# Patient Record
Sex: FEMALE | Race: WHITE | Employment: FULL TIME | URBAN - METROPOLITAN AREA
[De-identification: names, ages, dates, MRNs, and addresses within clinical notes are randomized per-mention and may not be internally consistent; named-entity substitution may affect disease eponyms.]

---

## 2017-01-10 ENCOUNTER — ALLSCRIPTS OFFICE VISIT (OUTPATIENT)
Dept: OTHER | Facility: OTHER | Age: 52
End: 2017-01-10

## 2017-01-28 ENCOUNTER — LAB CONVERSION - ENCOUNTER (OUTPATIENT)
Dept: OTHER | Facility: OTHER | Age: 52
End: 2017-01-28

## 2017-01-28 LAB — TSH SERPL DL<=0.05 MIU/L-ACNC: 1.69 MIU/L

## 2017-02-07 ENCOUNTER — ALLSCRIPTS OFFICE VISIT (OUTPATIENT)
Dept: OTHER | Facility: OTHER | Age: 52
End: 2017-02-07

## 2017-03-17 ENCOUNTER — ANESTHESIA (OUTPATIENT)
Dept: GASTROENTEROLOGY | Facility: HOSPITAL | Age: 52
End: 2017-03-17
Payer: COMMERCIAL

## 2017-03-17 ENCOUNTER — ANESTHESIA EVENT (OUTPATIENT)
Dept: GASTROENTEROLOGY | Facility: HOSPITAL | Age: 52
End: 2017-03-17
Payer: COMMERCIAL

## 2017-03-17 ENCOUNTER — HOSPITAL ENCOUNTER (OUTPATIENT)
Facility: HOSPITAL | Age: 52
Setting detail: OUTPATIENT SURGERY
Discharge: HOME | End: 2017-03-17
Attending: COLON & RECTAL SURGERY | Admitting: COLON & RECTAL SURGERY
Payer: COMMERCIAL

## 2017-03-17 VITALS
SYSTOLIC BLOOD PRESSURE: 116 MMHG | WEIGHT: 203 LBS | RESPIRATION RATE: 16 BRPM | HEIGHT: 64 IN | OXYGEN SATURATION: 97 % | BODY MASS INDEX: 34.66 KG/M2 | HEART RATE: 90 BPM | DIASTOLIC BLOOD PRESSURE: 79 MMHG | TEMPERATURE: 98.7 F

## 2017-03-17 RX ORDER — ROSUVASTATIN CALCIUM 10 MG/1
10 TABLET, COATED ORAL DAILY
COMMUNITY
End: 2017-09-17

## 2017-03-17 RX ORDER — TRIAMCINOLONE ACETONIDE 55 UG/1
SPRAY, METERED NASAL
COMMUNITY
End: 2018-02-14

## 2017-03-17 RX ORDER — GLYCOPYRROLATE 0.2 MG/ML
INJECTION INTRAMUSCULAR; INTRAVENOUS AS NEEDED
Status: DISCONTINUED | OUTPATIENT
Start: 2017-03-17 | End: 2017-03-17 | Stop reason: SURG

## 2017-03-17 RX ORDER — PROPOFOL 10 MG/ML
INJECTION, EMULSION INTRAVENOUS AS NEEDED
Status: DISCONTINUED | OUTPATIENT
Start: 2017-03-17 | End: 2017-03-17 | Stop reason: SURG

## 2017-03-17 RX ORDER — SODIUM CHLORIDE 9 MG/ML
125 INJECTION, SOLUTION INTRAVENOUS CONTINUOUS
Status: DISCONTINUED | OUTPATIENT
Start: 2017-03-17 | End: 2017-03-17 | Stop reason: HOSPADM

## 2017-03-17 RX ORDER — PROPOFOL 10 MG/ML
INJECTION, EMULSION INTRAVENOUS CONTINUOUS PRN
Status: DISCONTINUED | OUTPATIENT
Start: 2017-03-17 | End: 2017-03-17 | Stop reason: SURG

## 2017-03-17 RX ORDER — MONTELUKAST SODIUM 10 MG/1
10 TABLET ORAL
COMMUNITY
End: 2017-09-17

## 2017-03-17 RX ADMIN — SODIUM CHLORIDE: 0.9 INJECTION, SOLUTION INTRAVENOUS at 13:45

## 2017-03-17 RX ADMIN — PROPOFOL 60 MG: 10 INJECTION, EMULSION INTRAVENOUS at 13:50

## 2017-03-17 RX ADMIN — PROPOFOL 60 MG: 10 INJECTION, EMULSION INTRAVENOUS at 13:48

## 2017-03-17 RX ADMIN — GLYCOPYRROLATE 0.4 MG: 0.2 INJECTION INTRAMUSCULAR; INTRAVENOUS at 13:54

## 2017-03-17 RX ADMIN — PROPOFOL 100 MCG/KG/MIN: 10 INJECTION, EMULSION INTRAVENOUS at 13:48

## 2017-04-22 DIAGNOSIS — E66.9 OBESITY: ICD-10-CM

## 2017-04-22 DIAGNOSIS — E78.5 HYPERLIPIDEMIA: ICD-10-CM

## 2017-04-22 DIAGNOSIS — R73.01 IMPAIRED FASTING GLUCOSE: ICD-10-CM

## 2017-09-17 ENCOUNTER — HOSPITAL ENCOUNTER (EMERGENCY)
Facility: HOSPITAL | Age: 52
Discharge: HOME | End: 2017-09-17
Attending: EMERGENCY MEDICINE | Admitting: EMERGENCY MEDICINE
Payer: COMMERCIAL

## 2017-09-17 ENCOUNTER — APPOINTMENT (EMERGENCY)
Dept: RADIOLOGY | Facility: HOSPITAL | Age: 52
End: 2017-09-17
Payer: COMMERCIAL

## 2017-09-17 VITALS
RESPIRATION RATE: 18 BRPM | HEIGHT: 64 IN | DIASTOLIC BLOOD PRESSURE: 67 MMHG | HEART RATE: 77 BPM | BODY MASS INDEX: 35.17 KG/M2 | SYSTOLIC BLOOD PRESSURE: 128 MMHG | TEMPERATURE: 97.7 F | WEIGHT: 206 LBS | OXYGEN SATURATION: 100 %

## 2017-09-17 DIAGNOSIS — R09.89 JUGULAR VENOUS DISTENSION: Primary | ICD-10-CM

## 2017-09-17 LAB
ALBUMIN SERPL BCP-MCNC: 3.8 G/DL (ref 3.5–5)
ALP SERPL-CCNC: 130 U/L (ref 46–116)
ALT SERPL W P-5'-P-CCNC: 40 U/L (ref 12–78)
ANION GAP SERPL CALCULATED.3IONS-SCNC: 7 MMOL/L (ref 4–13)
AST SERPL W P-5'-P-CCNC: 24 U/L (ref 5–45)
BASOPHILS # BLD AUTO: 0.03 THOUSANDS/ΜL (ref 0–0.1)
BASOPHILS NFR BLD AUTO: 0 % (ref 0–1)
BILIRUB SERPL-MCNC: 0.6 MG/DL (ref 0.2–1)
BUN SERPL-MCNC: 11 MG/DL (ref 5–25)
CALCIUM SERPL-MCNC: 9 MG/DL (ref 8.3–10.1)
CHLORIDE SERPL-SCNC: 104 MMOL/L (ref 100–108)
CLARITY, POC: CLEAR
CO2 SERPL-SCNC: 30 MMOL/L (ref 21–32)
COLOR, POC: YELLOW
CREAT SERPL-MCNC: 0.78 MG/DL (ref 0.6–1.3)
EOSINOPHIL # BLD AUTO: 0.16 THOUSAND/ΜL (ref 0–0.61)
EOSINOPHIL NFR BLD AUTO: 2 % (ref 0–6)
ERYTHROCYTE [DISTWIDTH] IN BLOOD BY AUTOMATED COUNT: 14 % (ref 11.6–15.1)
EXT BILIRUBIN, UA: NEGATIVE
EXT BLOOD URINE: NEGATIVE
EXT GLUCOSE, UA: NEGATIVE
EXT KETONES: NEGATIVE
EXT NITRITE, UA: NEGATIVE
EXT PH, UA: 6.5
EXT PROTEIN, UA: NEGATIVE
EXT SPECIFIC GRAVITY, UA: 1.02
EXT UROBILINOGEN: NEGATIVE
GFR SERPL CREATININE-BSD FRML MDRD: 87 ML/MIN/1.73SQ M
GLUCOSE SERPL-MCNC: 123 MG/DL (ref 65–140)
HCT VFR BLD AUTO: 45.6 % (ref 34.8–46.1)
HGB BLD-MCNC: 15.2 G/DL (ref 11.5–15.4)
LIPASE SERPL-CCNC: 116 U/L (ref 73–393)
LYMPHOCYTES # BLD AUTO: 1.63 THOUSANDS/ΜL (ref 0.6–4.47)
LYMPHOCYTES NFR BLD AUTO: 18 % (ref 14–44)
MCH RBC QN AUTO: 30.5 PG (ref 26.8–34.3)
MCHC RBC AUTO-ENTMCNC: 33.3 G/DL (ref 31.4–37.4)
MCV RBC AUTO: 92 FL (ref 82–98)
MONOCYTES # BLD AUTO: 0.58 THOUSAND/ΜL (ref 0.17–1.22)
MONOCYTES NFR BLD AUTO: 7 % (ref 4–12)
NEUTROPHILS # BLD AUTO: 6.51 THOUSANDS/ΜL (ref 1.85–7.62)
NEUTS SEG NFR BLD AUTO: 73 % (ref 43–75)
PLATELET # BLD AUTO: 288 THOUSANDS/UL (ref 149–390)
PMV BLD AUTO: 10.7 FL (ref 8.9–12.7)
POTASSIUM SERPL-SCNC: 3.5 MMOL/L (ref 3.5–5.3)
PROT SERPL-MCNC: 8.2 G/DL (ref 6.4–8.2)
RBC # BLD AUTO: 4.98 MILLION/UL (ref 3.81–5.12)
SODIUM SERPL-SCNC: 141 MMOL/L (ref 136–145)
WBC # BLD AUTO: 8.91 THOUSAND/UL (ref 4.31–10.16)
WBC # BLD EST: NEGATIVE 10*3/UL

## 2017-09-17 PROCEDURE — 81002 URINALYSIS NONAUTO W/O SCOPE: CPT | Performed by: PHYSICIAN ASSISTANT

## 2017-09-17 PROCEDURE — 71020 HB CHEST X-RAY 2VW FRONTAL&LATL: CPT

## 2017-09-17 PROCEDURE — 85025 COMPLETE CBC W/AUTO DIFF WBC: CPT | Performed by: PHYSICIAN ASSISTANT

## 2017-09-17 PROCEDURE — 36415 COLL VENOUS BLD VENIPUNCTURE: CPT | Performed by: PHYSICIAN ASSISTANT

## 2017-09-17 PROCEDURE — 96361 HYDRATE IV INFUSION ADD-ON: CPT

## 2017-09-17 PROCEDURE — 99284 EMERGENCY DEPT VISIT MOD MDM: CPT

## 2017-09-17 PROCEDURE — 96360 HYDRATION IV INFUSION INIT: CPT

## 2017-09-17 PROCEDURE — 83690 ASSAY OF LIPASE: CPT | Performed by: PHYSICIAN ASSISTANT

## 2017-09-17 PROCEDURE — 80053 COMPREHEN METABOLIC PANEL: CPT | Performed by: PHYSICIAN ASSISTANT

## 2017-09-17 PROCEDURE — 70360 X-RAY EXAM OF NECK: CPT

## 2017-09-17 RX ADMIN — SODIUM CHLORIDE 1000 ML: 0.9 INJECTION, SOLUTION INTRAVENOUS at 15:31

## 2017-09-19 ENCOUNTER — ALLSCRIPTS OFFICE VISIT (OUTPATIENT)
Dept: OTHER | Facility: OTHER | Age: 52
End: 2017-09-19

## 2017-09-19 DIAGNOSIS — R22.2 LOCALIZED SWELLING, MASS AND LUMP, TRUNK: ICD-10-CM

## 2017-09-22 ENCOUNTER — TRANSCRIBE ORDERS (OUTPATIENT)
Dept: ADMINISTRATIVE | Facility: HOSPITAL | Age: 52
End: 2017-09-22

## 2017-09-22 DIAGNOSIS — R22.2 MASS IN CHEST: Primary | ICD-10-CM

## 2017-09-22 DIAGNOSIS — Z12.31 ENCOUNTER FOR SCREENING MAMMOGRAM FOR MALIGNANT NEOPLASM OF BREAST: Primary | ICD-10-CM

## 2017-09-28 ENCOUNTER — HOSPITAL ENCOUNTER (OUTPATIENT)
Dept: RADIOLOGY | Facility: MEDICAL CENTER | Age: 52
Discharge: HOME | End: 2017-09-28
Payer: COMMERCIAL

## 2017-09-28 DIAGNOSIS — R22.2 MASS IN CHEST: ICD-10-CM

## 2017-09-28 PROCEDURE — 70491 CT SOFT TISSUE NECK W/DYE: CPT

## 2017-09-28 RX ADMIN — IOHEXOL 85 ML: 350 INJECTION, SOLUTION INTRAVENOUS at 08:51

## 2017-09-29 ENCOUNTER — GENERIC CONVERSION - ENCOUNTER (OUTPATIENT)
Dept: OTHER | Facility: OTHER | Age: 52
End: 2017-09-29

## 2017-09-29 DIAGNOSIS — E04.1 NONTOXIC SINGLE THYROID NODULE: ICD-10-CM

## 2017-10-12 ENCOUNTER — HOSPITAL ENCOUNTER (OUTPATIENT)
Dept: RADIOLOGY | Facility: MEDICAL CENTER | Age: 52
Discharge: HOME | End: 2017-10-12
Payer: COMMERCIAL

## 2017-10-12 DIAGNOSIS — E04.1 NONTOXIC SINGLE THYROID NODULE: ICD-10-CM

## 2017-10-12 DIAGNOSIS — Z12.31 ENCOUNTER FOR SCREENING MAMMOGRAM FOR MALIGNANT NEOPLASM OF BREAST: ICD-10-CM

## 2017-10-12 PROCEDURE — G0202 SCR MAMMO BI INCL CAD: HCPCS

## 2017-10-12 PROCEDURE — 76536 US EXAM OF HEAD AND NECK: CPT

## 2017-10-18 ENCOUNTER — GENERIC CONVERSION - ENCOUNTER (OUTPATIENT)
Dept: OTHER | Facility: OTHER | Age: 52
End: 2017-10-18

## 2017-10-24 ENCOUNTER — HOSPITAL ENCOUNTER (OUTPATIENT)
Dept: RADIOLOGY | Facility: HOSPITAL | Age: 52
Discharge: HOME | End: 2017-10-24
Payer: COMMERCIAL

## 2017-10-24 DIAGNOSIS — E04.1 NONTOXIC SINGLE THYROID NODULE: ICD-10-CM

## 2017-10-24 PROCEDURE — 76942 ECHO GUIDE FOR BIOPSY: CPT

## 2017-10-24 PROCEDURE — 10022 HB FNA W/IMAGE: CPT

## 2017-10-24 PROCEDURE — 88172 CYTP DX EVAL FNA 1ST EA SITE: CPT | Performed by: PHYSICIAN ASSISTANT

## 2017-10-24 PROCEDURE — 88173 CYTOPATH EVAL FNA REPORT: CPT | Performed by: PHYSICIAN ASSISTANT

## 2017-10-24 RX ORDER — LIDOCAINE HYDROCHLORIDE 10 MG/ML
5 INJECTION, SOLUTION EPIDURAL; INFILTRATION; INTRACAUDAL; PERINEURAL ONCE
Status: COMPLETED | OUTPATIENT
Start: 2017-10-24 | End: 2017-10-24

## 2017-10-24 RX ADMIN — LIDOCAINE HYDROCHLORIDE 5 ML: 10 INJECTION, SOLUTION EPIDURAL; INFILTRATION; INTRACAUDAL; PERINEURAL at 09:35

## 2017-10-31 ENCOUNTER — LAB CONVERSION - ENCOUNTER (OUTPATIENT)
Dept: OTHER | Facility: OTHER | Age: 52
End: 2017-10-31

## 2017-10-31 LAB
A/G RATIO (HISTORICAL): 1.3 (CALC) (ref 1–2.5)
ALBUMIN SERPL BCP-MCNC: 4 G/DL (ref 3.6–5.1)
ALP SERPL-CCNC: 103 U/L (ref 33–130)
ALT SERPL W P-5'-P-CCNC: 19 U/L (ref 6–29)
AST SERPL W P-5'-P-CCNC: 19 U/L (ref 10–35)
BILIRUB SERPL-MCNC: 0.6 MG/DL (ref 0.2–1.2)
BUN SERPL-MCNC: 10 MG/DL (ref 7–25)
BUN/CREA RATIO (HISTORICAL): ABNORMAL (CALC) (ref 6–22)
CALCIUM SERPL-MCNC: 9.3 MG/DL (ref 8.6–10.4)
CHLORIDE SERPL-SCNC: 103 MMOL/L (ref 98–110)
CHOLEST SERPL-MCNC: 275 MG/DL
CHOLEST/HDLC SERPL: 6 (CALC)
CO2 SERPL-SCNC: 28 MMOL/L (ref 20–31)
CREAT SERPL-MCNC: 0.76 MG/DL (ref 0.5–1.05)
EGFR AFRICAN AMERICAN (HISTORICAL): 104 ML/MIN/1.73M2
EGFR-AMERICAN CALC (HISTORICAL): 90 ML/MIN/1.73M2
GAMMA GLOBULIN (HISTORICAL): 3.1 G/DL (CALC) (ref 1.9–3.7)
GLUCOSE (HISTORICAL): 124 MG/DL (ref 65–99)
HBA1C MFR BLD HPLC: 6.2 % OF TOTAL HGB
HDLC SERPL-MCNC: 46 MG/DL
LDL CHOLESTEROL (HISTORICAL): 194 MG/DL (CALC)
NON-HDL-CHOL (CHOL-HDL) (HISTORICAL): 229 MG/DL (CALC)
POTASSIUM SERPL-SCNC: 4.4 MMOL/L (ref 3.5–5.3)
SODIUM SERPL-SCNC: 138 MMOL/L (ref 135–146)
T3FREE SERPL-MCNC: 3.3 PG/ML (ref 2.3–4.2)
T4 FREE SERPL-MCNC: 0.9 NG/DL (ref 0.8–1.8)
TOTAL PROTEIN (HISTORICAL): 7.1 G/DL (ref 6.1–8.1)
TRIGL SERPL-MCNC: 173 MG/DL
TSH SERPL DL<=0.05 MIU/L-ACNC: 1.22 MIU/L

## 2017-11-01 ENCOUNTER — GENERIC CONVERSION - ENCOUNTER (OUTPATIENT)
Dept: OTHER | Facility: OTHER | Age: 52
End: 2017-11-01

## 2017-11-27 ENCOUNTER — ALLSCRIPTS OFFICE VISIT (OUTPATIENT)
Dept: OTHER | Facility: OTHER | Age: 52
End: 2017-11-27

## 2017-11-30 NOTE — PROGRESS NOTES
Assessment    1  Acute gouty arthropathy (274 01) (M10 9)    Plan  Acute gouty arthropathy    · Indomethacin 50 MG Oral Capsule; TAKE 1 CAPSULE 3 TIMES DAILY ASNEEDED  Elevated fasting blood sugar, Hyperlipidemia, Obesity (BMI 30 0-34  9)    · Rosuvastatin Calcium 10 MG Oral Tablet; TAKE 1 TABLET AT BEDTIME    Discussion/Summary    Pt has acute gou left foot  low purine diet  increased fluids  indocin ordred  f/u if perists or worsens  Chief Complaint  pain in big toe on left foot, unable to bend      History of Present Illness  HPI: pt presetsn with pain redness and swelling left first toe pt states this started last week ocver Thank[facility]  pt has been taking ibuprofen with relief  Review of Systems   Constitutional: no fever  Musculoskeletal: arthralgias,-- joint swelling-- and-- joint stiffness  Active Problems    1  Colon cancer screening (V76 51) (Z12 11)   2  Common migraine without aura (346 10) (G43 009)   3  Elevated fasting blood sugar (790 21) (R73 01)   4  Encounter for screening for lipid disorder (V77 91) (Z13 220)   5  Encounter for screening mammogram for malignant neoplasm of breast (V76 12) (Z12 31)   6  Fracture of 5th metatarsal (825 25) (S92 353A)   7  Hyperlipidemia (272 4) (E78 5)   8  Lipoma (214 9) (D17 9)   9  Obesity (BMI 30 0-34 9) (278 00) (E66 9)   10  Pain of right upper extremity (729 5) (M79 601)   11  Rhinitis (472 0) (J31 0)   12  Screening for diabetes mellitus (V77 1) (Z13 1)   13  Screening for thyroid disorder (V77 0) (Z13 29)   14  Shoulder impingement, right (726 2) (M75 41)   15  Thyroid nodule (241 0) (E04 1)   16  Weight gain (783 1) (R63 5)    Past Medical History  1  Allergic rhinitis (477 9) (J30 9)   2  History of H/O: hysterectomy (V88 01) (Z90 710)    Family History  Mother    1  Family history of cardiac disorder (V17 49) (Z82 49)   2  Family history of cerebrovascular accident (V17 1) (Z82 3)   3   Family history of diabetes mellitus (V18 0) (Z83 3)  Sister    4  Family history of diabetes mellitus (V18 0) (Z83 3)   5  Family history of hypertension (V17 49) (Z82 49)  Brother    10  Family history of hypertension (V17 49) (Z82 49)    Social History   · Being A Social Drinker   · Never A Smoker   · Uses Safety Equipment - Seatbelts    Surgical History    1  History of Hysterectomy    Current Meds   1  Montelukast Sodium 10 MG Oral Tablet; Take 1 tablet by mouth at bedtime; Therapy: 37ZRZ6134 to (Evaluate:41Bra4296)  Requested for: 74SFE6013; Last Rx:13Vfq8510 Ordered   2  Rosuvastatin Calcium 10 MG Oral Tablet; TAKE 1 TABLET AT BEDTIME; Therapy: 33DIF4435 to (Last Rx:01Nov2017)  Requested for: 66ELI7054 Ordered   3  SUMAtriptan Succinate 100 MG Oral Tablet; TAKE 1 TABLET BY MOUTH AT ONSET OF MIGRAINE AND MAY REPEAT IN 2 HOURSIF NEEDED; Therapy: 22Nny2801 to (Evaluate:91Jlv5804)  Requested for: 64Hut4444; Last Rx:99Iai3050 Ordered   4  Triamcinolone Acetonide 55 MCG/ACT Nasal Aerosol; One spray each nostril once a day; Therapy: 37GZV9342 to (Evaluate:22Pun9227)  Requested for: 77Rib3890; Last Rx:41Ojl7903 Ordered    Allergies    1  No Known Drug Allergies    Vitals   ** Printed in Appendix #1 below  Physical Exam   Constitutional  General appearance: No acute distress, well appearing and well nourished  appears healthy-- and-- obese  Eyes  Conjunctiva and lids: No swelling, erythema or discharge  Pulmonary  Respiratory effort: No increased work of breathing or signs of respiratory distress  Musculoskeletal  Joints, bones, and muscles: Abnormal  -- pt has mild swelling left firt metacarpal joint  no real eythema  pedal pulsse intact  Skin  Skin and subcutaneous tissue: Normal without rashes or lesions         Signatures   Electronically signed by : [facility]; Nov 27 2017  4:28PM EST                       (Author)    Electronically signed by : CANDACE Prater ; Nov 29 2017  8:31AM EST                       (Author)    Appendix #1 Patient: Marcie Villanueva ; : 1965; MRN: [id number]   Recorded: 01ASU0846 04:11PM   Temperature 97 4 F, Tympanic    Heart Rate 81, L Brachial Artery    Pulse Quality Normal, L Brachial Artery    Respiration Quality Normal    Respiration 18    Systolic 798, RUE, Sitting    Diastolic 80, RUE, Sitting    Height 5 ft 3 in    Patient Refused Weight Yes Yes   O2 Saturation 98

## 2018-01-14 VITALS
SYSTOLIC BLOOD PRESSURE: 132 MMHG | HEIGHT: 63 IN | DIASTOLIC BLOOD PRESSURE: 84 MMHG | HEART RATE: 62 BPM | RESPIRATION RATE: 18 BRPM | TEMPERATURE: 98.1 F

## 2018-01-14 VITALS
HEART RATE: 78 BPM | BODY MASS INDEX: 37.85 KG/M2 | OXYGEN SATURATION: 99 % | HEIGHT: 63 IN | WEIGHT: 213.6 LBS | TEMPERATURE: 98 F | DIASTOLIC BLOOD PRESSURE: 82 MMHG | SYSTOLIC BLOOD PRESSURE: 120 MMHG | RESPIRATION RATE: 18 BRPM

## 2018-01-14 VITALS
DIASTOLIC BLOOD PRESSURE: 74 MMHG | BODY MASS INDEX: 37.55 KG/M2 | WEIGHT: 212 LBS | RESPIRATION RATE: 18 BRPM | HEART RATE: 78 BPM | SYSTOLIC BLOOD PRESSURE: 118 MMHG

## 2018-01-14 VITALS
RESPIRATION RATE: 18 BRPM | HEIGHT: 63 IN | DIASTOLIC BLOOD PRESSURE: 80 MMHG | TEMPERATURE: 97.4 F | OXYGEN SATURATION: 98 % | SYSTOLIC BLOOD PRESSURE: 122 MMHG | HEART RATE: 81 BPM

## 2018-01-15 NOTE — RESULT NOTES
Verified Results  * [facility] HUMERUS RIGHT 26Apr2016 04:33PM Pam Holloway   TW Order Number: [id number]     Test Name Result Flag Reference   XR HUMERUS RIGHT (Report)     RIGHT HUMERUS     INDICATION: Right humerus pain  COMPARISON: None     VIEWS: 2; 2 images     FINDINGS:     There is no acute fracture or dislocation  No degenerative changes  No lytic or blastic lesions are seen  Soft tissues are unremarkable  IMPRESSION:     No acute osseous abnormality  Workstation performed: [id number]     Signed by:   Jasmin Bailey MD   4/26/16     * XR SHOULDER 2+ VIEW RIGHT 26Apr2016 04:33PM [facility] Order Number: [id number]     Test Name Result Flag Reference   XR SHOULDER 2+ VW RIGHT (Report)     RIGHT SHOULDER     INDICATION: Right shoulder pain  COMPARISON: None     VIEWS: 3; 3 images     FINDINGS:     There is no acute fracture or dislocation  No degenerative changes  No lytic or blastic lesions are seen  Soft tissues are unremarkable  IMPRESSION:     No acute osseous abnormality  Workstation performed: [id number]     Signed by:    Sanju Wang MD   4/26/16       Plan  Pain of right upper extremity    · *1 - [facility] Referral  Consult  Status: Active  Requested  for: 27Apr2016  Care Summary provided  : Yes

## 2018-01-15 NOTE — MISCELLANEOUS
Message   Recorded as Task   Date: 2017 01:56 PM, Created By: Sylvester Lacy   Task Name: Call Back   Assigned To: Sylvester Lacy   Regarding Patient: Mio Lawton, Status: In Progress   Scot Turner - 28 Sep 2017 1:56 PM     TASK CREATED  call   pt   wiht   ct  scan   neck  left  neck  lipoma  needs  thyroid  u/s  for   nodules  seen  on ct scan   Sylvester Lacy - 29 Sep 2017 12:56 PM     TASK EDITED  left  message  Sylvester Lacy - 29 Sep 2017 12:56 PM     TASK IN PROGRESS   spoke with pt  left supraclavicular raegion mass is lipoma  observe  inicdental finding of possible right thyroid nodules  pt needs thyroid U/s   check thyroid function panael  Plan  Thyroid nodule    · (1) FREE T3; Status:Active; Requested [id number];    · (1) T4, FREE; Status:Active; Requested [id number];    · (1) TSH; Status:Active;  Requested [id number];    · US THYROID; Status:Hold For - Scheduling; Requested [id number];     Signatures   Electronically signed by : Dea Florian, [facility]; Sep 29 2017  1:28PM EST                       (Author)

## 2018-01-17 NOTE — MISCELLANEOUS
Message   Recorded as Task   Date: 10/17/2017 12:30 PM, Created By: Roman Waggoner   Task Name: Call Back   Assigned To: Roman Waggoner   Regarding Patient: Deanna Lopez, Status: In Progress   Brooklynnamy Savagelong - 17 Oct 2017 12:30 PM     TASK CREATED  call  pt    thyroid  u/s  shows single thyroid nodule  which  needs   u/  guided  biopsy  Roman Waggoner - 45 Oct 2017 12:42 PM     TASK EDITED  left  message   Roman Waggoner - 18 Oct 2017 12:42 PM     TASK IN PROGRESS   spoke with pt  pt has thyroid nodule right lobe which needs biopsy   pt to aslo complete her lab work      Plan  Thyroid nodule    · [address]; Status:Hold For - Scheduling; Requested  [id number];     Signatures   Electronically signed by : Yolis King, [facility]; Oct 18 2017  5:37PM EST                       (Author)

## 2018-01-17 NOTE — MISCELLANEOUS
Message   Recorded as Task   Date: 11/01/2017 12:41 PM, Created By: Aleida Lawrence   Task Name: Call Back   Assigned To: Aleida Lawrence   Regarding Patient: Frantz Green, Status: Active   CommentValery Lena - 01 Nov 2017 12:41 PM     TASK CREATED  call  pt   wiht  labs  Spoke with patient's lab results reviewed with her  Patient has elevated total triglycerides and LDL  Patient has not been taking her rosuvastatin  Patient also has elevated fasting blood sugar and HGB A1c of 6 2  Low fat low carb diet discussed exercise and weight loss encouraged  We will repeat labs in 3 months lab slip mailed to patient        Signatures   Electronically signed by : Frantz Floyd, [facility]; Nov 1 2017  5:37PM EST                       (Author)

## 2018-01-21 DIAGNOSIS — R73.01 IMPAIRED FASTING GLUCOSE: ICD-10-CM

## 2018-01-21 DIAGNOSIS — E66.9 OBESITY: ICD-10-CM

## 2018-01-21 DIAGNOSIS — E78.5 HYPERLIPIDEMIA: ICD-10-CM

## 2018-02-14 ENCOUNTER — OFFICE VISIT (OUTPATIENT)
Dept: FAMILY MEDICINE CLINIC | Facility: CLINIC | Age: 53
End: 2018-02-14
Payer: COMMERCIAL

## 2018-02-14 VITALS
SYSTOLIC BLOOD PRESSURE: 118 MMHG | OXYGEN SATURATION: 98 % | HEART RATE: 103 BPM | WEIGHT: 208.6 LBS | BODY MASS INDEX: 35.61 KG/M2 | HEIGHT: 64 IN | TEMPERATURE: 100.9 F | DIASTOLIC BLOOD PRESSURE: 74 MMHG

## 2018-02-14 DIAGNOSIS — J11.1 INFLUENZA: Primary | ICD-10-CM

## 2018-02-14 PROBLEM — E66.9 OBESITY (BMI 30.0-34.9): Status: ACTIVE | Noted: 2017-02-07

## 2018-02-14 PROBLEM — R73.01 ELEVATED FASTING BLOOD SUGAR: Status: ACTIVE | Noted: 2017-02-07

## 2018-02-14 PROBLEM — D17.9 LIPOMA: Status: ACTIVE | Noted: 2017-09-29

## 2018-02-14 PROBLEM — E66.811 OBESITY (BMI 30.0-34.9): Status: ACTIVE | Noted: 2017-02-07

## 2018-02-14 PROBLEM — E78.5 HYPERLIPIDEMIA: Status: ACTIVE | Noted: 2017-02-07

## 2018-02-14 PROCEDURE — 99213 OFFICE O/P EST LOW 20 MIN: CPT | Performed by: PHYSICIAN ASSISTANT

## 2018-02-14 RX ORDER — OSELTAMIVIR PHOSPHATE 75 MG/1
75 CAPSULE ORAL EVERY 12 HOURS SCHEDULED
Qty: 10 CAPSULE | Refills: 0 | Status: SHIPPED | OUTPATIENT
Start: 2018-02-14 | End: 2018-02-19

## 2018-02-14 RX ORDER — ROSUVASTATIN CALCIUM 10 MG/1
10 TABLET, COATED ORAL
Refills: 1 | COMMUNITY
Start: 2018-01-04 | End: 2018-03-05

## 2018-02-14 RX ORDER — SUMATRIPTAN 100 MG/1
TABLET, FILM COATED ORAL
Refills: 3 | COMMUNITY
Start: 2018-01-30 | End: 2018-03-05 | Stop reason: SDUPTHER

## 2018-02-14 NOTE — PATIENT INSTRUCTIONS
Influenza   AMBULATORY CARE:   Influenza  (the flu) is an infection caused by the influenza virus  The flu is easily spread when an infected person coughs, sneezes, or has close contact with others  You may be able to spread the flu to others for 1 week or longer after signs or symptoms appear  Common signs and symptoms include the following:   · Fever and chills    · Headaches, body aches, and muscle or joint pain    · Cough, runny nose, and sore throat    · Loss of appetite, nausea, vomiting, or diarrhea    · Tiredness    · Trouble breathing  Call 911 for any of the following:   · You have trouble breathing, and your lips look purple or blue  · You have a seizure  Seek care immediately if:   · You are dizzy, or you are urinating less or not at all  · You have a headache with a stiff neck, and you feel tired or confused  · You have new pain or pressure in your chest     · Your symptoms, such as shortness of breath, vomiting, or diarrhea, get worse  · Your symptoms, such as fever and coughing, seem to get better, but then get worse  Contact your healthcare provider if:   · You have new muscle pain or weakness  · You have questions or concerns about your condition or care  Treatment for influenza  may include any of the following:  · Acetaminophen  decreases pain and fever  It is available without a doctor's order  Ask how much to take and how often to take it  Follow directions  Acetaminophen can cause liver damage if not taken correctly  · NSAIDs , such as ibuprofen, help decrease swelling, pain, and fever  This medicine is available with or without a doctor's order  NSAIDs can cause stomach bleeding or kidney problems in certain people  If you take blood thinner medicine, always ask your healthcare provider if NSAIDs are safe for you  Always read the medicine label and follow directions  · Antivirals  help fight a viral infection    Manage your symptoms:   · Rest  as much as you can to help you recover  · Drink liquids as directed  to help prevent dehydration  Ask how much liquid to drink each day and which liquids are best for you  Prevent the spread of the flu:   · Wash your hands often  Use soap and water  Wash your hands after you use the bathroom, change a child's diapers, or sneeze  Wash your hands before you prepare or eat food  Use gel hand cleanser when soap and water are not available  Do not touch your eyes, nose, or mouth unless you have washed your hands first            · Cover your mouth when you sneeze or cough  Cough into a tissue or the bend of your arm  · Clean shared items with a germ-killing   Clean table surfaces, doorknobs, and light switches  Do not share towels, silverware, and dishes with people who are sick  Wash bed sheets, towels, silverware, and dishes with soap and water  · Wear a mask  over your mouth and nose if you are sick or are near anyone who is sick  · Stay away from others  if you are sick  · Influenza vaccine  helps prevent influenza (flu)  Everyone older than 6 months should get a yearly influenza vaccine  Get the vaccine as soon as it is available, usually in September or October each year  Follow up with your healthcare provider as directed:  Write down your questions so you remember to ask them during your visits  © 2017 [employer]  Information is for End User's use only and may not be sold, redistributed or otherwise used for commercial purposes  All illustrations and images included in CareNotes® are the copyrighted property of [employer]  or [address]  The above information is an  only  It is not intended as medical advice for individual conditions or treatments  Talk to your doctor, nurse or pharmacist before following any medical regimen to see if it is safe and effective for you

## 2018-02-14 NOTE — PROGRESS NOTES
Assessment/Plan:         Diagnoses and all orders for this visit:    Influenza  Comments:    Patient has influenza Tamiflu all ordered patient to rest increase fluids patient may take over-the-counter fever reducer and cough medicine  Patient to repo  Orders:  -     oseltamivir (TAMIFLU) 75 mg capsule; Take 1 capsule (75 mg total) by mouth every 12 (twelve) hours for 5 days    Other orders  -     SUMAtriptan (IMITREX) 100 mg tablet; TAKE 1 TABLET BY MOUTH AT ONSET OF MIGRAINE AND MAY REPEAT IN 2 HOURS IF NEEDED  -     rosuvastatin (CRESTOR) 10 MG tablet; Take 10 mg by mouth daily at bedtime          Subjective:      Patient ID: Karla Cuevas is a 47 y o  female  Patient presents with flu-like symptoms which developed yesterday  Patient has fever chills and body aches sore throat and cough  Patient taking over-the-counter Coricidin  The following portions of the patient's history were reviewed and updated as appropriate:   She  has a past medical history of Hyperlipidemia; Migraine; and Seasonal allergies  She  does not have any pertinent problems on file  Current Outpatient Prescriptions on File Prior to Visit   Medication Sig    [DISCONTINUED] Triamcinolone Acetonide 55 MCG/ACT AERO into each nostril     No current facility-administered medications on file prior to visit  She has No Known Allergies       Review of Systems   Constitutional: Negative for activity change, chills, fatigue and fever  HENT: Negative for rhinorrhea and sore throat  Respiratory: Negative for cough  Gastrointestinal: Positive for nausea  Negative for diarrhea and vomiting  Musculoskeletal: Negative for arthralgias and myalgias  Objective:    Vitals:    02/14/18 1620   BP: 118/74   Pulse: 103   Temp: (!) 100 9 °F (38 3 °C)   SpO2: 98%        Physical Exam   Constitutional: She is oriented to person, place, and time  She appears well-developed and well-nourished     Acutely  ill   HENT:   Right Ear: External ear normal    Left Ear: External ear normal    Mouth/Throat: Oropharynx is clear and moist    Eyes: Conjunctivae are normal  Pupils are equal, round, and reactive to light  Neck: No thyromegaly present  Cardiovascular: Normal rate, regular rhythm and normal heart sounds  No murmur heard  Pulmonary/Chest: Effort normal and breath sounds normal    Lymphadenopathy:     She has no cervical adenopathy  Neurological: She is alert and oriented to person, place, and time  Skin: Skin is warm and dry

## 2018-02-14 NOTE — LETTER
February 14, 2018     Patient: Quillian Simmonds   YOB: 1965   Date of Visit: 2/14/2018       To Whom it May Concern:    Alexiswaleska Keating is under my professional care  She was seen in my office on 2/14/2018  She may return to work on 2/19/2018  If you have any questions or concerns, please don't hesitate to call           Sincerely,          SHELLEY WangC        CC: No Recipients

## 2018-03-05 DIAGNOSIS — G43.909 MIGRAINES: ICD-10-CM

## 2018-03-05 DIAGNOSIS — E78.5 HYPERLIPEMIA: Primary | ICD-10-CM

## 2018-03-05 RX ORDER — ROSUVASTATIN CALCIUM 10 MG/1
10 TABLET, COATED ORAL DAILY
Qty: 30 TABLET | Refills: 1 | Status: SHIPPED | OUTPATIENT
Start: 2018-03-05 | End: 2019-01-07

## 2018-03-05 RX ORDER — SUMATRIPTAN 100 MG/1
TABLET, FILM COATED ORAL
Qty: 9 TABLET | Refills: 3 | Status: SHIPPED | OUTPATIENT
Start: 2018-03-05 | End: 2018-07-09 | Stop reason: SDUPTHER

## 2018-05-30 PROBLEM — E04.1 THYROID NODULE: Status: ACTIVE | Noted: 2017-09-29

## 2018-05-31 ENCOUNTER — OFFICE VISIT (OUTPATIENT)
Dept: FAMILY MEDICINE CLINIC | Facility: CLINIC | Age: 53
End: 2018-05-31
Payer: COMMERCIAL

## 2018-05-31 VITALS
DIASTOLIC BLOOD PRESSURE: 82 MMHG | HEART RATE: 83 BPM | HEIGHT: 64 IN | BODY MASS INDEX: 35.24 KG/M2 | SYSTOLIC BLOOD PRESSURE: 132 MMHG | WEIGHT: 206.4 LBS | OXYGEN SATURATION: 97 % | TEMPERATURE: 97.7 F

## 2018-05-31 DIAGNOSIS — E78.5 HYPERLIPIDEMIA, UNSPECIFIED HYPERLIPIDEMIA TYPE: ICD-10-CM

## 2018-05-31 DIAGNOSIS — E04.1 THYROID NODULE: ICD-10-CM

## 2018-05-31 DIAGNOSIS — D17.0 LIPOMA OF NECK: Primary | ICD-10-CM

## 2018-05-31 DIAGNOSIS — E66.9 OBESITY (BMI 30.0-34.9): ICD-10-CM

## 2018-05-31 DIAGNOSIS — R73.01 ELEVATED FASTING BLOOD SUGAR: ICD-10-CM

## 2018-05-31 PROCEDURE — 99214 OFFICE O/P EST MOD 30 MIN: CPT | Performed by: PHYSICIAN ASSISTANT

## 2018-05-31 PROCEDURE — 3008F BODY MASS INDEX DOCD: CPT | Performed by: PHYSICIAN ASSISTANT

## 2018-05-31 NOTE — PROGRESS NOTES
Assessment/Plan:         Diagnoses and all orders for this visit:    Lipoma of neck  Comments:   discussed options of observing pursing referral to surgeon  Will observe at this time  Hyperlipidemia, unspecified hyperlipidemia type  Comments:    Patient is off her statin therapy will check lipids  Orders:  -     Comprehensive metabolic panel  -     Lipid panel    Elevated fasting blood sugar  Comments:   check A1c  Orders:  -     HEMOGLOBIN A1C W/ EAG ESTIMATION    Obesity (BMI 30 0-34  9)  Comments:    Weight loss and exercise encouraged  Thyroid nodule  Comments:    Check thyroid function studies  Will repeat thyroid ultrasound in the fall  Orders:  -     TSH, 3rd generation  -     T4          Subjective:      Patient ID: Stan Sierra is a 46 y o  female  Patient presents for a lump on the back of her neck  Patient states she was mowing the grass and her hair was up when she felt a lump there is no pain or redness in the area  Patient has known lipoma left side of her neck and  And mid anterior chest   Patient due for labs  Patient has been off her cholesterol medicine  Patient has history of a large thyroid nodule which was biopsied  Will repeat thyroid function studies  Thyroid ultrasound due in October        The following portions of the patient's history were reviewed and updated as appropriate:   She  has a past medical history of Hyperlipidemia; Migraine; and Seasonal allergies    She   Patient Active Problem List    Diagnosis Date Noted    Lipoma 09/29/2017    Thyroid nodule 09/29/2017    Elevated fasting blood sugar 02/07/2017    Hyperlipidemia 02/07/2017    Obesity (BMI 30 0-34 9) 02/07/2017    Common migraine without aura 08/03/2012     Current Outpatient Prescriptions   Medication Sig Dispense Refill    rosuvastatin (CRESTOR) 10 MG tablet Take 1 tablet (10 mg total) by mouth daily 30 tablet 1    SUMAtriptan (IMITREX) 100 mg tablet TAKE 1 TABLET BY MOUTH AT ONSET OF MIGRAINE AND MAY REPEAT IN 2 HOURS IF NEEDED 9 tablet 3     No current facility-administered medications for this visit  Current Outpatient Prescriptions on File Prior to Visit   Medication Sig    rosuvastatin (CRESTOR) 10 MG tablet Take 1 tablet (10 mg total) by mouth daily    SUMAtriptan (IMITREX) 100 mg tablet TAKE 1 TABLET BY MOUTH AT ONSET OF MIGRAINE AND MAY REPEAT IN 2 HOURS IF NEEDED     No current facility-administered medications on file prior to visit  She has No Known Allergies       Review of Systems   Constitutional: Negative for activity change and fever  Eyes: Negative for visual disturbance  Respiratory: Negative for cough and shortness of breath  Cardiovascular: Negative for chest pain and leg swelling  Musculoskeletal: Negative for neck pain  Skin: Negative for rash  Lump  Back of  neck   Neurological: Negative for dizziness, syncope and headaches  Objective:      /82 (BP Location: Right arm, Patient Position: Sitting, Cuff Size: Standard)   Pulse 83   Temp 97 7 °F (36 5 °C)   Ht 5' 4" (1 626 m)   Wt 93 6 kg (206 lb 6 4 oz)   SpO2 97%   BMI 35 43 kg/m²          Physical Exam   Constitutional: She is oriented to person, place, and time  She appears well-developed and well-nourished  HENT:   Right Ear: External ear normal    Left Ear: External ear normal    Eyes: Conjunctivae are normal  Pupils are equal, round, and reactive to light  Neck: Neck supple  Pulmonary/Chest: Effort normal    Musculoskeletal: She exhibits no edema  Lymphadenopathy:     She has no cervical adenopathy  Neurological: She is alert and oriented to person, place, and time  Skin: Skin is warm and dry  No rash noted  Patient has a 2 x 2 mass in the area of C7-T1  About 2 x 2 consistency of a pillow  Nontender and freely mobile in the skin  Not fluctuant not red or tender  The lump is consistent with a lipoma  Psychiatric: She has a normal mood and affect   Her behavior is normal  Judgment and thought content normal    Nursing note and vitals reviewed

## 2018-07-09 DIAGNOSIS — G43.909 MIGRAINES: ICD-10-CM

## 2018-07-09 RX ORDER — SUMATRIPTAN 100 MG/1
TABLET, FILM COATED ORAL
Qty: 9 TABLET | Refills: 3 | Status: SHIPPED | OUTPATIENT
Start: 2018-07-09 | End: 2019-01-02 | Stop reason: SDUPTHER

## 2019-01-02 ENCOUNTER — OFFICE VISIT (OUTPATIENT)
Dept: FAMILY MEDICINE CLINIC | Facility: CLINIC | Age: 54
End: 2019-01-02
Payer: COMMERCIAL

## 2019-01-02 VITALS
WEIGHT: 205.4 LBS | SYSTOLIC BLOOD PRESSURE: 128 MMHG | TEMPERATURE: 97.8 F | OXYGEN SATURATION: 98 % | BODY MASS INDEX: 35.07 KG/M2 | HEART RATE: 84 BPM | DIASTOLIC BLOOD PRESSURE: 82 MMHG | HEIGHT: 64 IN

## 2019-01-02 DIAGNOSIS — J01.00 ACUTE NON-RECURRENT MAXILLARY SINUSITIS: ICD-10-CM

## 2019-01-02 DIAGNOSIS — M79.645 BILATERAL THUMB PAIN: ICD-10-CM

## 2019-01-02 DIAGNOSIS — Z12.39 SCREENING FOR BREAST CANCER: ICD-10-CM

## 2019-01-02 DIAGNOSIS — M79.644 BILATERAL THUMB PAIN: ICD-10-CM

## 2019-01-02 DIAGNOSIS — G43.009 MIGRAINE WITHOUT AURA AND WITHOUT STATUS MIGRAINOSUS, NOT INTRACTABLE: ICD-10-CM

## 2019-01-02 DIAGNOSIS — E78.5 HYPERLIPIDEMIA, UNSPECIFIED HYPERLIPIDEMIA TYPE: ICD-10-CM

## 2019-01-02 DIAGNOSIS — E04.1 THYROID NODULE: Primary | ICD-10-CM

## 2019-01-02 PROCEDURE — 99214 OFFICE O/P EST MOD 30 MIN: CPT | Performed by: PHYSICIAN ASSISTANT

## 2019-01-02 RX ORDER — SUMATRIPTAN 100 MG/1
100 TABLET, FILM COATED ORAL ONCE AS NEEDED
Qty: 9 TABLET | Refills: 0 | Status: SHIPPED | OUTPATIENT
Start: 2019-01-02

## 2019-01-02 RX ORDER — AMOXICILLIN 500 MG/1
500 CAPSULE ORAL EVERY 8 HOURS SCHEDULED
Qty: 30 CAPSULE | Refills: 0 | Status: SHIPPED | OUTPATIENT
Start: 2019-01-02 | End: 2019-01-07

## 2019-01-02 NOTE — PROGRESS NOTES
Assessment/Plan:     Diagnoses and all orders for this visit:    Thyroid nodule  Comments:  Thyroid ultrasound ordered  Orders:  -     US thyroid; Future    Hyperlipidemia, unspecified hyperlipidemia type  Comments:  Continue statin therapy and proceed with labs    Screening for breast cancer  -     Mammo screening bilateral w cad; Future    Acute non-recurrent maxillary sinusitis  Comments:  P  O  Amoxicillin ordered  Patient may continue to use over-the-counter cough and cold preps as needed   Orders:  -     amoxicillin (AMOXIL) 500 mg capsule; Take 1 capsule (500 mg total) by mouth every 8 (eight) hours for 10 days    Migraine without aura and without status migrainosus, not intractable  -     SUMAtriptan (IMITREX) 100 mg tablet; Take 1 tablet (100 mg total) by mouth once as needed for migraine for up to 1 dose    Bilateral thumb pain  Comments:  Referred to orthopedic  Orders:  -     Ambulatory referral to Orthopedic Surgery; Future          Subjective:      Patient ID: Sejal Cabral is a 46 y o  female  Patient presents with upper respiratory symptoms for over 2 weeks  Patient states it started as a cold  Patient now has sinus pain and pressure and continual postnasal drip no fever some cough  Patient also has bilateral thumb pain her thumbs click when she bends them  Patient overdue for labs  Patient due for mammogram   Patient due for thyroid ultrasound follow-up thyroid nodule which was biopsied last year  The following portions of the patient's history were reviewed and updated as appropriate:   She  has a past medical history of Hyperlipidemia; Migraine; and Seasonal allergies    She   Patient Active Problem List    Diagnosis Date Noted    Screening for breast cancer 01/02/2019    Acute non-recurrent maxillary sinusitis 01/02/2019    Bilateral thumb pain 01/02/2019    Lipoma 09/29/2017    Thyroid nodule 09/29/2017    Elevated fasting blood sugar 02/07/2017    Hyperlipidemia 02/07/2017    Obesity (BMI 30 0-34 9) 02/07/2017    Common migraine without aura 08/03/2012     Current Outpatient Prescriptions   Medication Sig Dispense Refill    amoxicillin (AMOXIL) 500 mg capsule Take 1 capsule (500 mg total) by mouth every 8 (eight) hours for 10 days 30 capsule 0    rosuvastatin (CRESTOR) 10 MG tablet Take 1 tablet (10 mg total) by mouth daily 30 tablet 1    SUMAtriptan (IMITREX) 100 mg tablet Take 1 tablet (100 mg total) by mouth once as needed for migraine for up to 1 dose 9 tablet 0     No current facility-administered medications for this visit  Current Outpatient Prescriptions on File Prior to Visit   Medication Sig    rosuvastatin (CRESTOR) 10 MG tablet Take 1 tablet (10 mg total) by mouth daily    [DISCONTINUED] SUMAtriptan (IMITREX) 100 mg tablet TAKE 1 TABLET BY MOUTH AT ONSET OF MIGRAINE AND MAY REPEAT IN 2 HOURS IF NEEDED     No current facility-administered medications on file prior to visit  She has No Known Allergies       Review of Systems   Constitutional: Positive for chills  Negative for activity change, appetite change and fever  HENT: Positive for postnasal drip, rhinorrhea, sinus pain and sinus pressure  Negative for ear pain and sore throat  Respiratory: Positive for cough  Cardiovascular: Negative for chest pain  Gastrointestinal: Negative for diarrhea, nausea and vomiting  Musculoskeletal: Positive for arthralgias  Bilateral thumb pain and clicking   Neurological: Positive for headaches  Objective:        Physical Exam   Constitutional: She is oriented to person, place, and time  She appears well-developed and well-nourished  No distress  overweight  White   female   HENT:   Head: Normocephalic and atraumatic  Right Ear: Tympanic membrane, external ear and ear canal normal    Left Ear: Tympanic membrane, external ear and ear canal normal    Nose: Right sinus exhibits maxillary sinus tenderness   Right sinus exhibits no frontal sinus tenderness  Left sinus exhibits maxillary sinus tenderness  Left sinus exhibits no frontal sinus tenderness  Mouth/Throat: Posterior oropharyngeal erythema present  No oropharyngeal exudate  Eyes: Pupils are equal, round, and reactive to light  Conjunctivae are normal    Neck: Normal range of motion  Neck supple  Carotid bruit is not present  No thyromegaly present  Stable lipoma left anterior neck  Stable lipoma posterior neck  Thyroid nonpalpable but due for follow-up thyroid ultrasound  Cardiovascular: Normal rate and regular rhythm  Exam reveals no gallop and no friction rub  No murmur heard  Pulmonary/Chest: Effort normal and breath sounds normal  No respiratory distress  She has no wheezes  Musculoskeletal: Normal range of motion  She exhibits tenderness  She exhibits no edema  Patient has bilateral 1st finger DI p m  PIP joint tenderness  Left thumb DI P joint blocks  No erythema no swelling   Lymphadenopathy:     She has cervical adenopathy  Neurological: She is alert and oriented to person, place, and time  Skin: Skin is warm and dry  No rash noted  She is not diaphoretic  No erythema  Psychiatric: She has a normal mood and affect  Her behavior is normal  Judgment and thought content normal    Nursing note and vitals reviewed

## 2019-01-04 ENCOUNTER — HOSPITAL ENCOUNTER (OUTPATIENT)
Dept: RADIOLOGY | Facility: MEDICAL CENTER | Age: 54
Discharge: HOME | End: 2019-01-04
Payer: COMMERCIAL

## 2019-01-04 DIAGNOSIS — E04.1 THYROID NODULE: ICD-10-CM

## 2019-01-04 PROCEDURE — 76536 US EXAM OF HEAD AND NECK: CPT

## 2019-01-07 ENCOUNTER — OFFICE VISIT (OUTPATIENT)
Dept: OBGYN CLINIC | Facility: MEDICAL CENTER | Age: 54
End: 2019-01-07
Payer: COMMERCIAL

## 2019-01-07 VITALS
WEIGHT: 208.2 LBS | DIASTOLIC BLOOD PRESSURE: 84 MMHG | SYSTOLIC BLOOD PRESSURE: 127 MMHG | HEIGHT: 64 IN | BODY MASS INDEX: 35.55 KG/M2 | HEART RATE: 84 BPM

## 2019-01-07 DIAGNOSIS — M65.312 TRIGGER THUMB OF LEFT HAND: Primary | ICD-10-CM

## 2019-01-07 DIAGNOSIS — M65.311 TRIGGER THUMB OF RIGHT HAND: ICD-10-CM

## 2019-01-07 DIAGNOSIS — M79.645 BILATERAL THUMB PAIN: ICD-10-CM

## 2019-01-07 DIAGNOSIS — M79.644 BILATERAL THUMB PAIN: ICD-10-CM

## 2019-01-07 PROCEDURE — 99203 OFFICE O/P NEW LOW 30 MIN: CPT | Performed by: ORTHOPAEDIC SURGERY

## 2019-01-07 PROCEDURE — 20550 NJX 1 TENDON SHEATH/LIGAMENT: CPT | Performed by: ORTHOPAEDIC SURGERY

## 2019-01-07 RX ADMIN — TRIAMCINOLONE ACETONIDE 20 MG: 40 INJECTION, SUSPENSION INTRA-ARTICULAR; INTRAMUSCULAR at 15:38

## 2019-01-07 RX ADMIN — LIDOCAINE HYDROCHLORIDE 0.5 ML: 10 INJECTION, SOLUTION INFILTRATION; PERINEURAL at 15:37

## 2019-01-07 RX ADMIN — TRIAMCINOLONE ACETONIDE 20 MG: 40 INJECTION, SUSPENSION INTRA-ARTICULAR; INTRAMUSCULAR at 15:37

## 2019-01-07 RX ADMIN — LIDOCAINE HYDROCHLORIDE 0.5 ML: 10 INJECTION, SOLUTION INFILTRATION; PERINEURAL at 15:38

## 2019-01-08 RX ORDER — TRIAMCINOLONE ACETONIDE 40 MG/ML
20 INJECTION, SUSPENSION INTRA-ARTICULAR; INTRAMUSCULAR
Status: COMPLETED | OUTPATIENT
Start: 2019-01-07 | End: 2019-01-07

## 2019-01-08 RX ORDER — LIDOCAINE HYDROCHLORIDE 10 MG/ML
0.5 INJECTION, SOLUTION INFILTRATION; PERINEURAL
Status: COMPLETED | OUTPATIENT
Start: 2019-01-07 | End: 2019-01-07

## 2019-01-09 ENCOUNTER — TELEPHONE (OUTPATIENT)
Dept: FAMILY MEDICINE CLINIC | Facility: CLINIC | Age: 54
End: 2019-01-09

## 2019-01-09 NOTE — TELEPHONE ENCOUNTER
----- Message from Cheikh German PA-C sent at 1/7/2019 10:31 AM EST -----  Call  Pt    Her  Thyroid  U/s  Is  Stable  No  New   Nodules     Repeat in  1-2  years

## 2019-01-28 ENCOUNTER — OFFICE VISIT (OUTPATIENT)
Dept: OBGYN CLINIC | Facility: MEDICAL CENTER | Age: 54
End: 2019-01-28
Payer: COMMERCIAL

## 2019-01-28 VITALS
SYSTOLIC BLOOD PRESSURE: 125 MMHG | DIASTOLIC BLOOD PRESSURE: 86 MMHG | BODY MASS INDEX: 35.51 KG/M2 | HEIGHT: 64 IN | WEIGHT: 208 LBS | HEART RATE: 68 BPM

## 2019-01-28 DIAGNOSIS — M79.644 BILATERAL THUMB PAIN: Primary | ICD-10-CM

## 2019-01-28 DIAGNOSIS — M79.645 BILATERAL THUMB PAIN: Primary | ICD-10-CM

## 2019-01-28 PROCEDURE — 99213 OFFICE O/P EST LOW 20 MIN: CPT | Performed by: ORTHOPAEDIC SURGERY

## 2019-01-28 NOTE — PROGRESS NOTES
CHIEF COMPLAINT:  Chief Complaint   Patient presents with    Right Thumb - Follow-up    Left Thumb - Follow-up       SUBJECTIVE:  Mary Briseno is a 46y o  year old RHD female who presents to the office s/p bilateral trigger thumb injections administered 01/07/2019  Patient states that she no longer has locking or pain and only has occasional clicking of her bilateral thumbs  Patient states the left is more bothersome than the right  PAST MEDICAL HISTORY:  Past Medical History:   Diagnosis Date    Hyperlipidemia     Migraine     Seasonal allergies        PAST SURGICAL HISTORY:  Past Surgical History:   Procedure Laterality Date    HYSTERECTOMY      partial    NE COLONOSCOPY FLX DX W/COLLJ SPEC WHEN PFRMD N/A 3/17/2017    Procedure: COLONOSCOPY;  Surgeon: Alberto Short MD;  Location: [facility]; Service: Colorectal       FAMILY HISTORY:  Family History   Problem Relation Age of Onset    Other Mother         cardiac disorder    Stroke Mother         CVA    Diabetes Mother     Diabetes Sister     Hypertension Sister     Hypertension Brother        SOCIAL HISTORY:  Social History   Substance Use Topics    Smoking status: Never Smoker    Smokeless tobacco: Never Used    Alcohol use No      Comment: Social drinker per [employer]       MEDICATIONS:    Current Outpatient Prescriptions:     SUMAtriptan (IMITREX) 100 mg tablet, Take 1 tablet (100 mg total) by mouth once as needed for migraine for up to 1 dose, Disp: 9 tablet, Rfl: 0    ALLERGIES:  No Known Allergies    REVIEW OF SYSTEMS:  Review of Systems   Constitutional: Negative for chills, fever and unexpected weight change  HENT: Negative for hearing loss, nosebleeds and sore throat  Eyes: Negative for pain, redness and visual disturbance  Respiratory: Negative for cough, shortness of breath and wheezing  Cardiovascular: Negative for chest pain, palpitations and leg swelling     Gastrointestinal: Negative for abdominal pain, nausea and vomiting  Endocrine: Negative for polydipsia and polyuria  Genitourinary: Negative for dysuria and hematuria  Musculoskeletal: Negative for arthralgias, joint swelling and myalgias  Skin: Negative for rash and wound  Neurological: Negative for dizziness, numbness and headaches  Psychiatric/Behavioral: Negative for decreased concentration, dysphoric mood and suicidal ideas  The patient is not nervous/anxious  VITALS:  Vitals:    01/28/19 1500   BP: 125/86   Pulse: 68       LABS:  HgA1c:   Lab Results   Component Value Date    HGBA1C 6 2 (H) 10/30/2017     BMP:   Lab Results   Component Value Date    CALCIUM 9 3 10/30/2017     10/30/2017    K 4 4 10/30/2017    CO2 28 10/30/2017     10/30/2017    BUN 10 10/30/2017    CREATININE 0 76 10/30/2017       _____________________________________________________  PHYSICAL EXAMINATION:  General: well developed and well nourished, alert, oriented times 3 and appears comfortable  Psychiatric: Normal  HEENT: Trachea Midline, No torticollis  Pulmonary: No audible wheezing or respiratory distress   Skin: No masses, erthema, lacerations, fluctation, ulcerations  Neurovascular: Sensation Intact to the Median, Ulnar, Radial Nerve, Motor Intact to the Median, Ulnar, Radial Nerve and Pulses Intact    MUSCULOSKELETAL EXAMINATION:  Bilateral thumbs:  Positive palpable nodule over the A1 pulley  Negative tenderness to palpation over A1 pulley  Negative clicking  Negative catching        ___________________________________________________  STUDIES REVIEWED:  No studies reviewed         PROCEDURES PERFORMED:  Procedures  No Procedures performed today    _____________________________________________________  ASSESSMENT/PLAN:    S/p cortisone steroid injection administered 01/07/2019 for trigger thumb bilaterally with significantly improved symptoms  * patient was advised if she has return locking or pain or increased clicking that she feels is bothersome she may call the office for re-evaluation  * patient was advised that we will only administer 1 more cortisone steroid injection before we consider trigger finger release        Follow Up:  Return if symptoms worsen or fail to improve        To Do Next Visit:  Re-evaluation of current issue      Scribe Attestation    I,:   Porfirio Mejía am acting as a scribe while in the presence of the attending physician :        I,:   Jose Mckeon MD personally performed the services described in this documentation    as scribed in my presence :

## 2019-02-05 ENCOUNTER — HOSPITAL ENCOUNTER (OUTPATIENT)
Dept: RADIOLOGY | Facility: MEDICAL CENTER | Age: 54
Discharge: HOME | End: 2019-02-05
Payer: COMMERCIAL

## 2019-02-05 VITALS — BODY MASS INDEX: 35.51 KG/M2 | HEIGHT: 64 IN | WEIGHT: 208 LBS

## 2019-02-05 DIAGNOSIS — Z12.39 SCREENING FOR BREAST CANCER: ICD-10-CM

## 2019-02-05 PROCEDURE — 77067 SCR MAMMO BI INCL CAD: CPT

## 2019-05-06 ENCOUNTER — OFFICE VISIT (OUTPATIENT)
Dept: OBGYN CLINIC | Facility: MEDICAL CENTER | Age: 54
End: 2019-05-06
Payer: COMMERCIAL

## 2019-05-06 DIAGNOSIS — M65.312 TRIGGER THUMB OF LEFT HAND: Primary | ICD-10-CM

## 2019-05-06 PROCEDURE — 99213 OFFICE O/P EST LOW 20 MIN: CPT | Performed by: ORTHOPAEDIC SURGERY

## 2019-05-07 PROCEDURE — 20550 NJX 1 TENDON SHEATH/LIGAMENT: CPT | Performed by: ORTHOPAEDIC SURGERY

## 2019-05-07 RX ORDER — LIDOCAINE HYDROCHLORIDE 10 MG/ML
0.5 INJECTION, SOLUTION INFILTRATION; PERINEURAL
Status: COMPLETED | OUTPATIENT
Start: 2019-05-07 | End: 2019-05-07

## 2019-05-07 RX ORDER — TRIAMCINOLONE ACETONIDE 40 MG/ML
20 INJECTION, SUSPENSION INTRA-ARTICULAR; INTRAMUSCULAR
Status: COMPLETED | OUTPATIENT
Start: 2019-05-07 | End: 2019-05-07

## 2019-05-07 RX ADMIN — TRIAMCINOLONE ACETONIDE 20 MG: 40 INJECTION, SUSPENSION INTRA-ARTICULAR; INTRAMUSCULAR at 08:02

## 2019-05-07 RX ADMIN — LIDOCAINE HYDROCHLORIDE 0.5 ML: 10 INJECTION, SOLUTION INFILTRATION; PERINEURAL at 08:02

## 2019-12-09 ENCOUNTER — OFFICE VISIT (OUTPATIENT)
Dept: OBGYN CLINIC | Facility: MEDICAL CENTER | Age: 54
End: 2019-12-09
Payer: COMMERCIAL

## 2019-12-09 VITALS
WEIGHT: 208 LBS | HEIGHT: 64 IN | HEART RATE: 83 BPM | BODY MASS INDEX: 35.51 KG/M2 | DIASTOLIC BLOOD PRESSURE: 85 MMHG | SYSTOLIC BLOOD PRESSURE: 142 MMHG

## 2019-12-09 DIAGNOSIS — M65.311 TRIGGER THUMB OF RIGHT HAND: Primary | ICD-10-CM

## 2019-12-09 PROCEDURE — 99213 OFFICE O/P EST LOW 20 MIN: CPT | Performed by: ORTHOPAEDIC SURGERY

## 2019-12-09 PROCEDURE — 20550 NJX 1 TENDON SHEATH/LIGAMENT: CPT | Performed by: ORTHOPAEDIC SURGERY

## 2019-12-09 RX ORDER — LIDOCAINE HYDROCHLORIDE 10 MG/ML
1 INJECTION, SOLUTION INFILTRATION; PERINEURAL
Status: COMPLETED | OUTPATIENT
Start: 2019-12-09 | End: 2019-12-09

## 2019-12-09 RX ORDER — TRIAMCINOLONE ACETONIDE 40 MG/ML
20 INJECTION, SUSPENSION INTRA-ARTICULAR; INTRAMUSCULAR
Status: COMPLETED | OUTPATIENT
Start: 2019-12-09 | End: 2019-12-09

## 2019-12-09 RX ADMIN — LIDOCAINE HYDROCHLORIDE 1 ML: 10 INJECTION, SOLUTION INFILTRATION; PERINEURAL at 15:37

## 2019-12-09 RX ADMIN — TRIAMCINOLONE ACETONIDE 20 MG: 40 INJECTION, SUSPENSION INTRA-ARTICULAR; INTRAMUSCULAR at 15:37

## 2019-12-09 NOTE — PROGRESS NOTES
CHIEF COMPLAINT:  Chief Complaint   Patient presents with    Right Hand - Follow-up    Left Hand - Follow-up       SUBJECTIVE:  Alta Bowens is a 53y o  year old female in for f/u regarding bilateral trigger thumbs  Left thumb given CSI 5/6/19 which did help until recently, pain, triggering starting to return  She is having more pain in right thumb today, CSI in January  Pain is so bad she can carry or lift items due to pain  She is noting catching, clicking, pain extending thumb  Denies numbness or tingling  PAST MEDICAL HISTORY:  Past Medical History:   Diagnosis Date    Hyperlipidemia     Migraine     Seasonal allergies        PAST SURGICAL HISTORY:  Past Surgical History:   Procedure Laterality Date    HYSTERECTOMY      partial    NM COLONOSCOPY FLX DX W/COLLJ SPEC WHEN PFRMD N/A 3/17/2017    Procedure: COLONOSCOPY;  Surgeon: Omid Trevizo MD;  Location: [facility]; Service: Colorectal       FAMILY HISTORY:  Family History   Problem Relation Age of Onset    Other Mother         cardiac disorder    Stroke Mother         CVA    Diabetes Mother     Diabetes Sister     Hypertension Sister     Hypertension Brother        SOCIAL HISTORY:  Social History     Tobacco Use    Smoking status: Never Smoker    Smokeless tobacco: Never Used   Substance Use Topics    Alcohol use: No     Comment: Social drinker per [employer]    Drug use: No       MEDICATIONS:    Current Outpatient Medications:     SUMAtriptan (IMITREX) 100 mg tablet, Take 1 tablet (100 mg total) by mouth once as needed for migraine for up to 1 dose, Disp: 9 tablet, Rfl: 0    ALLERGIES:  No Known Allergies    REVIEW OF SYSTEMS:  Review of Systems   Constitutional: Negative for chills and fever  HENT: Negative for drooling and sneezing  Eyes: Negative for redness  Respiratory: Negative for cough and wheezing  Gastrointestinal: Negative for nausea and vomiting     Psychiatric/Behavioral: The patient is not nervous/anxious  VITALS:  Vitals:    12/09/19 1528   BP: 142/85   Pulse: 83       LABS:  HgA1c:   Lab Results   Component Value Date    HGBA1C 6 2 (H) 10/30/2017     BMP:   Lab Results   Component Value Date    CALCIUM 9 3 10/30/2017     10/30/2017    K 4 4 10/30/2017    CO2 28 10/30/2017     10/30/2017    BUN 10 10/30/2017    CREATININE 0 76 10/30/2017       _____________________________________________________  PHYSICAL EXAMINATION:  General: well developed and well nourished, alert, oriented times 3 and appears comfortable  Psychiatric: Normal  HEENT: Trachea Midline, No torticollis  Pulmonary: No audible wheezing or strider  Cardiovascular: No discernable arrhythmia   Skin: No masses, erythema, lacerations, fluctation, ulcerations  Neurovascular: Sensation Intact to the Median, Ulnar, Radial Nerve, Motor Intact to the Median, Ulnar, Radial Nerve and Pulses Intact    MUSCULOSKELETAL EXAMINATION:  left thumb:    Positive palpable nodule over the A1 pulley  mild tenderness to palpation over A1 pulley  Negative clicking  Negative catching  Right thumb positive palpable nodule over A-1 pulley, tenderness to palpation over A-1 pulley, positive clicking and catching      ___________________________________________________  STUDIES REVIEWED:  No studies reviewed         PROCEDURES PERFORMED:  Hand/upper extremity injection: R thumb A1  Date/Time: 12/9/2019 3:37 PM  Consent given by: patient  Site marked: site marked  Timeout: Immediately prior to procedure a time out was called to verify the correct patient, procedure, equipment, support staff and site/side marked as required   Supporting Documentation  Indications: pain   Procedure Details  Condition:trigger finger Location: thumb - R thumb A1   Preparation: Patient was prepped and draped in the usual sterile fashion  Needle size: 25 G  Ultrasound guidance: no  Approach: volar  Medications administered: 1 mL lidocaine 1 %; 20 mg triamcinolone acetonide 40 mg/mL    Patient tolerance: patient tolerated the procedure well with no immediate complications  Dressing:  Sterile dressing applied             _____________________________________________________  ASSESSMENT/PLAN:    Bilateral trigger thumbs, right symptomatic     Right trigger thumb CSI given today ice area over next few days  If injection offers no relief of symptoms then she will need trigger thumb release  She wants to hold on surgery currently, call if symptoms worsen to schedule trigger thumb release  Diagnoses and all orders for this visit:    Trigger thumb of right hand    Other orders  -     Hand/upper extremity injection        Follow Up:  Return if symptoms worsen or fail to improve  To Do Next Visit:  Re-evaluation of current issue    General Discussions:  Trigger Finger: The anatomy and physiology of trigger finger was discussed with the patient today in the office  Edema and increased contact pressure within the flexor tendons at the A1 pulley can cause pain, crepitation, and triggering or locking of the digit resulting in limitation of function  Symptoms can occur at anytime but are typically worse in the morning or after a brief rest from repetitive activity  Treatment options include resting/nighttime MP blocking splints to decrease edema, oral anti-inflammatory medications, home or formal therapy exercises, up to 2 steroid injections within the tendon sheath, or surgical release  While majority of patients do respond to conservative treatment, up to 20% may require surgical release         Scribe Attestation    I,:   Lois Resendiz am acting as a scribe while in the presence of the attending physician :        I,:   Esa Lord MD personally performed the services described in this documentation    as scribed in my presence :

## 2020-03-16 PROBLEM — J01.00 ACUTE NON-RECURRENT MAXILLARY SINUSITIS: Status: RESOLVED | Noted: 2019-01-02 | Resolved: 2020-03-16

## 2020-03-17 ENCOUNTER — OFFICE VISIT (OUTPATIENT)
Dept: FAMILY MEDICINE CLINIC | Facility: CLINIC | Age: 55
End: 2020-03-17
Payer: COMMERCIAL

## 2020-03-17 ENCOUNTER — APPOINTMENT (OUTPATIENT)
Dept: RADIOLOGY | Facility: MEDICAL CENTER | Age: 55
End: 2020-03-17
Payer: COMMERCIAL

## 2020-03-17 VITALS
WEIGHT: 212 LBS | TEMPERATURE: 97.5 F | RESPIRATION RATE: 16 BRPM | OXYGEN SATURATION: 98 % | SYSTOLIC BLOOD PRESSURE: 140 MMHG | HEART RATE: 87 BPM | BODY MASS INDEX: 36.19 KG/M2 | DIASTOLIC BLOOD PRESSURE: 82 MMHG | HEIGHT: 64 IN

## 2020-03-17 DIAGNOSIS — R73.01 ELEVATED FASTING BLOOD SUGAR: ICD-10-CM

## 2020-03-17 DIAGNOSIS — E04.1 THYROID NODULE: ICD-10-CM

## 2020-03-17 DIAGNOSIS — Z12.39 SCREENING FOR BREAST CANCER: Primary | ICD-10-CM

## 2020-03-17 DIAGNOSIS — R05.3 CHRONIC COUGH: ICD-10-CM

## 2020-03-17 DIAGNOSIS — E78.5 HYPERLIPIDEMIA, UNSPECIFIED HYPERLIPIDEMIA TYPE: ICD-10-CM

## 2020-03-17 DIAGNOSIS — E66.9 OBESITY (BMI 30.0-34.9): ICD-10-CM

## 2020-03-17 PROCEDURE — 1036F TOBACCO NON-USER: CPT | Performed by: PHYSICIAN ASSISTANT

## 2020-03-17 PROCEDURE — 3008F BODY MASS INDEX DOCD: CPT | Performed by: PHYSICIAN ASSISTANT

## 2020-03-17 PROCEDURE — 99214 OFFICE O/P EST MOD 30 MIN: CPT | Performed by: PHYSICIAN ASSISTANT

## 2020-03-17 PROCEDURE — 71046 X-RAY EXAM CHEST 2 VIEWS: CPT

## 2020-03-17 RX ORDER — AZITHROMYCIN 250 MG/1
TABLET, FILM COATED ORAL
Qty: 6 TABLET | Refills: 0 | Status: SHIPPED | OUTPATIENT
Start: 2020-03-17 | End: 2020-03-22

## 2020-03-17 RX ORDER — PREDNISONE 10 MG/1
10 TABLET ORAL 2 TIMES DAILY WITH MEALS
Qty: 10 TABLET | Refills: 0 | Status: SHIPPED | OUTPATIENT
Start: 2020-03-17 | End: 2020-03-23 | Stop reason: SDUPTHER

## 2020-03-17 NOTE — PROGRESS NOTES
BMI Counseling: Body mass index is 36 39 kg/m²  The BMI is above normal  Nutrition recommendations include decreasing portion sizes and moderation in carbohydrate intake  Exercise recommendations include exercising 3-5 times per week  No pharmacotherapy was ordered

## 2020-03-17 NOTE — PROGRESS NOTES
Assessment/Plan:     Diagnoses and all orders for this visit:    Screening for breast cancer  -     Mammo screening bilateral w cad; Future    Chronic cough  Comments:  P o  Antibiotics and steroids ordered  Will check chest x-ray  Suspect underlying reactive airway disease  Orders:  -     XR chest pa & lateral; Future  -     azithromycin (ZITHROMAX) 250 mg tablet; Take 2 tablets today then 1 tablet daily x 4 days  -     predniSONE 10 mg tablet; Take 1 tablet (10 mg total) by mouth 2 (two) times a day with meals    Thyroid nodule  Comments:  Patient due for ultrasound next year  Will check thyroid function studies  Orders:  -     TSH, 3rd generation  -     T4  -     T3    Hyperlipidemia, unspecified hyperlipidemia type  Comments: Will check labs  Patient is currently on no medication  Orders:  -     Comprehensive metabolic panel  -     Lipid panel    Obesity (BMI 30 0-34  9)  Comments:  Exercise and weight loss encouraged    Elevated fasting blood sugar  Comments:  Watch the carbs and sugars  Orders:  -     Hemoglobin A1C          Subjective:      Patient ID: Blessing Rodas is a 63 y o  female  Patient presents with a chronic cough  Patient states his cough is been present for months  Of the last couple weeks her chest is hurting and it is tight  He has a runny nose in the a m  Maribel Console She also gets into coughing fits  Cough is usually dry but occasionally she does cough up some phlegm    Patient is tried over-the-counter allergy meds as well as Flonase with no relief patient has no earache no sore throat and no fever      The following portions of the patient's history were reviewed and updated as appropriate:   She   Patient Active Problem List    Diagnosis Date Noted    Screening for breast cancer 01/02/2019    Bilateral thumb pain 01/02/2019    Lipoma 09/29/2017    Thyroid nodule 09/29/2017    Elevated fasting blood sugar 02/07/2017    Hyperlipidemia 02/07/2017    Obesity (BMI 30 0-34 9) 02/07/2017  Common migraine without aura 08/03/2012     Current Outpatient Medications   Medication Sig Dispense Refill    SUMAtriptan (IMITREX) 100 mg tablet Take 1 tablet (100 mg total) by mouth once as needed for migraine for up to 1 dose 9 tablet 0    azithromycin (ZITHROMAX) 250 mg tablet Take 2 tablets today then 1 tablet daily x 4 days 6 tablet 0    predniSONE 10 mg tablet Take 1 tablet (10 mg total) by mouth 2 (two) times a day with meals 10 tablet 0     No current facility-administered medications for this visit  Current Outpatient Medications on File Prior to Visit   Medication Sig    SUMAtriptan (IMITREX) 100 mg tablet Take 1 tablet (100 mg total) by mouth once as needed for migraine for up to 1 dose     No current facility-administered medications on file prior to visit  She has No Known Allergies       Review of Systems   Constitutional: Negative for activity change, appetite change, chills, fatigue and fever  HENT: Positive for congestion and rhinorrhea  Negative for ear pain, postnasal drip, sinus pressure, sinus pain and sore throat  Respiratory: Positive for cough  Objective:        Physical Exam   Constitutional: She is oriented to person, place, and time  She appears well-developed and well-nourished  No distress  Over  Weight   White   female   HENT:   Head: Normocephalic and atraumatic  Right Ear: Tympanic membrane, external ear and ear canal normal    Left Ear: Tympanic membrane, external ear and ear canal normal    Nose: Right sinus exhibits no maxillary sinus tenderness and no frontal sinus tenderness  Left sinus exhibits no maxillary sinus tenderness and no frontal sinus tenderness  Mouth/Throat: Oropharynx is clear and moist  No oropharyngeal exudate or posterior oropharyngeal erythema  Eyes: Pupils are equal, round, and reactive to light  Conjunctivae are normal    Neck: Carotid bruit is not present     Cardiovascular: Normal rate, regular rhythm and normal heart sounds  Exam reveals no gallop and no friction rub  No murmur heard  Pulmonary/Chest: Effort normal and breath sounds normal  No respiratory distress  She has no wheezes  Lymphadenopathy:     She has cervical adenopathy  Neurological: She is alert and oriented to person, place, and time  Skin: Skin is warm and dry  No rash noted  She is not diaphoretic  No erythema  Psychiatric: She has a normal mood and affect  Her behavior is normal  Judgment and thought content normal    Nursing note and vitals reviewed

## 2020-03-19 ENCOUNTER — HOSPITAL ENCOUNTER (OUTPATIENT)
Dept: RADIOLOGY | Facility: MEDICAL CENTER | Age: 55
Discharge: HOME | End: 2020-03-19
Payer: COMMERCIAL

## 2020-03-19 VITALS — WEIGHT: 212 LBS | BODY MASS INDEX: 36.19 KG/M2 | HEIGHT: 64 IN

## 2020-03-19 DIAGNOSIS — Z12.39 SCREENING FOR BREAST CANCER: ICD-10-CM

## 2020-03-19 PROCEDURE — 77067 SCR MAMMO BI INCL CAD: CPT

## 2020-03-23 ENCOUNTER — TELEPHONE (OUTPATIENT)
Dept: FAMILY MEDICINE CLINIC | Facility: CLINIC | Age: 55
End: 2020-03-23

## 2020-03-23 DIAGNOSIS — R05.3 CHRONIC COUGH: ICD-10-CM

## 2020-03-23 RX ORDER — PREDNISONE 10 MG/1
10 TABLET ORAL 2 TIMES DAILY WITH MEALS
Qty: 10 TABLET | Refills: 0 | Status: SHIPPED | OUTPATIENT
Start: 2020-03-23 | End: 2020-05-01 | Stop reason: ALTCHOICE

## 2020-03-23 NOTE — TELEPHONE ENCOUNTER
Spoke with patient  Patient states she fell really good she was on the prednisone  She finished p o   azithromycin  She states the cough has come back a little bit  Will refill the prednisone 1 more time  Patient is having bronchospasms and some reactive airway disease     with same this could be post viral

## 2020-03-23 NOTE — TELEPHONE ENCOUNTER
Pt called stating she feels the azithromycin & prednisone did help her  She completed both meds but is coughing a lot again  Pt would like to know if you would be willing to renew the prednisone

## 2020-03-31 ENCOUNTER — TELEPHONE (OUTPATIENT)
Dept: FAMILY MEDICINE CLINIC | Facility: CLINIC | Age: 55
End: 2020-03-31

## 2020-03-31 DIAGNOSIS — J30.9 ALLERGIC RHINITIS, UNSPECIFIED SEASONALITY, UNSPECIFIED TRIGGER: Primary | ICD-10-CM

## 2020-04-01 PROBLEM — J30.9 ALLERGIC RHINITIS: Status: ACTIVE | Noted: 2020-04-01

## 2020-04-01 RX ORDER — ALBUTEROL SULFATE 90 UG/1
2 AEROSOL, METERED RESPIRATORY (INHALATION) EVERY 6 HOURS PRN
Qty: 1 INHALER | Refills: 5 | Status: SHIPPED | OUTPATIENT
Start: 2020-04-01 | End: 2021-07-19 | Stop reason: SDUPTHER

## 2020-05-01 ENCOUNTER — TELEMEDICINE (OUTPATIENT)
Dept: FAMILY MEDICINE CLINIC | Facility: CLINIC | Age: 55
End: 2020-05-01
Payer: COMMERCIAL

## 2020-05-01 DIAGNOSIS — Z91.89 RISK OF EXPOSURE TO LYME DISEASE: Primary | ICD-10-CM

## 2020-05-01 PROCEDURE — 99213 OFFICE O/P EST LOW 20 MIN: CPT | Performed by: PHYSICIAN ASSISTANT

## 2020-05-01 RX ORDER — DOXYCYCLINE HYCLATE 100 MG
100 TABLET ORAL 2 TIMES DAILY
Qty: 20 TABLET | Refills: 0 | Status: SHIPPED | OUTPATIENT
Start: 2020-05-01 | End: 2020-05-11

## 2020-11-13 ENCOUNTER — OFFICE VISIT (OUTPATIENT)
Dept: OBGYN CLINIC | Facility: CLINIC | Age: 55
End: 2020-11-13
Payer: COMMERCIAL

## 2020-11-13 ENCOUNTER — APPOINTMENT (OUTPATIENT)
Dept: RADIOLOGY | Facility: CLINIC | Age: 55
End: 2020-11-13
Payer: COMMERCIAL

## 2020-11-13 VITALS
DIASTOLIC BLOOD PRESSURE: 83 MMHG | BODY MASS INDEX: 35.68 KG/M2 | HEART RATE: 89 BPM | WEIGHT: 209 LBS | HEIGHT: 64 IN | TEMPERATURE: 97.9 F | SYSTOLIC BLOOD PRESSURE: 145 MMHG

## 2020-11-13 DIAGNOSIS — M25.511 ACUTE PAIN OF RIGHT SHOULDER: ICD-10-CM

## 2020-11-13 DIAGNOSIS — M75.41 SHOULDER IMPINGEMENT, RIGHT: Primary | ICD-10-CM

## 2020-11-13 PROCEDURE — 3008F BODY MASS INDEX DOCD: CPT | Performed by: ORTHOPAEDIC SURGERY

## 2020-11-13 PROCEDURE — 99213 OFFICE O/P EST LOW 20 MIN: CPT | Performed by: ORTHOPAEDIC SURGERY

## 2020-11-13 PROCEDURE — 1036F TOBACCO NON-USER: CPT | Performed by: ORTHOPAEDIC SURGERY

## 2020-11-13 PROCEDURE — 20610 DRAIN/INJ JOINT/BURSA W/O US: CPT | Performed by: ORTHOPAEDIC SURGERY

## 2020-11-13 PROCEDURE — 73030 X-RAY EXAM OF SHOULDER: CPT

## 2020-11-13 RX ORDER — BUPIVACAINE HYDROCHLORIDE 2.5 MG/ML
2 INJECTION, SOLUTION INFILTRATION; PERINEURAL
Status: COMPLETED | OUTPATIENT
Start: 2020-11-13 | End: 2020-11-13

## 2020-11-13 RX ORDER — METHYLPREDNISOLONE ACETATE 40 MG/ML
2 INJECTION, SUSPENSION INTRA-ARTICULAR; INTRALESIONAL; INTRAMUSCULAR; SOFT TISSUE
Status: COMPLETED | OUTPATIENT
Start: 2020-11-13 | End: 2020-11-13

## 2020-11-13 RX ADMIN — METHYLPREDNISOLONE ACETATE 2 ML: 40 INJECTION, SUSPENSION INTRA-ARTICULAR; INTRALESIONAL; INTRAMUSCULAR; SOFT TISSUE at 15:04

## 2020-11-13 RX ADMIN — BUPIVACAINE HYDROCHLORIDE 2 ML: 2.5 INJECTION, SOLUTION INFILTRATION; PERINEURAL at 15:04

## 2021-02-11 ENCOUNTER — VBI (OUTPATIENT)
Dept: ADMINISTRATIVE | Facility: OTHER | Age: 56
End: 2021-02-11

## 2021-06-04 DIAGNOSIS — J30.9 ALLERGIC RHINITIS, UNSPECIFIED SEASONALITY, UNSPECIFIED TRIGGER: ICD-10-CM

## 2021-06-04 DIAGNOSIS — Z12.31 ENCOUNTER FOR SCREENING MAMMOGRAM FOR MALIGNANT NEOPLASM OF BREAST: Primary | ICD-10-CM

## 2021-07-01 ENCOUNTER — TELEPHONE (OUTPATIENT)
Dept: FAMILY MEDICINE CLINIC | Facility: CLINIC | Age: 56
End: 2021-07-01

## 2021-07-01 DIAGNOSIS — E78.5 HYPERLIPIDEMIA, UNSPECIFIED HYPERLIPIDEMIA TYPE: ICD-10-CM

## 2021-07-01 DIAGNOSIS — R73.01 ELEVATED FASTING BLOOD SUGAR: ICD-10-CM

## 2021-07-01 DIAGNOSIS — E04.1 THYROID NODULE: Primary | ICD-10-CM

## 2021-07-01 NOTE — TELEPHONE ENCOUNTER
Patient called asking if we could reorder her  lab orders that are in her chart  Please advise  Email [email or web address]  Call to let her know they're coming     Pt due for appt as well

## 2021-07-02 LAB
ALBUMIN SERPL-MCNC: 4.2 G/DL (ref 3.6–5.1)
ALBUMIN/GLOB SERPL: 1.4 (CALC) (ref 1–2.5)
ALP SERPL-CCNC: 95 U/L (ref 37–153)
ALT SERPL-CCNC: 20 U/L (ref 6–29)
AST SERPL-CCNC: 18 U/L (ref 10–35)
BILIRUB SERPL-MCNC: 0.5 MG/DL (ref 0.2–1.2)
BUN SERPL-MCNC: 12 MG/DL (ref 7–25)
BUN/CREAT SERPL: ABNORMAL (CALC) (ref 6–22)
CALCIUM SERPL-MCNC: 9.5 MG/DL (ref 8.6–10.4)
CHLORIDE SERPL-SCNC: 106 MMOL/L (ref 98–110)
CHOLEST SERPL-MCNC: 280 MG/DL
CHOLEST/HDLC SERPL: 6.2 (CALC)
CO2 SERPL-SCNC: 29 MMOL/L (ref 20–32)
CREAT SERPL-MCNC: 0.62 MG/DL (ref 0.5–1.05)
EST. AVERAGE GLUCOSE BLD GHB EST-MCNC: 160 (CALC)
EST. AVERAGE GLUCOSE BLD GHB EST-SCNC: 8.9 (CALC)
GLOBULIN SER CALC-MCNC: 3.1 G/DL (CALC) (ref 1.9–3.7)
GLUCOSE SERPL-MCNC: 146 MG/DL (ref 65–99)
HBA1C MFR BLD: 7.2 % OF TOTAL HGB
HDLC SERPL-MCNC: 45 MG/DL
LDLC SERPL CALC-MCNC: 200 MG/DL (CALC)
NONHDLC SERPL-MCNC: 235 MG/DL (CALC)
POTASSIUM SERPL-SCNC: 4.7 MMOL/L (ref 3.5–5.3)
PROT SERPL-MCNC: 7.3 G/DL (ref 6.1–8.1)
SL AMB EGFR AFRICAN AMERICAN: 116 ML/MIN/1.73M2
SL AMB EGFR NON AFRICAN AMERICAN: 100 ML/MIN/1.73M2
SODIUM SERPL-SCNC: 141 MMOL/L (ref 135–146)
T3FREE SERPL-MCNC: 3.3 PG/ML (ref 2.3–4.2)
T4 FREE SERPL-MCNC: 1 NG/DL (ref 0.8–1.8)
TRIGL SERPL-MCNC: 181 MG/DL
TSH SERPL-ACNC: 0.98 MIU/L (ref 0.4–4.5)

## 2021-07-05 NOTE — PROGRESS NOTES
CHIEF COMPLAINT:  Chief Complaint   Patient presents with    Right Thumb - Pain    Left Thumb - Pain       SUBJECTIVE:  Reji Knott is a 46y o  year old RHD female who presents to the office for bilateral thumb painful clicking and locking that has been going on for greater than  One month  Pt states that the left hand is more painful that the right Pt denies injury  Pt denies numbness and tingling  PAST MEDICAL HISTORY:  Past Medical History:   Diagnosis Date    Hyperlipidemia     Migraine     Seasonal allergies        PAST SURGICAL HISTORY:  Past Surgical History:   Procedure Laterality Date    HYSTERECTOMY      partial    WI COLONOSCOPY FLX DX W/COLLJ SPEC WHEN PFRMD N/A 3/17/2017    Procedure: COLONOSCOPY;  Surgeon: Josiah Tovar MD;  Location: [facility]; Service: Colorectal       FAMILY HISTORY:  Family History   Problem Relation Age of Onset    Other Mother         cardiac disorder    Stroke Mother         CVA    Diabetes Mother     Diabetes Sister     Hypertension Sister     Hypertension Brother        SOCIAL HISTORY:  Social History   Substance Use Topics    Smoking status: Never Smoker    Smokeless tobacco: Never Used    Alcohol use No      Comment: Social drinker per [employer]       MEDICATIONS:    Current Outpatient Prescriptions:     SUMAtriptan (IMITREX) 100 mg tablet, Take 1 tablet (100 mg total) by mouth once as needed for migraine for up to 1 dose, Disp: 9 tablet, Rfl: 0    ALLERGIES:  No Known Allergies    REVIEW OF SYSTEMS:  Review of Systems   Constitutional: Negative for chills, fever and unexpected weight change  HENT: Negative for hearing loss, nosebleeds and sore throat  Eyes: Negative for pain, redness and visual disturbance  Respiratory: Negative for cough, shortness of breath and wheezing  Cardiovascular: Negative for chest pain, palpitations and leg swelling  Gastrointestinal: Negative for abdominal pain, nausea and vomiting     Endocrine: Cholesterol profile is ok although LDL is NOT OPTIMAL but no medication is indicated, just recommend healthiest diet possible, increase exercise. Negative for polydipsia and polyuria  Genitourinary: Negative for dysuria and hematuria  Musculoskeletal: Negative for arthralgias, joint swelling and myalgias  Skin: Negative for rash and wound  Neurological: Negative for dizziness, numbness and headaches  Psychiatric/Behavioral: Negative for decreased concentration, dysphoric mood and suicidal ideas  The patient is not nervous/anxious  VITALS:  Vitals:    01/07/19 1507   BP: 127/84   Pulse: 84       LABS:  HgA1c:   Lab Results   Component Value Date    HGBA1C 6 2 (H) 10/30/2017     BMP:   Lab Results   Component Value Date    CALCIUM 9 3 10/30/2017     10/30/2017    K 4 4 10/30/2017    CO2 28 10/30/2017     10/30/2017    BUN 10 10/30/2017    CREATININE 0 76 10/30/2017       _____________________________________________________  PHYSICAL EXAMINATION:  General: well developed and well nourished, alert, oriented times 3 and appears comfortable  Psychiatric: Normal  HEENT: Trachea Midline, No torticollis  Pulmonary: No audible wheezing or respiratory distress   Skin: No masses, erthema, lacerations, fluctation, ulcerations  Neurovascular: Sensation Intact to the Median, Ulnar, Radial Nerve, Motor Intact to the Median, Ulnar, Radial Nerve and Pulses Intact    MUSCULOSKELETAL EXAMINATION:  Bilateral thumbs:  Positive palpable nodule over the A1 pulley  Positive tenderness to palpation over A1 pulley  Positive catching  Positive clicking         ___________________________________________________  STUDIES REVIEWED:  No studies reviewed         PROCEDURES PERFORMED:  Hand/upper extremity injection  Date/Time: 1/7/2019 3:37 PM  Consent given by: patient  Site marked: site marked  Timeout: Immediately prior to procedure a time out was called to verify the correct patient, procedure, equipment, support staff and site/side marked as required   Supporting Documentation  Indications: tendon swelling and pain   Procedure Details  Condition:trigger finger Location: thumb - R thumb A1   Preparation: Patient was prepped and draped in the usual sterile fashion  Ultrasound guidance: no  Medications administered: 0 5 mL lidocaine 1 %; 20 mg triamcinolone acetonide 40 mg/mL  Patient tolerance: patient tolerated the procedure well with no immediate complications  Dressing:  Sterile dressing applied   Hand/upper extremity injection  Date/Time: 1/7/2019 3:38 PM  Consent given by: patient  Site marked: site marked  Timeout: Immediately prior to procedure a time out was called to verify the correct patient, procedure, equipment, support staff and site/side marked as required   Supporting Documentation  Indications: tendon swelling and pain   Procedure Details  Condition:trigger finger Location: thumb - L thumb A1   Preparation: Patient was prepped and draped in the usual sterile fashion  Ultrasound guidance: no  Medications administered: 0 5 mL lidocaine 1 %; 20 mg triamcinolone acetonide 40 mg/mL  Patient tolerance: patient tolerated the procedure well with no immediate complications  Dressing:  Sterile dressing applied            _____________________________________________________  ASSESSMENT/PLAN:    bilateral trigger thumb  * cortisone steroid injections were administered today and bilateral thumb A1 pulleys without complications   * patient was advised to apply heat for discomfort and if her fingers become locked to help with on locking  * patient follow-up in the office in 2 weeks for reexamination        Follow Up:  Return in about 2 weeks (around 1/21/2019)  To Do Next Visit:  Re-evaluation of current issue    General Discussions:  Trigger Finger: The anatomy and physiology of trigger finger was discussed with the patient today in the office  Edema and increased contact pressure within the flexor tendons at the A1 pulley can cause pain, crepitation, and triggering or locking of the digit resulting in limitation of function    Symptoms can occur at anytime but are typically worse in the morning or after a brief rest from repetitive activity  Treatment options include resting/nighttime MP blocking splints to decrease edema, oral anti-inflammatory medications, home or formal therapy exercises, up to 2 steroid injections within the tendon sheath, or surgical release  While majority of patients do respond to conservative treatment, up to 20% may require surgical release         Scribe Attestation    I,:   Shai Harris am acting as a scribe while in the presence of the attending physician :        I,:   Balaji Rubalcava MD personally performed the services described in this documentation    as scribed in my presence :

## 2021-07-19 ENCOUNTER — OFFICE VISIT (OUTPATIENT)
Dept: FAMILY MEDICINE CLINIC | Facility: CLINIC | Age: 56
End: 2021-07-19
Payer: COMMERCIAL

## 2021-07-19 VITALS
TEMPERATURE: 97.2 F | HEART RATE: 84 BPM | OXYGEN SATURATION: 97 % | DIASTOLIC BLOOD PRESSURE: 76 MMHG | WEIGHT: 205 LBS | SYSTOLIC BLOOD PRESSURE: 122 MMHG | BODY MASS INDEX: 35 KG/M2 | HEIGHT: 64 IN

## 2021-07-19 DIAGNOSIS — J30.9 ALLERGIC RHINITIS, UNSPECIFIED SEASONALITY, UNSPECIFIED TRIGGER: ICD-10-CM

## 2021-07-19 DIAGNOSIS — E11.9 TYPE 2 DIABETES MELLITUS WITHOUT COMPLICATION, WITHOUT LONG-TERM CURRENT USE OF INSULIN (HCC): ICD-10-CM

## 2021-07-19 DIAGNOSIS — E78.5 HYPERLIPIDEMIA, UNSPECIFIED HYPERLIPIDEMIA TYPE: Primary | ICD-10-CM

## 2021-07-19 DIAGNOSIS — E66.01 CLASS 2 SEVERE OBESITY DUE TO EXCESS CALORIES WITH SERIOUS COMORBIDITY AND BODY MASS INDEX (BMI) OF 35.0 TO 35.9 IN ADULT (HCC): ICD-10-CM

## 2021-07-19 PROBLEM — E66.812 CLASS 2 SEVERE OBESITY DUE TO EXCESS CALORIES WITH SERIOUS COMORBIDITY IN ADULT (HCC): Status: ACTIVE | Noted: 2017-02-07

## 2021-07-19 PROCEDURE — 3725F SCREEN DEPRESSION PERFORMED: CPT | Performed by: PHYSICIAN ASSISTANT

## 2021-07-19 PROCEDURE — 1036F TOBACCO NON-USER: CPT | Performed by: PHYSICIAN ASSISTANT

## 2021-07-19 PROCEDURE — 3008F BODY MASS INDEX DOCD: CPT | Performed by: PHYSICIAN ASSISTANT

## 2021-07-19 PROCEDURE — 99214 OFFICE O/P EST MOD 30 MIN: CPT | Performed by: PHYSICIAN ASSISTANT

## 2021-07-19 PROCEDURE — 4010F ACE/ARB THERAPY RXD/TAKEN: CPT | Performed by: PHYSICIAN ASSISTANT

## 2021-07-19 RX ORDER — ROSUVASTATIN CALCIUM 10 MG/1
10 TABLET, COATED ORAL DAILY
Qty: 90 TABLET | Refills: 1 | Status: SHIPPED | OUTPATIENT
Start: 2021-07-19 | End: 2021-12-07 | Stop reason: SDUPTHER

## 2021-07-19 RX ORDER — LISINOPRIL 2.5 MG/1
2.5 TABLET ORAL DAILY
Qty: 90 TABLET | Refills: 1 | Status: SHIPPED | OUTPATIENT
Start: 2021-07-19

## 2021-07-19 RX ORDER — METFORMIN HYDROCHLORIDE 500 MG/1
500 TABLET, EXTENDED RELEASE ORAL
Qty: 90 TABLET | Refills: 1 | Status: SHIPPED | OUTPATIENT
Start: 2021-07-19 | End: 2022-01-27

## 2021-07-19 RX ORDER — ALBUTEROL SULFATE 90 UG/1
2 AEROSOL, METERED RESPIRATORY (INHALATION) EVERY 4 HOURS PRN
Qty: 6.7 G | Refills: 3 | Status: SHIPPED | OUTPATIENT
Start: 2021-07-19 | End: 2021-12-07 | Stop reason: SDUPTHER

## 2021-07-19 NOTE — PROGRESS NOTES
Diabetic Foot Exam    Patient's shoes and socks removed  Right Foot/Ankle   Right Foot Inspection  Skin Exam: skin intact no callus and no callus                          Toe Exam: right toe deformity  Sensory   Vibration: intact    Monofilament testing: intact  Vascular    The right DP pulse is 2+  Left Foot/Ankle  Left Foot Inspection  Skin Exam: skin intactno callus                         Toe Exam: left toe deformity                   Sensory   Vibration: intact    Monofilament: intact  Vascular    The left DP pulse is 2+  Assessment/Plan:     Diagnoses and all orders for this visit:    Hyperlipidemia, unspecified hyperlipidemia type  Comments: Will start statin therapy  Side effects reviewed with patient  Orders:  -     rosuvastatin (CRESTOR) 10 MG tablet; Take 1 tablet (10 mg total) by mouth daily  -     Hepatic function panel; Future  -     Lipid panel; Future    Type 2 diabetes mellitus without complication, without long-term current use of insulin ([facility] )  Comments:  New onset of type 2 diabetes  Start metformin  Sent to diabetic education classes  Orders:  -     metFORMIN (GLUCOPHAGE-XR) 500 mg 24 hr tablet; Take 1 tablet (500 mg total) by mouth daily with dinner  -     lisinopril (ZESTRIL) 2 5 mg tablet; Take 1 tablet (2 5 mg total) by mouth daily  -     Ambulatory referral to Diabetic Education; Future  -     Hemoglobin A1C; Future  -     Microalbumin / creatinine urine ratio; Future    Allergic rhinitis, unspecified seasonality, unspecified trigger  Comments:  Albuterol inhaler refilled for p r n  Use  Orders:  -     albuterol (PROVENTIL HFA,VENTOLIN HFA) 90 mcg/act inhaler; Inhale 2 puffs every 4 (four) hours as needed for wheezing or shortness of breath    Class 2 severe obesity due to excess calories with serious comorbidity and body mass index (BMI) of 35 0 to 35 9 in [facility])  Comments:  Exercise low carb low sugar diet to promote weight loss            Subjective:      Patient ID: Terance Merlin is a 61 y o  female     Patient presents in the office for follow up chronic conditions and also to review her abnormal labs  Patient has history of migraine headaches these are stable she uses Imitrex as needed  Patient has a history of hyperlipidemia  Her lipid panel is way out of whack  We are going to start statin therapy  Total cholesterol is 280 LDL is 200  History of elevated fasting blood sugar which now has progressed to diabetes  Her fasting blood sugars 146 her A1c is 7 2  We are going to start metformin  once a day  We discussed weight loss drugs with the G LP 1 receptors  Patient will research the cost of Trulicity and/or Ozempic and Victoza  Patient is also agreeable to go to diabetic classes    Hepatic functions are normal renal functions are normal her thyroid panel was normal         The following portions of the patient's history were reviewed and updated as appropriate:   She   Patient Active Problem List    Diagnosis Date Noted    Allergic rhinitis 04/01/2020    Screening for breast cancer 01/02/2019    Bilateral thumb pain 01/02/2019    Lipoma 09/29/2017    Thyroid nodule 09/29/2017    Type 2 diabetes mellitus without complication, without long-term current use of insulin ([facility] ) 02/07/2017    Hyperlipidemia 02/07/2017    Class 2 severe obesity due to excess calories with serious comorbidity in adult [facility]) 02/07/2017    Common migraine without aura 08/03/2012     Current Outpatient Medications   Medication Sig Dispense Refill    albuterol (PROVENTIL HFA,VENTOLIN HFA) 90 mcg/act inhaler Inhale 2 puffs every 4 (four) hours as needed for wheezing or shortness of breath 6 7 g 3    SUMAtriptan (IMITREX) 100 mg tablet Take 1 tablet (100 mg total) by mouth once as needed for migraine for up to 1 dose 9 tablet 0    lisinopril (ZESTRIL) 2 5 mg tablet Take 1 tablet (2 5 mg total) by mouth daily 90 tablet 1    metFORMIN (GLUCOPHAGE-XR) 500 mg 24 hr tablet Take 1 tablet (500 mg total) by mouth daily with dinner 90 tablet 1    rosuvastatin (CRESTOR) 10 MG tablet Take 1 tablet (10 mg total) by mouth daily 90 tablet 1     No current facility-administered medications for this visit  She has No Known Allergies       Review of Systems   Constitutional: Negative for unexpected weight change  HENT: Negative for ear pain and sore throat  Eyes: Negative for visual disturbance  Respiratory: Negative for cough, shortness of breath and wheezing  Cardiovascular: Negative for chest pain and leg swelling  Gastrointestinal: Negative for abdominal pain, blood in stool, constipation, diarrhea, nausea and vomiting  Genitourinary: Negative for difficulty urinating  Musculoskeletal: Negative for arthralgias and myalgias  Skin: Negative for rash  Neurological: Negative for dizziness, syncope, light-headedness and headaches  Psychiatric/Behavioral: Negative for self-injury, sleep disturbance and suicidal ideas  The patient is not nervous/anxious  Objective:        Physical Exam  Vitals and nursing note reviewed  Constitutional:       General: She is not in acute distress  Appearance: She is well-developed  She is obese  She is not diaphoretic  HENT:      Head: Normocephalic and atraumatic  Right Ear: Tympanic membrane, ear canal and external ear normal       Left Ear: Tympanic membrane, ear canal and external ear normal       Mouth/Throat:      Pharynx: No posterior oropharyngeal erythema  Eyes:      Conjunctiva/sclera: Conjunctivae normal       Pupils: Pupils are equal, round, and reactive to light  Neck:      Thyroid: No thyromegaly  Vascular: No carotid bruit  Cardiovascular:      Rate and Rhythm: Normal rate and regular rhythm  Pulses:           Dorsalis pedis pulses are 2+ on the right side and 2+ on the left side  Heart sounds: Normal heart sounds  No murmur heard  No friction rub  No gallop      Pulmonary:      Effort: Pulmonary effort is normal  No respiratory distress  Breath sounds: Normal breath sounds  No wheezing  Abdominal:      Palpations: Abdomen is soft  Musculoskeletal:      Right lower leg: No edema  Left lower leg: No edema  Feet:      Right foot:      Skin integrity: No callus  Left foot:      Skin integrity: No callus  Lymphadenopathy:      Cervical: No cervical adenopathy  Skin:     General: Skin is warm and dry  Findings: No erythema or rash  Neurological:      General: No focal deficit present  Mental Status: She is alert and oriented to person, place, and time  Psychiatric:         Behavior: Behavior normal          Thought Content:  Thought content normal          Judgment: Judgment normal

## 2021-08-03 ENCOUNTER — TELEPHONE (OUTPATIENT)
Dept: FAMILY MEDICINE CLINIC | Facility: CLINIC | Age: 56
End: 2021-08-03

## 2021-09-29 ENCOUNTER — HOSPITAL ENCOUNTER (OUTPATIENT)
Dept: RADIOLOGY | Facility: MEDICAL CENTER | Age: 56
Discharge: HOME | End: 2021-09-29
Payer: COMMERCIAL

## 2021-09-29 VITALS — BODY MASS INDEX: 35 KG/M2 | HEIGHT: 64 IN | WEIGHT: 205 LBS

## 2021-09-29 DIAGNOSIS — Z12.31 ENCOUNTER FOR SCREENING MAMMOGRAM FOR MALIGNANT NEOPLASM OF BREAST: ICD-10-CM

## 2021-09-29 PROCEDURE — 77067 SCR MAMMO BI INCL CAD: CPT

## 2021-09-29 PROCEDURE — 77063 BREAST TOMOSYNTHESIS BI: CPT

## 2021-12-07 DIAGNOSIS — E78.5 HYPERLIPIDEMIA, UNSPECIFIED HYPERLIPIDEMIA TYPE: ICD-10-CM

## 2021-12-07 DIAGNOSIS — J30.9 ALLERGIC RHINITIS, UNSPECIFIED SEASONALITY, UNSPECIFIED TRIGGER: ICD-10-CM

## 2021-12-07 RX ORDER — ALBUTEROL SULFATE 90 UG/1
2 AEROSOL, METERED RESPIRATORY (INHALATION) EVERY 4 HOURS PRN
Qty: 6.7 G | Refills: 3 | Status: SHIPPED | OUTPATIENT
Start: 2021-12-07

## 2021-12-07 RX ORDER — ROSUVASTATIN CALCIUM 10 MG/1
10 TABLET, COATED ORAL DAILY
Qty: 90 TABLET | Refills: 1 | Status: SHIPPED | OUTPATIENT
Start: 2021-12-07

## 2022-01-17 ENCOUNTER — TELEMEDICINE (OUTPATIENT)
Dept: FAMILY MEDICINE CLINIC | Facility: CLINIC | Age: 57
End: 2022-01-17
Payer: COMMERCIAL

## 2022-01-17 DIAGNOSIS — J06.9 UPPER RESPIRATORY TRACT INFECTION, UNSPECIFIED TYPE: ICD-10-CM

## 2022-01-17 DIAGNOSIS — U07.1 COVID-19: Primary | ICD-10-CM

## 2022-01-17 PROCEDURE — 99213 OFFICE O/P EST LOW 20 MIN: CPT | Performed by: PHYSICIAN ASSISTANT

## 2022-01-17 RX ORDER — AZITHROMYCIN 250 MG/1
TABLET, FILM COATED ORAL
Qty: 6 TABLET | Refills: 0 | Status: SHIPPED | OUTPATIENT
Start: 2022-01-17 | End: 2022-01-21

## 2022-01-17 NOTE — PROGRESS NOTES
COVID-19 Outpatient Progress Note    Assessment/Plan:    Problem List Items Addressed This Visit     None      Visit Diagnoses     COVID-19    -  Primary    Upper respiratory tract infection, unspecified type             Disposition:     I have spent 6 minutes directly with the patient  Greater than 50% of this time was spent in counseling/coordination of care regarding: instructions for management  Patient states she has been sick with upper respiratory symptoms since January 6  A home rapid COVID test was positive on January 8th  Patient states she stills not feel good  She says he has fevers at night  She has a cough and a runny nose  He has no short of breath  She has some nausea and intermittent diarrhea  She is on vaccinated  Patient will continue her over-the-counter Tylenol and Robitussin cough medicine  Going to add p o  Azithromycin       Encounter provider Frantz Floyd PA-C    Provider located at [address] [place] [id number] [phone]    Recent Visits  No visits were found meeting these conditions  Showing recent visits within past 7 days and meeting all other requirements  Today's Visits  Date Type Provider Dept   01/17/22 Telemedicine YAHAIRA Cohen [facility]   Showing today's visits and meeting all other requirements  Future Appointments  No visits were found meeting these conditions  Showing future appointments within next 150 days and meeting all other requirements     This virtual check-in was done via [facility] and patient was informed that this is a secure, HIPAA-compliant platform  She agrees to proceed  Patient agrees to participate in a virtual check in via telephone or video visit instead of presenting to the office to address urgent/immediate medical needs  Patient is aware this is a billable service  After connecting through [facility], the patient was identified by name and date of birth  Christopher Patten was informed that this was a telemedicine visit and that the exam was being conducted confidentially over secure lines  No one else was in the room  Christopher Patten acknowledged consent and understanding of privacy and security of the telemedicine visit  I informed the patient that I have reviewed her record in Epic and presented the opportunity for her to ask any questions regarding the visit today  The patient agreed to participate  Verification of patient location:  Patient is located in the following state in which I hold an active license: [place]    Subjective:   Christopher Patten is a 61 y o  female who has been screened for COVID-19  Symptom change since last report: unchanged  Patient's symptoms include fever, nasal congestion, cough and diarrhea  Patient denies shortness of breath and chest tightness      - Date of symptom onset: 1/6/2022  - Date of positive COVID-19 test: 1/8/2022  Type of test: Home antigen  COVID-19 vaccination status: Not vaccinated    Jaylin Mosley has been staying home and has isolated themselves in her home  She is taking care to not share personal items and is cleaning all surfaces that are touched often, like counters, tabletops, and doorknobs using household cleaning sprays or wipes  She is wearing a mask when she leaves her room  No results found for: [address] [facility], [address] [facility], [place]SC[place]AVI, CORONAVIRUSR, [facility] [place]  Past Medical History:   Diagnosis Date    Hyperlipidemia     Migraine     Seasonal allergies      Past Surgical History:   Procedure Laterality Date    HYSTERECTOMY      partial    IN COLONOSCOPY FLX DX W/COLLJ SPEC WHEN PFRMD N/A 3/17/2017    Procedure: COLONOSCOPY;  Surgeon: Damaris Whitley MD;  Location: [facility];   Service: Colorectal     Current Outpatient Medications   Medication Sig Dispense Refill    albuterol (PROVENTIL HFA,VENTOLIN HFA) 90 mcg/act inhaler Inhale 2 puffs every 4 (four) hours as needed for wheezing or shortness of breath 6 7 g 3    lisinopril (ZESTRIL) 2 5 mg tablet Take 1 tablet (2 5 mg total) by mouth daily 90 tablet 1    metFORMIN (GLUCOPHAGE-XR) 500 mg 24 hr tablet Take 1 tablet (500 mg total) by mouth daily with dinner 90 tablet 1    rosuvastatin (CRESTOR) 10 MG tablet Take 1 tablet (10 mg total) by mouth daily 90 tablet 1    SUMAtriptan (IMITREX) 100 mg tablet Take 1 tablet (100 mg total) by mouth once as needed for migraine for up to 1 dose 9 tablet 0     No current facility-administered medications for this visit  No Known Allergies    Review of Systems   Constitutional: Positive for fever  HENT: Positive for congestion  Respiratory: Positive for cough  Negative for chest tightness and shortness of breath  Gastrointestinal: Positive for diarrhea  Objective: There were no vitals filed for this visit  Physical Exam  Constitutional:       General: She is not in acute distress  Appearance: Normal appearance  She is not ill-appearing  HENT:      Head: Normocephalic and atraumatic  Right Ear: External ear normal       Left Ear: External ear normal    Eyes:      Conjunctiva/sclera: Conjunctivae normal    Pulmonary:      Effort: Pulmonary effort is normal    Skin:     Coloration: Skin is not pale  Findings: Erythema present  Neurological:      Mental Status: She is alert  VIRTUAL VISIT [id number] [facility] verbally agrees to participate in [employer]  Pt is aware that [employer] could be limited without vital signs or the ability to perform a full hands-on physical Maybelle Mooring understands she or the provider may request at any time to terminate the video visit and request the patient to seek care or treatment in person

## 2022-01-20 ENCOUNTER — TELEPHONE (OUTPATIENT)
Dept: FAMILY MEDICINE CLINIC | Facility: CLINIC | Age: 57
End: 2022-01-20

## 2022-01-20 NOTE — TELEPHONE ENCOUNTER
Call from patient  Most symptoms have improved  Still has cough and "gurgling" in chest  Patient does not have a fever  Please call at [phone]

## 2022-01-21 NOTE — TELEPHONE ENCOUNTER
Spoke with patient  Patient states he is much better since having COVID however she has a lingering cough  Cough in the morning with a lot of postnasal drip  And then it returns at the end of the day  She has no fever shortness of breath  She has taken 2 bottles of cough medicine already    Patient instructed to switch to Mucinex and take over-the-counter Claritin may use Proventil inhaler as needed

## 2022-01-27 ENCOUNTER — OFFICE VISIT (OUTPATIENT)
Dept: FAMILY MEDICINE CLINIC | Facility: CLINIC | Age: 57
End: 2022-01-27
Payer: COMMERCIAL

## 2022-01-27 ENCOUNTER — APPOINTMENT (OUTPATIENT)
Dept: RADIOLOGY | Facility: MEDICAL CENTER | Age: 57
End: 2022-01-27
Payer: COMMERCIAL

## 2022-01-27 VITALS
HEIGHT: 64 IN | WEIGHT: 205 LBS | DIASTOLIC BLOOD PRESSURE: 82 MMHG | TEMPERATURE: 97.4 F | OXYGEN SATURATION: 96 % | HEART RATE: 92 BPM | SYSTOLIC BLOOD PRESSURE: 130 MMHG | BODY MASS INDEX: 35 KG/M2

## 2022-01-27 DIAGNOSIS — Z86.16 PERSONAL HISTORY OF COVID-19: ICD-10-CM

## 2022-01-27 DIAGNOSIS — R06.02 SHORTNESS OF BREATH ON EXERTION: ICD-10-CM

## 2022-01-27 DIAGNOSIS — E66.01 CLASS 2 SEVERE OBESITY DUE TO EXCESS CALORIES WITH SERIOUS COMORBIDITY AND BODY MASS INDEX (BMI) OF 35.0 TO 35.9 IN ADULT (HCC): ICD-10-CM

## 2022-01-27 DIAGNOSIS — E11.9 TYPE 2 DIABETES MELLITUS WITHOUT COMPLICATION, WITHOUT LONG-TERM CURRENT USE OF INSULIN (HCC): Primary | ICD-10-CM

## 2022-01-27 PROCEDURE — 99214 OFFICE O/P EST MOD 30 MIN: CPT | Performed by: PHYSICIAN ASSISTANT

## 2022-01-27 PROCEDURE — 71046 X-RAY EXAM CHEST 2 VIEWS: CPT

## 2022-01-27 PROCEDURE — 3008F BODY MASS INDEX DOCD: CPT | Performed by: PHYSICIAN ASSISTANT

## 2022-01-27 PROCEDURE — 1036F TOBACCO NON-USER: CPT | Performed by: PHYSICIAN ASSISTANT

## 2022-01-27 RX ORDER — DEXAMETHASONE 4 MG/1
2 TABLET ORAL 2 TIMES DAILY
Qty: 12 G | Refills: 1 | Status: SHIPPED | OUTPATIENT
Start: 2022-01-27 | End: 2022-01-28

## 2022-01-27 NOTE — PROGRESS NOTES
Patient Name: Rey Diaz     : 1965     MRN: [id number]    Assessment/Plan:    Problem List Items Addressed This Visit        Endocrine    Type 2 diabetes mellitus without complication, without long-term current use of insulin ([facility] ) - Primary       Other    Class 2 severe obesity due to excess calories with serious comorbidity in adult [facility])      Other Visit Diagnoses     Personal history of COVID-19        Relevant Medications    fluticasone (Flovent HFA) 110 MCG/ACT inhaler    Other Relevant Orders    CBC and differential    XR chest pa & lateral    Shortness of breath on exertion        Check chest x-ray  Add steroid inhaler  Relevant Medications    fluticasone (Flovent HFA) 110 MCG/ACT inhaler        Discussion:     Labs recommended:  CBC    Diagnostic testing recommended:  Chest x-ray    Other management recommendations:     Dyspnea & cough   Recommend cough suppressant (eg, benzonatate, guaifenesin, dextromethorphan)  Recommend inhaled bronchodilator as prescribed  Recommend inhaled corticosteroid as prescribed      BMI Counseling: Body mass index is 35 19 kg/m²  The BMI is above normal  Nutrition recommendations include decreasing portion sizes and moderation in carbohydrate intake  Exercise recommendations include exercising 3-5 times per week  Rationale for BMI follow-up plan is due to patient being overweight or obese  I spent 20 minutes directly with the patient during this visit     Subjective:    COVID-19 Infection:  Date of symptom onset: 2022  Date of positive test: 2022    Initial symptoms with acute illness:  Initial symptoms included: fever, cough, rhinorrhea, nasal congestion and sore throat  New or persistent symptoms:  Patient complains of: fatigue, poor endurance, cough and chest discomfort  Review of Systems   Constitutional: Positive for fatigue  Respiratory: Positive for cough           Patient Active Problem List   Diagnosis    Common migraine without aura    Type 2 diabetes mellitus without complication, without long-term current use of insulin (HCC)    Hyperlipidemia    Lipoma    Class 2 severe obesity due to excess calories with serious comorbidity in adult [facility])    Thyroid nodule    Screening for breast cancer    Bilateral thumb pain    Allergic rhinitis     Social History     Tobacco Use    Smoking status: Never Smoker    Smokeless tobacco: Never Used   Vaping Use    Vaping Use: Never used   Substance Use Topics    Alcohol use: No     Comment: Social drinker per [employer]    Drug use: No      Objective:  /82 (BP Location: Left arm, Patient Position: Sitting, Cuff Size: Standard)   Pulse 92   Temp (!) 97 4 °F (36 3 °C)   Ht 5' 4" (1 626 m)   Wt 93 kg (205 lb)   SpO2 96%   BMI 35 19 kg/m²      Physical Exam  Vitals and nursing note reviewed  Constitutional:       Appearance: Normal appearance  She is obese  She is not ill-appearing  HENT:      Head: Normocephalic and atraumatic  Right Ear: Tympanic membrane, ear canal and external ear normal       Left Ear: Tympanic membrane, ear canal and external ear normal    Eyes:      Conjunctiva/sclera: Conjunctivae normal    Cardiovascular:      Rate and Rhythm: Normal rate and regular rhythm  Heart sounds: Normal heart sounds  No murmur heard  Pulmonary:      Effort: Pulmonary effort is normal       Breath sounds: Normal breath sounds  No wheezing or rales  Skin:     General: Skin is warm and dry  Neurological:      General: No focal deficit present  Mental Status: She is alert and oriented to person, place, and time  Psychiatric:         Mood and Affect: Mood normal          Behavior: Behavior normal          Thought Content:  Thought content normal          Judgment: Judgment normal          Paxton Valadez PA-C

## 2022-01-28 ENCOUNTER — TELEPHONE (OUTPATIENT)
Dept: FAMILY MEDICINE CLINIC | Facility: CLINIC | Age: 57
End: 2022-01-28

## 2022-01-28 DIAGNOSIS — R06.02 SHORTNESS OF BREATH ON EXERTION: ICD-10-CM

## 2022-01-28 DIAGNOSIS — Z86.16 PERSONAL HISTORY OF COVID-19: Primary | ICD-10-CM

## 2022-01-28 RX ORDER — BECLOMETHASONE DIPROPIONATE HFA 80 UG/1
2 AEROSOL, METERED RESPIRATORY (INHALATION) 2 TIMES DAILY
Qty: 10.6 G | Refills: 1 | Status: SHIPPED | OUTPATIENT
Start: 2022-01-28

## 2022-01-28 NOTE — TELEPHONE ENCOUNTER
Patient was prescribed Flovent HFA during appointment yesterday  Patient said that [facility] wants to charge her $267 50 for the medication, stating that it does not come in a generic brand    Can a substitute for this medication be sent into [facility]?

## 2022-02-21 ENCOUNTER — TELEPHONE (OUTPATIENT)
Dept: FAMILY MEDICINE CLINIC | Facility: CLINIC | Age: 57
End: 2022-02-21

## 2022-02-21 DIAGNOSIS — E11.9 TYPE 2 DIABETES MELLITUS WITHOUT COMPLICATION, WITHOUT LONG-TERM CURRENT USE OF INSULIN (HCC): Primary | ICD-10-CM

## 2022-02-21 NOTE — TELEPHONE ENCOUNTER
Patient called in about diabetic medication  She's been doing research and wants to try trulicity  PLease advise  She's meet her deductible now and wasn't to try it    [facility]

## 2022-03-08 ENCOUNTER — TELEPHONE (OUTPATIENT)
Dept: FAMILY MEDICINE CLINIC | Facility: CLINIC | Age: 57
End: 2022-03-08

## 2022-03-11 ENCOUNTER — TELEPHONE (OUTPATIENT)
Dept: FAMILY MEDICINE CLINIC | Facility: CLINIC | Age: 57
End: 2022-03-11

## 2022-09-06 ENCOUNTER — APPOINTMENT (OUTPATIENT)
Dept: RADIOLOGY | Facility: MEDICAL CENTER | Age: 57
End: 2022-09-06
Payer: COMMERCIAL

## 2022-09-06 ENCOUNTER — OFFICE VISIT (OUTPATIENT)
Dept: OBGYN CLINIC | Facility: MEDICAL CENTER | Age: 57
End: 2022-09-06
Payer: COMMERCIAL

## 2022-09-06 VITALS
HEIGHT: 64 IN | WEIGHT: 196 LBS | SYSTOLIC BLOOD PRESSURE: 136 MMHG | DIASTOLIC BLOOD PRESSURE: 82 MMHG | HEART RATE: 70 BPM | BODY MASS INDEX: 33.46 KG/M2

## 2022-09-06 DIAGNOSIS — M25.562 LEFT KNEE PAIN, UNSPECIFIED CHRONICITY: ICD-10-CM

## 2022-09-06 DIAGNOSIS — M17.12 PRIMARY OSTEOARTHRITIS OF LEFT KNEE: ICD-10-CM

## 2022-09-06 DIAGNOSIS — M25.562 ACUTE PAIN OF LEFT KNEE: Primary | ICD-10-CM

## 2022-09-06 PROCEDURE — 99213 OFFICE O/P EST LOW 20 MIN: CPT | Performed by: EMERGENCY MEDICINE

## 2022-09-06 PROCEDURE — 73562 X-RAY EXAM OF KNEE 3: CPT

## 2022-09-06 PROCEDURE — 20610 DRAIN/INJ JOINT/BURSA W/O US: CPT | Performed by: EMERGENCY MEDICINE

## 2022-09-06 RX ORDER — TRIAMCINOLONE ACETONIDE 40 MG/ML
40 INJECTION, SUSPENSION INTRA-ARTICULAR; INTRAMUSCULAR
Status: COMPLETED | OUTPATIENT
Start: 2022-09-06 | End: 2022-09-06

## 2022-09-06 RX ORDER — BUPIVACAINE HYDROCHLORIDE 5 MG/ML
3.5 INJECTION, SOLUTION PERINEURAL
Status: COMPLETED | OUTPATIENT
Start: 2022-09-06 | End: 2022-09-06

## 2022-09-06 RX ADMIN — BUPIVACAINE HYDROCHLORIDE 3.5 ML: 5 INJECTION, SOLUTION PERINEURAL at 12:49

## 2022-09-06 RX ADMIN — TRIAMCINOLONE ACETONIDE 40 MG: 40 INJECTION, SUSPENSION INTRA-ARTICULAR; INTRAMUSCULAR at 12:49

## 2022-09-06 NOTE — PROGRESS NOTES
Assessment/Plan:    Diagnoses and all orders for this visit:    Acute pain of left knee  -     XR knee 3 vw left non injury; Future  -     Large joint arthrocentesis: L knee    Primary osteoarthritis of left knee  -     Large joint arthrocentesis: L knee    Possible Bakers cyst  CSI today  If no improvement t/c MRI  You may use Advil (ibuprofen) 600mg every 6 hours or at least twice per day OR Aleve (naproxen) 250-500mg every 12 hours as needed for pain and inflammation  You may also take Tylenol 500mg every 4-6 hours as needed OR max 1,000mg per dose up to 3 times per day for a total of 3,000mg per day  Check with your primary care physician to see if these medications are safe to take and to make sure they do not interfere with your other medications and medical issues  Return in about 4 weeks (around 10/4/2022)  Chief Complaint:     Chief Complaint   Patient presents with    Left Knee - Pain     Back of the knee         Subjective:   Patient ID: Dylan Ford is a 61 y o  female  Patient new to me presents for 1 month of left posterior knee pain which she states began one mornign upon waking up, denies injury or any activities out of the ordinary  Denies SOB, no risk factors for DVT  Review of Systems    The following portions of the patient's chart were reviewed and updated as appropriate: Allergy:  No Known Allergies      Past Medical History:   Diagnosis Date    Hyperlipidemia     Migraine     Seasonal allergies        Past Surgical History:   Procedure Laterality Date    HYSTERECTOMY      partial    GA COLONOSCOPY FLX DX W/COLLJ SPEC WHEN PFRMD N/A 3/17/2017    Procedure: COLONOSCOPY;  Surgeon: Semaj Castillo MD;  Location: [facility];   Service: Colorectal       Social History     Socioeconomic History    Marital status: Single     Spouse name: Not on file    Number of children: Not on file    Years of education: Not on file    Highest education level: Not on file Occupational History    Not on file   Tobacco Use    Smoking status: Never Smoker    Smokeless tobacco: Never Used   Vaping Use    Vaping Use: Never used   Substance and Sexual Activity    Alcohol use: No     Comment: Social drinker per [employer]    Drug use: No    Sexual activity: Not on file   Other Topics Concern    Not on file   Social History Narrative    Uses safety equipment-seat belts     Social Determinants of Health     Financial Resource Strain: Not on file   Food Insecurity: Not on file   Transportation Needs: Not on file   Physical Activity: Not on file   Stress: Not on file   Social Connections: Not on file   Intimate Partner Violence: Not on file   Housing Stability: Not on file       Family History   Problem Relation Age of Onset    Other Mother         cardiac disorder    Stroke Mother         CVA    Diabetes Mother     Diabetes Sister     Hypertension Sister     Hypertension Brother     No Known Problems Father     No Known Problems Maternal Grandmother     No Known Problems Maternal Grandfather     No Known Problems Paternal Grandmother     No Known Problems Paternal Grandfather        Medications:    Current Outpatient Medications:     albuterol (PROVENTIL HFA,VENTOLIN HFA) 90 mcg/act inhaler, Inhale 2 puffs every 4 (four) hours as needed for wheezing or shortness of breath, Disp: 6 7 g, Rfl: 3    beclomethasone (Qvar RediHaler) 80 MCG/ACT inhaler, Inhale 2 puffs 2 (two) times a day Rinse mouth after use , Disp: 10 6 g, Rfl: 1    lisinopril (ZESTRIL) 2 5 mg tablet, Take 1 tablet (2 5 mg total) by mouth daily, Disp: 90 tablet, Rfl: 1    rosuvastatin (CRESTOR) 10 MG tablet, Take 1 tablet (10 mg total) by mouth daily, Disp: 90 tablet, Rfl: 1    SUMAtriptan (IMITREX) 100 mg tablet, Take 1 tablet (100 mg total) by mouth once as needed for migraine for up to 1 dose, Disp: 9 tablet, Rfl: 0    Trulicity 4 61 XE/2 5MC SOPN, INJECT 0 5 MILLILITERS UNDER THE SKIN ONE TIME PER WEEK, Disp: 2 mL, Rfl: 0    Patient Active Problem List   Diagnosis    Common migraine without aura    Type 2 diabetes mellitus without complication, without long-term current use of insulin (HCC)    Hyperlipidemia    Lipoma    Class 2 severe obesity due to excess calories with serious comorbidity in adult [facility])    Thyroid nodule    Screening for breast cancer    Bilateral thumb pain    Allergic rhinitis       Objective:  /82   Pulse 70   Ht 5' 4" (1 626 m)   Wt 88 9 kg (196 lb)   BMI 33 64 kg/m²     Left Knee Exam     Range of Motion   Extension: normal     Tests   Echo:  Medial - negative Lateral - negative    Other   Erythema: absent  Sensation: normal  Effusion: no effusion present    Comments:  Posterior knee nontender          Observations   Left Knee   Negative for effusion  Physical Exam  Musculoskeletal:      Left knee: No effusion  Instability Tests: Medial Echo test negative and lateral Echo test negative  Neurologic Exam    Large joint arthrocentesis: L knee  Universal Protocol:  Consent: Verbal consent obtained  Risks and benefits: risks, benefits and alternatives were discussed  Consent given by: patient  Time out: Immediately prior to procedure a "time out" was called to verify the correct patient, procedure, equipment, support staff and site/side marked as required    Timeout called at: 9/6/2022 12:46 PM   Patient understanding: patient states understanding of the procedure being performed  Test results: test results available and properly labeled  Site marked: the operative site was marked  Patient identity confirmed: verbally with patient    Supporting Documentation  Indications: pain   Procedure Details  Location: knee - L knee  Preparation: Patient was prepped and draped in the usual sterile fashion  Needle size: 22 G  Ultrasound guidance: no  Approach: anterolateral  Medications administered: 40 mg triamcinolone acetonide 40 mg/mL; 3 5 mL bupivacaine 0 5 %    Patient tolerance: patient tolerated the procedure well with no immediate complications  Dressing:  Sterile dressing applied    No erythema of knees          I have personally reviewed pertinent films in PACS and my interpretation is Xrays Left knee reveal mild DJD no acute findings, no blastic osseous lesions

## 2022-09-06 NOTE — PATIENT INSTRUCTIONS
You may use Advil (ibuprofen) 600mg every 6 hours or at least twice per day OR Aleve (naproxen) 250-500mg every 12 hours as needed for pain and inflammation  You may also take Tylenol 500mg every 4-6 hours as needed OR max 1,000mg per dose up to 3 times per day for a total of 3,000mg per day  Check with your primary care physician to see if these medications are safe to take and to make sure they do not interfere with your other medications and medical issues  Steroid Joint Injection   AMBULATORY CARE:   What you need to know about steroid joint injection:  A steroid joint injection is a procedure to inject steroid medicine into a joint  Steroid medicine decreases pain and inflammation  The injection may also contain an anesthetic (numbing medicine) to decrease pain  It may be done to treat conditions such as arthritis, gout, or carpal tunnel syndrome  The injections may be given in your knee, ankle, shoulder, elbow, wrist, or ankle  How to prepare for steroid joint injection:  Your healthcare provider will talk to you about how to prepare for this procedure  He will tell you what medicines to take or not take on the day of your procedure  You may need to stop taking blood thinners several days before your procedure  What will happen during steroid joint injection:  You may be given local anesthesia to numb the area where the injection will be given  With local anesthesia, you may still feel pressure during the procedure, but you should not feel any pain  Your healthcare provider may use ultrasound or fluoroscopy (a type of x-ray) to guide the needle to the right area  He will then inject the steroid into your joint  A bandage will be placed on the injection site  What will happen after steroid joint injection:  You may have redness, warmth, or sweating in your face and chest right after the steroid injection  Steroids can affect blood sugar levels   If you have diabetes, you should check your blood sugars closely in the first 24 hours after your procedure  Risks of steroid joint injection:  You may get an infection in your joint  The injection may also cause more pain during the first 24 to 36 hours  You may need more than one injection to feel pain relief  The skin near the injection site may be damaged and become discolored or indented  This can happen if the steroid is placed too close to your skin  A tendon near the injection site may rupture or a nerve can be damaged  Contact your healthcare provider if:   You have fever or chills  You have redness or swelling in the injection site  You have more pain than usual in your joint for more than 72 hours  You have questions or concerns about your condition or care  Medicines:   Pain medicine  may be given  Ask how to take this medicine safely  Take your medicine as directed  Contact your healthcare provider if you think your medicine is not helping or if you have side effects  Tell him or her if you are allergic to any medicine  Keep a list of the medicines, vitamins, and herbs you take  Include the amounts, and when and why you take them  Bring the list or the pill bottles to follow-up visits  Carry your medicine list with you in case of an emergency  Self-care:   Leave the bandage on for 8 to 12 hours  Care for your wound as directed  Rest the area  as directed  You may need to decrease weight on certain joints, such as the knee, for a period of time  Ask when you can return to your daily activities  Elevate  your limb where the steroid injection was given  Elevate the limb above the level of your heart as often as you can  This will help decrease swelling and pain  Prop your limb on pillows or blankets to keep it elevated comfortably  Apply ice  on your joint for 15 to 20 minutes every hour or as directed  Use an ice pack, or put crushed ice in a plastic bag  Cover it with a towel   Ice helps prevent tissue damage and decreases swelling and pain  Follow up with your healthcare provider as directed:  Write down your questions so you remember to ask them during your visits  © 2017 [employer] Information is for End User's use only and may not be sold, redistributed or otherwise used for commercial purposes  All illustrations and images included in CareNotes® are the copyrighted property of [employer]  or [employer]  The above information is an  only  It is not intended as medical advice for individual conditions or treatments  Talk to your doctor, nurse or pharmacist before following any medical regimen to see if it is safe and effective for you  Arthritis   AMBULATORY CARE:   Arthritis  is a disease that causes inflammation in one or more joints  There are many types of arthritis, such as osteoarthritis, rheumatoid arthritis, and septic arthritis  Some types cause inflammation in the joints  Other types wear away the cartilage between joints  This makes the bones of the joint rub together when you move the joint  Your symptoms may be constant, or symptoms may come and go  Arthritis often gets worse over time and can cause permanent joint damage  Common signs and symptoms of arthritis:   Pain, swelling, or stiffness in the joint    Limited range of motion in the joint    Warmth or redness over the joint    Tenderness when you touch the joint    Stiff joints in the morning that loosen with movement    A creaking or grinding sound when you move the joint    Fever  Seek care immediately if:   You have a fever and severe joint pain or swelling  You cannot move the affected joint  You have severe joint pain you cannot tolerate  Contact your healthcare provider if:   Your pain or swelling does not get better with treatment  You have questions or concerns about your condition or care  Treatment  will depend on the type of arthritis you have and if it is severe   You may need any of the following:  Acetaminophen  decreases pain and fever  It is available without a doctor's order  Ask how much to take and how often to take it  Follow directions  Acetaminophen can cause liver damage if not taken correctly  NSAIDs , such as ibuprofen, help decrease swelling, pain, and fever  This medicine is available with or without a doctor's order  NSAIDs can cause stomach bleeding or kidney problems in certain people  If you take blood thinner medicine, always ask your healthcare provider if NSAIDs are safe for you  Always read the medicine label and follow directions  Steroid medicine  helps reduce swelling and pain  Surgery  may be needed to repair or replace a damaged joint  Manage arthritis:   Rest your painful joint so it can heal   Your healthcare provider may recommend crutches or a walker if the affected joint is in a leg  Apply ice or heat to the joint  Both can help decrease swelling and pain  Ice may also help prevent tissue damage  Use an ice pack, or put crushed ice in a plastic bag  Cover it with a towel and place it on your joint for 15 to 20 minutes every hour or as directed  You can apply heat for 20 minutes every 2 hours  Heat treatment includes hot packs or heat lamps  Elevate your joint  Elevation helps reduce swelling and pain  Raise your joint above the level of your heart as often as you can  Prop your painful joint on pillows to keep it above your heart comfortably  Go to physical or occupational therapy as directed  A physical therapist can teach you exercises to improve flexibility and range of motion  You may also be shown non-weight-bearing exercises that are safe for your joints, such as swimming  Exercise can help keep your joints flexible and reduce pain  An occupational therapist can help you learn to do your daily activities when your joints are stiff or sore  Maintain a healthy weight  Extra weight puts increased pressure on your joints  Ask your healthcare provider what you should weigh  If you need to lose weight, he can help you create a weight loss program  Weight loss can help reduce pain and increase your ability to do your activities  The amount of exercise you do may vary each day, depending on your symptoms  Wear flat or low-heeled shoes  This will help decrease pain and reduce pressure on your ankle, knee, and hip joints  Use support devices as directed  You may be given splints to wear on your hands to help your joints rest and to decrease inflammation  While you sleep, use a pillow that is firm enough to support your neck and head  Other equipment  that may help you move and prevent falls:  Orthotic shoes or insoles  help support your feet when you walk  Crutches, a cane, or a walker  may help decrease your risk for falling  They also decrease stress on affected joints  Devices to prevent falls  include raised toilet seats and bathtub bars to help you get up from sitting  Handrails can be placed in areas where you need balance and support  Follow up with your healthcare provider or rheumatologist as directed:  Write down your questions so you remember to ask them during your visits  © 2017 [employer] Information is for End User's use only and may not be sold, redistributed or otherwise used for commercial purposes  All illustrations and images included in CareNotes® are the copyrighted property of [employer]  or [employer]  The above information is an  only  It is not intended as medical advice for individual conditions or treatments  Talk to your doctor, nurse or pharmacist before following any medical regimen to see if it is safe and effective for you

## 2022-09-13 ENCOUNTER — TELEPHONE (OUTPATIENT)
Dept: OBGYN CLINIC | Facility: HOSPITAL | Age: 57
End: 2022-09-13

## 2022-09-13 DIAGNOSIS — M25.562 ACUTE PAIN OF LEFT KNEE: Primary | ICD-10-CM

## 2022-09-13 DIAGNOSIS — M17.12 PRIMARY OSTEOARTHRITIS OF LEFT KNEE: ICD-10-CM

## 2022-09-13 NOTE — TELEPHONE ENCOUNTER
Patient sees Dr Evan Jefferson        Patient stated she was advised if her left knee wasn't any better to give the office a call and an Mri would be ordered   Patient is request to have the MRI done          CB: [phone]

## 2022-09-15 ENCOUNTER — TELEPHONE (OUTPATIENT)
Dept: OBGYN CLINIC | Facility: HOSPITAL | Age: 57
End: 2022-09-15

## 2022-09-15 NOTE — TELEPHONE ENCOUNTER
Patient calling to state CSI injection has not helped her knee, and has scheduled MRI  She would also like to know if there is anything else she can do for the pain   Please advise    CB # [phone]

## 2022-09-15 NOTE — TELEPHONE ENCOUNTER
Spoke to patient and she states she is taking the ibuprofen 600mg every 6 hrs alternating with tylenol 1000mg TID without relief  Advised ice 20 on 20 off  Advised steroid can take up to 2 weeks to help with pain and inflammation  MRI scheduled 9/24  Please advise if there is anything else should should be doing

## 2022-09-24 ENCOUNTER — HOSPITAL ENCOUNTER (OUTPATIENT)
Dept: RADIOLOGY | Facility: HOSPITAL | Age: 57
Discharge: HOME | End: 2022-09-24
Attending: EMERGENCY MEDICINE
Payer: COMMERCIAL

## 2022-09-24 DIAGNOSIS — M17.12 PRIMARY OSTEOARTHRITIS OF LEFT KNEE: ICD-10-CM

## 2022-09-24 DIAGNOSIS — M25.562 ACUTE PAIN OF LEFT KNEE: ICD-10-CM

## 2022-09-24 PROCEDURE — G1004 CDSM NDSC: HCPCS

## 2022-09-24 PROCEDURE — 73721 MRI JNT OF LWR EXTRE W/O DYE: CPT

## 2022-09-26 DIAGNOSIS — M84.469D INSUFFICIENCY FRACTURE OF TIBIA WITH ROUTINE HEALING, SUBSEQUENT ENCOUNTER: ICD-10-CM

## 2022-09-26 DIAGNOSIS — M25.562 ACUTE PAIN OF LEFT KNEE: Primary | ICD-10-CM

## 2022-09-26 DIAGNOSIS — M17.12 PRIMARY OSTEOARTHRITIS OF LEFT KNEE: ICD-10-CM

## 2022-09-26 RX ORDER — HYDROCODONE BITARTRATE AND ACETAMINOPHEN 5; 325 MG/1; MG/1
1 TABLET ORAL
Qty: 12 TABLET | Refills: 0 | Status: SHIPPED | OUTPATIENT
Start: 2022-09-26

## 2022-09-28 ENCOUNTER — TELEPHONE (OUTPATIENT)
Dept: OBGYN CLINIC | Facility: HOSPITAL | Age: 57
End: 2022-09-28

## 2022-09-28 NOTE — TELEPHONE ENCOUNTER
Patient called her work is requesting a date on her work she  She is stating if 10/21/22 can be added that would make it 3 weeks      # [phone]

## 2022-09-29 ENCOUNTER — TELEPHONE (OUTPATIENT)
Dept: OBGYN CLINIC | Facility: MEDICAL CENTER | Age: 57
End: 2022-09-29

## 2022-09-29 NOTE — TELEPHONE ENCOUNTER
Called pt and notified her that Dr Marlena Gutierrez completed her work note  Pt verbalized understanding

## 2022-09-30 ENCOUNTER — TELEPHONE (OUTPATIENT)
Dept: OBGYN CLINIC | Facility: MEDICAL CENTER | Age: 57
End: 2022-09-30

## 2022-09-30 NOTE — TELEPHONE ENCOUNTER
Patient stopped in to drop off [employer], I advised her it can take 10-14 business days to complete  She included a note that she is not requesting disability, just needs these filled out so her job can approve her to continue working from home

## 2022-10-18 ENCOUNTER — OFFICE VISIT (OUTPATIENT)
Dept: OBGYN CLINIC | Facility: MEDICAL CENTER | Age: 57
End: 2022-10-18
Payer: COMMERCIAL

## 2022-10-18 VITALS
HEART RATE: 83 BPM | BODY MASS INDEX: 33.46 KG/M2 | HEIGHT: 64 IN | WEIGHT: 196 LBS | SYSTOLIC BLOOD PRESSURE: 138 MMHG | DIASTOLIC BLOOD PRESSURE: 84 MMHG

## 2022-10-18 DIAGNOSIS — M84.469D INSUFFICIENCY FRACTURE OF TIBIA WITH ROUTINE HEALING, SUBSEQUENT ENCOUNTER: ICD-10-CM

## 2022-10-18 DIAGNOSIS — M25.562 ACUTE PAIN OF LEFT KNEE: Primary | ICD-10-CM

## 2022-10-18 DIAGNOSIS — S83.242D TEAR OF MEDIAL MENISCUS OF LEFT KNEE, CURRENT, UNSPECIFIED TEAR TYPE, SUBSEQUENT ENCOUNTER: ICD-10-CM

## 2022-10-18 DIAGNOSIS — M17.12 PRIMARY OSTEOARTHRITIS OF LEFT KNEE: ICD-10-CM

## 2022-10-18 PROCEDURE — 99214 OFFICE O/P EST MOD 30 MIN: CPT | Performed by: EMERGENCY MEDICINE

## 2022-10-18 RX ORDER — MELOXICAM 15 MG/1
15 TABLET ORAL DAILY
Qty: 30 TABLET | Refills: 1 | Status: SHIPPED | OUTPATIENT
Start: 2022-10-18

## 2022-10-18 NOTE — LETTER
October 18, 2022     Patient: Dayo Boudreaux  YOB: 1965  Date of Visit: 10/18/2022      To Whom it May Concern:    Maya Mickysam is under my professional care  Nic Shaikh was seen in my office on 10/18/2022  Work excuse until next appointment in 5 weeks on 11/22/22  If you have any questions or concerns, please don't hesitate to call           Sincerely,          [employer], MD        CC: No Recipients

## 2022-10-18 NOTE — PATIENT INSTRUCTIONS
NWB ROM and strengthening  Progress to WB strengthening as tolerated based on pain in 2 weeks  While taking meloxicam do not take any other NSAIDs such as Advil, Motrin, ibuprofen, Celebrex, naproxen or Aleve  However you may take Tylenol    You may also take Tylenol 500mg every 4-6 hours as needed OR max 1,000mg per dose up to 3 times per day for a total of 3,000mg per day

## 2022-10-18 NOTE — PROGRESS NOTES
Assessment/Plan:    Diagnoses and all orders for this visit:    Acute pain of left knee  -     Medial  Knee Brace  -     meloxicam (Mobic) 15 mg tablet; Take 1 tablet (15 mg total) by mouth daily  -     Ambulatory Referral to Physical Therapy; Future    Primary osteoarthritis of left knee  -     Medial  Knee Brace  -     meloxicam (Mobic) 15 mg tablet; Take 1 tablet (15 mg total) by mouth daily  -     Ambulatory Referral to Physical Therapy; Future    Insufficiency fracture of tibia with routine healing, subsequent encounter  -     Medial  Knee Brace  -     meloxicam (Mobic) 15 mg tablet; Take 1 tablet (15 mg total) by mouth daily  -     Ambulatory Referral to Physical Therapy; Future    Tear of medial meniscus of left knee, current, unspecified tear type, subsequent encounter  -     Medial  Knee Brace  -     meloxicam (Mobic) 15 mg tablet; Take 1 tablet (15 mg total) by mouth daily  -     Ambulatory Referral to Physical Therapy; Future    Symptoms likely arising from insufficiency fracture and or osteoarthritis seen on MRI  She had no benefit from steroid injection  At this time she may continue protected weight-bearing with crutches  As pain improves she may wean from the crutches as tolerated  Medial meniscus tear seen on MRI likely is asymptomatic there are no mechanical symptoms  However if symptoms continue t/c Ortho consult    PT: NWB ROM and strengthening  Progress to WB strengthening as tolerated based on pain in 2 weeks  Return in about 5 weeks (around 11/22/2022)  Chief Complaint:     Chief Complaint   Patient presents with   • Left Knee - Follow-up       Subjective:   Patient ID: Mary Briseno is a 61 y o  female  Patient returns for left knee pain reviewed MRI over telephone  She is s/p CSI  She notes some improvement of her pain symptoms since picking up her crutches, with burning ache of the anterior medial knee, denies mechanical symptoms  Initial note:  Patient new to me presents for 1 month of left posterior knee pain which she states began one mornign upon waking up, denies injury or any activities out of the ordinary  Denies SOB, no risk factors for DVT  Review of Systems    The following portions of the patient's chart were reviewed and updated as appropriate: Allergy:  No Known Allergies      Past Medical History:   Diagnosis Date   • Hyperlipidemia    • Migraine    • Seasonal allergies        Past Surgical History:   Procedure Laterality Date   • HYSTERECTOMY      partial   • FL COLONOSCOPY FLX DX W/COLLJ SPEC WHEN PFRMD N/A 3/17/2017    Procedure: COLONOSCOPY;  Surgeon: Nicky Greene MD;  Location: [facility];   Service: Colorectal       Social History     Socioeconomic History   • Marital status: Single     Spouse name: Not on file   • Number of children: Not on file   • Years of education: Not on file   • Highest education level: Not on file   Occupational History   • Not on file   Tobacco Use   • Smoking status: Never Smoker   • Smokeless tobacco: Never Used   Vaping Use   • Vaping Use: Never used   Substance and Sexual Activity   • Alcohol use: No     Comment: Social drinker per [employer]   • Drug use: No   • Sexual activity: Not on file   Other Topics Concern   • Not on file   Social History Narrative    Uses safety equipment-seat belts     Social Determinants of Health     Financial Resource Strain: Not on file   Food Insecurity: Not on file   Transportation Needs: Not on file   Physical Activity: Not on file   Stress: Not on file   Social Connections: Not on file   Intimate Partner Violence: Not on file   Housing Stability: Not on file       Family History   Problem Relation Age of Onset   • Other Mother         cardiac disorder   • Stroke Mother         CVA   • Diabetes Mother    • Diabetes Sister    • Hypertension Sister    • Hypertension Brother    • No Known Problems Father    • No Known Problems Maternal Grandmother • No Known Problems Maternal Grandfather    • No Known Problems Paternal Grandmother    • No Known Problems Paternal Grandfather        Medications:    Current Outpatient Medications:   •  albuterol (PROVENTIL HFA,VENTOLIN HFA) 90 mcg/act inhaler, Inhale 2 puffs every 4 (four) hours as needed for wheezing or shortness of breath, Disp: 6 7 g, Rfl: 3  •  beclomethasone (Qvar RediHaler) 80 MCG/ACT inhaler, Inhale 2 puffs 2 (two) times a day Rinse mouth after use , Disp: 10 6 g, Rfl: 1  •  HYDROcodone-acetaminophen (Norco) 5-325 mg per tablet, Take 1 tablet by mouth daily at bedtime as needed for pain Max Daily Amount: 1 tablet, Disp: 12 tablet, Rfl: 0  •  lisinopril (ZESTRIL) 2 5 mg tablet, Take 1 tablet (2 5 mg total) by mouth daily, Disp: 90 tablet, Rfl: 1  •  meloxicam (Mobic) 15 mg tablet, Take 1 tablet (15 mg total) by mouth daily, Disp: 30 tablet, Rfl: 1  •  rosuvastatin (CRESTOR) 10 MG tablet, Take 1 tablet (10 mg total) by mouth daily, Disp: 90 tablet, Rfl: 1  •  SUMAtriptan (IMITREX) 100 mg tablet, Take 1 tablet (100 mg total) by mouth once as needed for migraine for up to 1 dose, Disp: 9 tablet, Rfl: 0  •  Trulicity 9 94 KU/3 5WB SOPN, INJECT 0 5 MILLILITERS UNDER THE SKIN ONE TIME PER WEEK, Disp: 2 mL, Rfl: 0    Patient Active Problem List   Diagnosis   • Common migraine without aura   • Type 2 diabetes mellitus without complication, without long-term current use of insulin (HCC)   • Hyperlipidemia   • Lipoma   • Class 2 severe obesity due to excess calories with serious comorbidity in adult [facility])   • Thyroid nodule   • Screening for breast cancer   • Bilateral thumb pain   • Allergic rhinitis   • Primary osteoarthritis of left knee       Objective:  /84   Pulse 83   Ht 5' 4" (1 626 m)   Wt 88 9 kg (196 lb)   BMI 33 64 kg/m²     Ortho Exam    Physical Exam      Neurologic Exam    Procedures    I have personally reviewed the written report of the pertinent studies       MRI Left knee  IMPRESSION:     Subchondral insufficiency fracture of the medial tibial plateau (series 4 image 15 and series 5 image 13 )     Full-thickness radial tear through the medial meniscus posterior meniscal root (series 4 image 14, and series 5 images 9-10 )      Tricompartmental osteoarthritis, moderate in the patellofemoral compartment, and mild elsewhere      There is only trace amount of fluid in Baker's cyst

## 2022-10-19 ENCOUNTER — TELEPHONE (OUTPATIENT)
Dept: OBGYN CLINIC | Facility: HOSPITAL | Age: 57
End: 2022-10-19

## 2022-10-19 NOTE — TELEPHONE ENCOUNTER
Caller: Maya Jolley    Doctor: Marlee Cuba    Reason for call: [employer] [facility] did not receive the entire fax of the forms  Can we please refax the forms? They need them before or by Friday      Call back#: [phone]

## 2022-10-20 ENCOUNTER — TELEPHONE (OUTPATIENT)
Dept: OBGYN CLINIC | Facility: CLINIC | Age: 57
End: 2022-10-20

## 2022-10-20 NOTE — TELEPHONE ENCOUNTER
Caller: Patient     Doctor: Damon Rios     Reason for call: Checking status, advised forms were refsxed     Call back#: [phone]

## 2022-10-20 NOTE — TELEPHONE ENCOUNTER
Caller: Self    Doctor: Nazanin Mckinney     Reason for call: Aristeo Frost from [facility] sent over forms that need to be completed along with a request for her office notes  These are needed for the patient to be able to get the knee  brace       Call back#: [phone]

## 2022-10-21 NOTE — TELEPHONE ENCOUNTER
Caller: lul    Doctor: Júnior Apodaca    Reason for call: inquiring if forms were received   Would like this to be completed and a cb if it cant be  Call back#: [phone]

## 2022-10-25 ENCOUNTER — TELEPHONE (OUTPATIENT)
Dept: OBGYN CLINIC | Facility: HOSPITAL | Age: 57
End: 2022-10-25

## 2022-10-25 NOTE — TELEPHONE ENCOUNTER
Caller: ZOHREH @ [facility]    Doctor: Jayne Larkin    Reason for call: Requesting office note from 10/18/22 so patient can get DME, Medial Knee Brace,  I was able to fax while on the phone with  [address] [place] [address]    Call back#: [phone] phone  Fax: [phone]

## 2022-11-10 ENCOUNTER — EVALUATION (OUTPATIENT)
Dept: PHYSICAL THERAPY | Facility: CLINIC | Age: 57
End: 2022-11-10

## 2022-11-10 DIAGNOSIS — M17.12 PRIMARY OSTEOARTHRITIS OF LEFT KNEE: ICD-10-CM

## 2022-11-10 DIAGNOSIS — S83.242D TEAR OF MEDIAL MENISCUS OF LEFT KNEE, CURRENT, UNSPECIFIED TEAR TYPE, SUBSEQUENT ENCOUNTER: ICD-10-CM

## 2022-11-10 DIAGNOSIS — M25.562 ACUTE PAIN OF LEFT KNEE: Primary | ICD-10-CM

## 2022-11-10 DIAGNOSIS — M84.469D INSUFFICIENCY FRACTURE OF TIBIA WITH ROUTINE HEALING, SUBSEQUENT ENCOUNTER: ICD-10-CM

## 2022-11-10 LAB
BASOPHILS # BLD AUTO: 43 CELLS/UL (ref 0–200)
BASOPHILS NFR BLD AUTO: 0.7 %
EOSINOPHIL # BLD AUTO: 140 CELLS/UL (ref 15–500)
EOSINOPHIL NFR BLD AUTO: 2.3 %
ERYTHROCYTE [DISTWIDTH] IN BLOOD BY AUTOMATED COUNT: 12.5 % (ref 11–15)
HCT VFR BLD AUTO: 41.8 % (ref 35–45)
HGB BLD-MCNC: 13.8 G/DL (ref 11.7–15.5)
LYMPHOCYTES # BLD AUTO: 1592 CELLS/UL (ref 850–3900)
LYMPHOCYTES NFR BLD AUTO: 26.1 %
MCH RBC QN AUTO: 29.6 PG (ref 27–33)
MCHC RBC AUTO-ENTMCNC: 33 G/DL (ref 32–36)
MCV RBC AUTO: 89.7 FL (ref 80–100)
MONOCYTES # BLD AUTO: 397 CELLS/UL (ref 200–950)
MONOCYTES NFR BLD AUTO: 6.5 %
NEUTROPHILS # BLD AUTO: 3928 CELLS/UL (ref 1500–7800)
NEUTROPHILS NFR BLD AUTO: 64.4 %
PLATELET # BLD AUTO: 286 THOUSAND/UL (ref 140–400)
PMV BLD REES-ECKER: 11.6 FL (ref 7.5–12.5)
RBC # BLD AUTO: 4.66 MILLION/UL (ref 3.8–5.1)
WBC # BLD AUTO: 6.1 THOUSAND/UL (ref 3.8–10.8)

## 2022-11-10 NOTE — PROGRESS NOTES
PT Evaluation     Today's date: 11/10/2022  Patient name: Lashae Holland  : 1965  MRN: [id number]  Referring provider: Chelita Ocasio MD  Dx:   Encounter Diagnosis     ICD-10-CM    1  Acute pain of left knee  M25 562 Ambulatory Referral to Physical Therapy   2  Primary osteoarthritis of left knee  M17 12 Ambulatory Referral to Physical Therapy   3  Insufficiency fracture of tibia with routine healing, subsequent encounter  M84 469D Ambulatory Referral to Physical Therapy   4  Tear of medial meniscus of left knee, current, unspecified tear type, subsequent encounter  S83 242D Ambulatory Referral to Physical Therapy                  Assessment  Assessment details: Lashae Holland is a 61 y o  female presenting to physical therapy for an initial evaluation with a MD referral of acute pain of left knee, primary osteoarthritis of left knee, insufficiency fracture of tibia with routine healing, tear of medial meniscus of left knee  The patient demonstrates decreased and painful left knee ROM with deficits in both flexion and extension, decreased lower extremity strength, and hamstring/calf muscle tightness  She displayed patellar hypomobility with pain reported  These deficits have limited the patient's ability to complete ADLs, vocational responsibilities, and recreational activities  This patient would benefit from OP PT services to address their impairments and functional limitations to maximize functional mobility  The patient was provided a HEP and demonstrated/verbalized understanding it's completion  She was educated at length regarding healing processes, inflammatory process, as well as anatomy/physiology of imaging findings  She verbalized understanding the above education and denied questions at end of session       Impairments: abnormal or restricted ROM, activity intolerance, impaired balance, impaired physical strength, lacks appropriate home exercise program, pain with function and weight-bearing intolerance    Symptom irritability: moderateUnderstanding of Dx/Px/POC: excellent   Prognosis: good    Goals  STG to be achieved in 4 weeks: The patient will report a decrease in left knee "at worst" subjective pain rating score to at least 5/10 to allow for improved tolerance for weightbearing activities  The patient will increase left knee flexion AROM to at least 120 degrees to allow for improved functional mobility  The patient will increase left knee extension MMT score to at least 4+/5 to allow for improved functional mobility  LTG to be achieved by DC: The patient will be independent in comprehensive HEP  The patient will report no pain with usual activities  The patient will improve left knee AROM to [facility] to allow for improved functional mobility  The patient will increase left knee MMT scores to [facility] to allow for improved functional mobility  The patient will be able to walk a mile without pain or difficulty  The patient will be able to shop in the grocery store without reliance on grocery cart without pain or difficulty  The patient will be able to squat down to get objects out of low kitchen cabinets without pain or difficulty  The patient will be able to return to work without restrictions or limitations  The patient will be able to ascend and descend stairs reciprocally without pain or difficulty         Plan  Patient would benefit from: skilled physical therapy  Planned modality interventions: thermotherapy: hydrocollator packs, TENS and cryotherapy  Planned therapy interventions: manual therapy, joint mobilization, balance/weight bearing training, neuromuscular re-education, patient education, flexibility, strengthening, stretching, therapeutic activities, therapeutic exercise, gait training and home exercise program  Frequency: 2x week  Duration in weeks: 10  Plan of Care beginning date: 11/10/2022  Plan of Care expiration date: 1/19/2023  Treatment plan discussed with: patient        Subjective Evaluation    History of Present Illness  Mechanism of injury: Viviane Kee presents to therapy with a chief complaint of left knee pain ongoing for approximately 4 months  She reports that she woke up one morning with the pain in the posterior aspect of her knee  She states that she had a fall off a ladder a calendar year ago, however no other incidents have occurred recently  She states that when the pain didn't go away, she contacted orthopedics for consult  An MRI was performed on 9/24/22 showed "Subchondral insufficiency fracture of the medial tibial plateau  Full-thickness radial tear through the medial meniscus posterior meniscal root  Tricompartmental osteoarthritis, moderate in the patellofemoral compartment, and mild elsewhere " Per ortho visit on 10/18/22: "she may continue protected weight-bearing with crutches  As pain improves she may wean from the crutches as tolerated  Medial meniscus tear seen on MRI likely is asymptomatic there are no mechanical symptoms  However if symptoms continue t/c Ortho consult  PT: NWB ROM and strengthening  Progress to WB strengthening as tolerated based on pain in 2 weeks"  She reports that over the past 2 weeks or so, she has been weaning away from use of the crutches and towards using the knee brace that she received from them  She feels that with this change in her mobility, she has noticed some increased pain and swelling in the knee which makes her feel like she needs to go back to using the crutches  She reports that at this time, she has pain constantly  She states that even when sitting she has discomfort in the anterior shin area and describes it as soreness  She reports that her pain is increased with weightbearing, especially when walking up/down her driveway which has an incline to it as well as steps   She notes that when ascending stairs she has to complete them non-reciprocally with the right leg first and then descends the stairs sideways leading with her right leg  She reports that she has difficulty squatting and also getting down onto her hands and knees in order to reach objects in low cabinets in her kitchen  She states that she isn't able to walk long distances and has a hard time just walking through the grocery store with use of the cart  She states that she works from home per a note from her orthopedic and 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  Pain  Current pain ratin  At best pain ratin  At worst pain ratin  Location: L anterior and posterior knee as well as shin   Quality: burning, dull ache and tight (spasms, soreness)  Relieving factors: change in position, ice and heat  Aggravating factors: standing, stair climbing, walking and sitting  Progression: no change    Social Support  Steps to enter house: yes  Stairs in house: yes   Lives in: multiple-level home  Lives with: alone    Employment status: working ( for [address] [place] [address] [employer] )    Diagnostic Tests  MRI studies: abnormal  Treatments  Previous treatment: medication and immobilization  Current treatment: physical therapy  Patient Goals  Patient goals for therapy: decreased pain, increased motion, increased strength, independence with ADLs/IADLs and return to work  Patient goal: for my knee not to hurt         Objective     Palpation   Left   No palpable tenderness to the distal biceps femoris, distal semimembranosus, distal semitendinosus and lateral gastrocnemius  Tenderness of the medial gastrocnemius  Tenderness   Left Knee   Tenderness in the lateral joint line, medial joint line and popliteal fossa  No tenderness in the LCL (distal), LCL (proximal), MCL (distal), MCL (proximal), patellar tendon and quadriceps tendon       Additional Tenderness Details  TTP at L anterior tibial plateau and distal to mid shaft     Active Range of Motion   Left Knee   Flexion: 108 degrees with pain  Extension: 0 degrees with pain    Right Knee Hyperextension   Flexion: 130 degrees   Extension: 4 degrees     Mobility   Patellar Mobility:   Left Knee   Hypomobile: left medial, left lateral, left superior and left inferior    Right Knee   WFL: medial, lateral, superior and inferior    Additional Mobility Details  Pain with L patellar mobility medially and laterally    Strength/Myotome Testing     Left Hip   Planes of Motion   Flexion: 4+  Extension: 4-  Abduction: 4-    Right Hip   Planes of Motion   Flexion: 5  Extension: 4-  Abduction: 4    Left Knee   Flexion: 4-  Extension: 4    Right Knee   Flexion: 5  Extension: 5             Precautions: DM  Per ortho orders: "NWB ROM and strengthening   Progress to WB strengthening as tolerated based on pain in 2 weeks"    Access Code: [email or web address]       Manuals 11/10            L knee PROM             L patellar mobility                                       Neuro Re-Ed             Quad set with towel  HEP             Supine SLR with quad set              [employer]                                                      Ther Ex             Nustep             Heel slides with strap             Hamstring stretch at step HEP supine            Prostretch  HEP long sitting            Matrix knee ext             Matrix hamstring curl             [employer]                                        Pt education PT POC, HEP, imaging, anatomy, physiology                         Ther Activity                                       Gait Training                                       Modalities

## 2022-11-14 ENCOUNTER — OFFICE VISIT (OUTPATIENT)
Dept: PHYSICAL THERAPY | Facility: CLINIC | Age: 57
End: 2022-11-14

## 2022-11-14 DIAGNOSIS — M17.12 PRIMARY OSTEOARTHRITIS OF LEFT KNEE: ICD-10-CM

## 2022-11-14 DIAGNOSIS — M25.562 ACUTE PAIN OF LEFT KNEE: Primary | ICD-10-CM

## 2022-11-14 DIAGNOSIS — S83.242D TEAR OF MEDIAL MENISCUS OF LEFT KNEE, CURRENT, UNSPECIFIED TEAR TYPE, SUBSEQUENT ENCOUNTER: ICD-10-CM

## 2022-11-14 DIAGNOSIS — M84.469D INSUFFICIENCY FRACTURE OF TIBIA WITH ROUTINE HEALING, SUBSEQUENT ENCOUNTER: ICD-10-CM

## 2022-11-14 NOTE — PROGRESS NOTES
Daily Note     Today's date: 2022  Patient name: Melinda Junior  : 1965  MRN: [id number]  Referring provider: Sue Hanks MD  Dx:   Encounter Diagnosis     ICD-10-CM    1  Acute pain of left knee  M25 562    2  Primary osteoarthritis of left knee  M17 12    3  Insufficiency fracture of tibia with routine healing, subsequent encounter  M84 659D    4  Tear of medial meniscus of left knee, current, unspecified tear type, subsequent encounter  S83 153D                   Subjective: Patient reports that she has been completing her HEP and notes that she "felt it" in the knee, however wasn't increased pain  She reports that she has been using her brace when walking and has been using her crutches on occasions when she is more mobile  Objective: See treatment diary below      Assessment: Tolerated treatment well  Focused on nonweightbearing strengthening exercises this session per ortho orders with patient noting muscular fatigue at end of session  Good quadriceps contraction noted and no quad lag evident  Denied increased pain and was educated on DOMS at end of session  Patient demonstrated fatigue post treatment, exhibited good technique with therapeutic exercises and would benefit from continued PT      Plan: Progress treatment as tolerated         Precautions: DM  Per ortho orders: "NWB ROM and strengthening   Progress to WB strengthening as tolerated based on pain in 2 weeks"    Access Code: [email or web address]       Manuals 11/10 11/14           L knee PROM             L patellar mobility  BE                                     Neuro Re-Ed             Quad set with towel  HEP  5" 2x10           Supine SLR with quad set   2x10           Bridges  5" 2x10           SAQ    5" 2x10                                                  Ther Ex             Nustep  L3 10'           Heel slides with strap             Hamstring stretch at step HEP supine Supine 3x30"           Prostretch  HEP long sitting Long sitting 3x30"           Matrix knee ext             Matrix hamstring curl             Julius                                        Pt education PT POC, HEP, imaging, anatomy, physiology                         Ther Activity                                       Gait Training                                       Modalities

## 2022-11-16 ENCOUNTER — HOSPITAL ENCOUNTER (OUTPATIENT)
Dept: RADIOLOGY | Facility: MEDICAL CENTER | Age: 57
Discharge: HOME | End: 2022-11-16

## 2022-11-16 VITALS — HEIGHT: 64 IN | WEIGHT: 196 LBS | BODY MASS INDEX: 33.46 KG/M2

## 2022-11-16 DIAGNOSIS — Z12.31 ENCOUNTER FOR SCREENING MAMMOGRAM FOR MALIGNANT NEOPLASM OF BREAST: ICD-10-CM

## 2022-11-17 ENCOUNTER — OFFICE VISIT (OUTPATIENT)
Dept: PHYSICAL THERAPY | Facility: CLINIC | Age: 57
End: 2022-11-17

## 2022-11-17 ENCOUNTER — OFFICE VISIT (OUTPATIENT)
Dept: FAMILY MEDICINE CLINIC | Facility: CLINIC | Age: 57
End: 2022-11-17

## 2022-11-17 VITALS
SYSTOLIC BLOOD PRESSURE: 120 MMHG | RESPIRATION RATE: 16 BRPM | OXYGEN SATURATION: 98 % | BODY MASS INDEX: 34.49 KG/M2 | DIASTOLIC BLOOD PRESSURE: 74 MMHG | HEART RATE: 74 BPM | TEMPERATURE: 96.4 F | HEIGHT: 64 IN | WEIGHT: 202 LBS

## 2022-11-17 DIAGNOSIS — G43.009 MIGRAINE WITHOUT AURA AND WITHOUT STATUS MIGRAINOSUS, NOT INTRACTABLE: ICD-10-CM

## 2022-11-17 DIAGNOSIS — E04.1 THYROID NODULE: ICD-10-CM

## 2022-11-17 DIAGNOSIS — S83.242D TEAR OF MEDIAL MENISCUS OF LEFT KNEE, CURRENT, UNSPECIFIED TEAR TYPE, SUBSEQUENT ENCOUNTER: ICD-10-CM

## 2022-11-17 DIAGNOSIS — E11.9 TYPE 2 DIABETES MELLITUS WITHOUT COMPLICATION, WITHOUT LONG-TERM CURRENT USE OF INSULIN (HCC): ICD-10-CM

## 2022-11-17 DIAGNOSIS — E11.9 TYPE 2 DIABETES MELLITUS WITHOUT COMPLICATION, WITHOUT LONG-TERM CURRENT USE OF INSULIN (HCC): Primary | ICD-10-CM

## 2022-11-17 DIAGNOSIS — R22.42 NODULE OF SKIN OF LEFT FOOT: ICD-10-CM

## 2022-11-17 DIAGNOSIS — E78.5 HYPERLIPIDEMIA, UNSPECIFIED HYPERLIPIDEMIA TYPE: ICD-10-CM

## 2022-11-17 DIAGNOSIS — M17.12 PRIMARY OSTEOARTHRITIS OF LEFT KNEE: ICD-10-CM

## 2022-11-17 DIAGNOSIS — E66.01 CLASS 2 SEVERE OBESITY DUE TO EXCESS CALORIES WITH SERIOUS COMORBIDITY AND BODY MASS INDEX (BMI) OF 35.0 TO 35.9 IN ADULT (HCC): ICD-10-CM

## 2022-11-17 DIAGNOSIS — M84.469D INSUFFICIENCY FRACTURE OF TIBIA WITH ROUTINE HEALING, SUBSEQUENT ENCOUNTER: ICD-10-CM

## 2022-11-17 DIAGNOSIS — M25.562 ACUTE PAIN OF LEFT KNEE: Primary | ICD-10-CM

## 2022-11-17 LAB — SL AMB POCT HEMOGLOBIN AIC: 6.5 (ref ?–6.5)

## 2022-11-17 RX ORDER — DULAGLUTIDE 0.75 MG/.5ML
0.75 INJECTION, SOLUTION SUBCUTANEOUS WEEKLY
Qty: 2 ML | Refills: 5 | Status: SHIPPED | OUTPATIENT
Start: 2022-11-17

## 2022-11-17 RX ORDER — SUMATRIPTAN 100 MG/1
100 TABLET, FILM COATED ORAL ONCE AS NEEDED
Qty: 9 TABLET | Refills: 0 | Status: SHIPPED | OUTPATIENT
Start: 2022-11-17

## 2022-11-17 RX ORDER — BLOOD-GLUCOSE METER
KIT MISCELLANEOUS
Qty: 1 KIT | Refills: 0 | Status: SHIPPED | OUTPATIENT
Start: 2022-11-17

## 2022-11-17 RX ORDER — BLOOD SUGAR DIAGNOSTIC
STRIP MISCELLANEOUS
Qty: 100 EACH | Refills: 3 | Status: SHIPPED | OUTPATIENT
Start: 2022-11-17

## 2022-11-17 RX ORDER — LANCETS 33 GAUGE
EACH MISCELLANEOUS
Qty: 100 EACH | Refills: 3 | Status: SHIPPED | OUTPATIENT
Start: 2022-11-17

## 2022-11-17 RX ORDER — ROSUVASTATIN CALCIUM 10 MG/1
10 TABLET, COATED ORAL DAILY
Qty: 90 TABLET | Refills: 1 | Status: SHIPPED | OUTPATIENT
Start: 2022-11-17 | End: 2022-11-18 | Stop reason: SDUPTHER

## 2022-11-17 RX ORDER — LISINOPRIL 2.5 MG/1
2.5 TABLET ORAL DAILY
Qty: 90 TABLET | Refills: 1 | Status: SHIPPED | OUTPATIENT
Start: 2022-11-17 | End: 2022-11-18 | Stop reason: SDUPTHER

## 2022-11-17 NOTE — PATIENT INSTRUCTIONS
Type 2 Diabetes Management for Adults   AMBULATORY CARE:   Type 2 diabetes  is a disease that affects how your body uses glucose (sugar)  Either your body cannot make enough insulin, or it cannot use the insulin correctly  It is important to keep diabetes controlled to prevent damage to your heart, blood vessels, and other organs  Have someone call your local emergency number (911 in the [address] [facility]) if:   · You cannot be woken  · You have signs of diabetic ketoacidosis:     ? confusion, fatigue    ? vomiting    ? rapid heartbeat    ? fruity smelling breath    ? extreme thirst    ? dry mouth and skin    · You have any of the following signs of a heart attack:      ? Squeezing, pressure, or pain in your chest    ? You may  also have any of the following:     § Discomfort or pain in your back, neck, jaw, stomach, or arm    § Shortness of breath    § Nausea or vomiting    § Lightheadedness or a sudden cold sweat    · You have any of the following signs of a stroke:      ? Numbness or drooping on one side of your face     ? Weakness in an arm or leg    ? Confusion or difficulty speaking    ? Dizziness, a severe headache, or vision loss    Call your doctor or diabetes care team if:   · You have a sore or wound that will not heal     · You have a change in the amount you urinate  · Your blood sugar levels are higher than your target goals  · You often have lower blood sugar levels than your target goals  · Your skin is red, dry, warm, or swollen  · You have trouble coping with diabetes, or you feel anxious or depressed  · You have questions or concerns about your condition or care  What you need to know about high blood sugar levels:  High blood sugar levels may not cause any symptoms  You may feel more thirsty or urinate more often than usual  Over time, high blood sugar levels can damage your nerves, blood vessels, tissues, and organs   The following can increase your blood sugar levels:  · Large meals or large amounts of carbohydrates at one time    · Less physical activity    · Stress    · Illness    · A lower dose of medicine or insulin, or a late dose    What you need to know about low blood sugar levels: You can prevent symptoms such as shakiness, dizziness, irritability, or confusion by preventing your blood sugar levels from going too low  · Treat a low blood sugar level right away  ? Drink 4 ounces of juice or have 1 tube of glucose gel  ? Check your blood sugar level again 10 to 15 minutes later  ? When the level goes back to normal, eat a meal or snack to prevent another decrease  · Keep glucose gel, raisins, or hard candy with you at all times to treat a low blood sugar level  · Your blood sugar level can get too low if you take diabetes medicine or insulin and do not eat enough food  · If you use insulin, check your blood sugar level before you exercise  ? If your blood sugar level is below 100 mg/dL, eat 4 crackers or 2 ounces of raisins, or drink 4 ounces of juice  ? Check your level every 30 minutes if you exercise more than 1 hour  ? You may need a snack during or after exercise  What you can do to manage your blood sugar levels:   · Check your blood sugar levels as directed and as needed  Several items are available to use to check your levels  You may need to check by testing a drop of blood in a glucose monitor  You may instead be given a continuous glucose monitoring (CGM) device  The device is worn at all times  The CGM checks your blood sugar level every 5 minutes  It sends results to an electronic device such as a smart phone  A CGM can be used with or without an insulin pump  Talk with your provider to find out which method is best for you  The goal for blood sugar levels before meals  is between 80 and 130 mg/dL and 2 hours after eating  is lower than 180 mg/dL  · Make healthy food choices    Work with a dietitian to develop a meal plan that works for you and your schedule  A dietitian can help you learn how to eat the right amount of carbohydrates during your meals and snacks  Carbohydrates can raise your blood sugar level if you eat too many at one time  Examples of foods that contain carbohydrates are breads, cereals, rice, pasta, and sweets  · Get regular physical activity  Physical activity can help you get to your target blood sugar level goal and manage your weight  Get at least 150 minutes of moderate to vigorous aerobic physical activity each week  Do not miss more than 2 days in a row  Do not sit longer than 30 minutes at a time  Your healthcare provider can help you create an activity plan  The plan can include the best activities for you and can help you build your strength and endurance  · Maintain a healthy weight  Ask your healthcare provider what a healthy weight is for you  Ask him or her to help you create a safe weight loss plan if you are overweight  · Take your diabetes medicine or insulin as directed  You may need diabetes medicine, insulin, or both to help control your blood sugar levels  Your healthcare provider will teach you how and when to take your diabetes medicine or insulin  You will also be taught about side effects oral diabetes medicine can cause  Insulin may be injected, or given through a pump or pen  You and your care team will discuss which method is best for you  ? An insulin pump  is an implanted device that gives your insulin 24 hours a day  An insulin pump prevents the need for multiple insulin injections in a day  ? An insulin pen  is a device prefilled with the right amount of insulin  ? You and your family members will be taught how to draw up and give insulin  if this is the best method for you  Your education team will also teach you how to dispose of needles and syringes  ? You will learn how much insulin you need  and when to give it   You will be taught when not to give insulin  You will also be taught what to do if your blood sugar level drops too low  This may happen if you take insulin and do not eat the right amount of carbohydrates  Other things you can do to manage type 2 diabetes:   · Wear medical alert identification  Wear medical alert jewelry or carry a card that says you have diabetes  Ask your provider where to get these items  · Do not smoke  Nicotine and other chemicals in cigarettes and cigars can cause lung and blood vessel damage  It also makes it more difficult to manage your diabetes  Ask your provider for information if you currently smoke and need help to quit  Do not use e-cigarettes or smokeless tobacco in place of cigarettes or to help you quit  They still contain nicotine  · Check your feet each day for cuts, scratches, calluses, or other wounds  Look for redness and swelling, and feel for warmth  Wear shoes that fit well  Check your shoes for rocks or other objects that can hurt your feet  Do not walk barefoot or wear shoes without socks  Wear cotton socks to help keep your feet dry  · Ask about vaccines you may need  You have a higher risk for serious illness if you get the flu, pneumonia, COVID-19, or hepatitis  Ask your provider if you should get vaccines to prevent these or other diseases, and when to get the vaccines  · Talk to your care team if you become stressed about diabetes care  Sometimes being able to fit diabetes care into your life can cause increased stress  The stress can cause you not to take care of yourself properly  Your care team can help by offering tips about self-care  Your care team may suggest you talk to a mental health provider  The provider can listen and offer help with self-care issues  Follow up with your doctor or diabetes care team as directed: You may need to have blood tests done before your follow-up visit   The test results will show if changes need to be made in your treatment or self-care  Write down your questions so you remember to ask them during your visits  Talk to your provider if you cannot afford your medicine  © Copyright [employer] 2022 Information is for End User's use only and may not be sold, redistributed or otherwise used for commercial purposes  All illustrations and images included in CareNotes® are the copyrighted property of [employer]  or [employer]   The above information is an  only  It is not intended as medical advice for individual conditions or treatments  Talk to your doctor, nurse or pharmacist before following any medical regimen to see if it is safe and effective for you  Basic Carbohydrate Counting   AMBULATORY CARE:   Carbohydrate counting  is a way to plan your meals by counting the amount of carbohydrate in foods  Carbohydrates are the sugars, starches, and fiber found in fruit, grains, vegetables, and milk products  Carbohydrates increase your blood sugar levels  Carbohydrate counting can help you eat the right amount of carbohydrate to keep your blood sugar levels under control  What you need to know about planning meals using carbohydrate counting:  · A dietitian or healthcare provider will help you develop a healthy meal plan that works best for you  You will be taught how much carbohydrate to eat or drink for each meal and snack  Your meal plan will be based on your age, weight, usual food intake, and physical activity level  If you have diabetes, it will also include your blood sugar levels and diabetes medicine  Once you know how much carbohydrate you should eat, you can decide what type of food you want to eat  · You will need to know what foods contain carbohydrate and how much they contain  Keep track of the amount of carbohydrate in meals and snacks in order to follow your meal plan  Do not avoid carbohydrates or skip meals   Your blood sugar may fall too low if you do not eat enough carbohydrate or you skip meals  Foods that contain carbohydrate:   · Breads:  Each serving of food listed below contains about 15 g of carbohydrate   ? 1 slice of bread (1 ounce) or 1 flour or corn tortilla (6 inch)    ? ½ of a hamburger bun or ¼ of a large bagel (about 1 ounce)    ? 1 pancake (about 4 inches across and ¼ inch thick)    · Cereals and grains:  Serving sizes of ready-to-eat cereals vary  Look at the serving size and the total carbohydrate amount listed on the food label  Each serving of food listed below contains about 15 g of carbohydrate   ? ¾ cup of dry, unsweetened, ready-to-eat cereal or ¼ cup of low-fat granola     ? ½ cup of oatmeal or other cooked cereal     ? ? cup of cooked rice or pasta    · Starchy vegetables and beans:  Each serving of food listed below contains about 15 g of carbohydrate   ? ½ cup of corn, green peas, sweet potatoes, or mashed potatoes    ? ¼ of a large baked potato    ? ½ cup of beans, lentils, and peas (garbanzo, hayward, kidney, white, split, black-eyed)    · Crackers and snacks:  Each serving of food listed below contains about 15 g of carbohydrate   ? 3 tracy cracker squares or 8 animal crackers     ? 6 saltine-type crackers    ? 3 cups of popcorn or ¾ ounce of pretzels, potato chips, or tortilla chips    · Fruit:  Each serving of food listed below contains about 15 g of carbohydrate   ? 1 small (4 ounce) piece of fresh fruit or ¾ to 1 cup of fresh fruit    ? ½ cup of canned or frozen fruit, packed in natural juice    ? ½ cup (4 ounces) of unsweetened fruit juice    ? 2 tablespoons of dried fruit    · Desserts or sugary foods:  Each serving of food listed below contains about 15 g of carbohydrate   ? 2-inch square unfrosted cake or brownie     ? 2 small cookies    ? ½ cup of ice cream, frozen yogurt, or nondairy frozen yogurt    ? ¼ cup of sherbet or sorbet    ? 1 tablespoon of regular syrup, jam, or jelly    ?  2 tablespoons of light syrup    · Milk and yogurt:  Foods from the milk group contain about 12 g of carbohydrate per serving  ? 1 cup of fat-free or low-fat milk    ? 1 cup of soy milk    ? ? cup of fat-free, yogurt sweetened with artificial sweetener    · Non-starchy vegetables:  Each serving contains about 5 g of carbohydrate   Three servings of non-starch vegetables count as 1 carbohydrate serving  ? ½ cup of cooked vegetables or 1 cup of raw vegetables  This includes beets, broccoli, cabbage, cauliflower, cucumber, mushrooms, tomatoes, and zucchini    ? ½ cup of vegetable juice    How to use carbohydrate counting to plan meals:   · Count carbohydrate amounts using serving sizes:      ? Pasta dinner example: You plan to have pasta, tossed salad, and an 8-ounce glass of milk  Your healthcare provider tells you that you may have 4 carbohydrate servings for dinner  One carbohydrate serving of pasta is ? cup  One cup of pasta will equal 3 carbohydrate servings  An 8-ounce glass of milk will count as 1 carbohydrate serving  These amounts of food would equal 4 carbohydrate servings  One cup of tossed salad does not count toward your carbohydrate servings  · Count carbohydrate amounts using food labels:  Find the total amount of carbohydrate in a packaged food by reading the food label  Food labels tell you the serving size of the food and the total carbohydrate amount in each serving  Find the serving size on the food label and then decide how many servings you will eat  Multiply the number of servings you plan to eat by the carbohydrate amount per serving  ? Granola bar snack example: Your meal plan allows you to have 2 carbohydrate servings (30 grams) of carbohydrate for a snack  You plan to eat 1 package of granola bars, which contains 2 bars  According to the food label, the serving size of food in this package is 1 bar  Each serving (1 bar) contains 25 grams of carbohydrate   The total amount of carbohydrate in this package of granola bars would be 50 g  Based on your meal plan, you should eat only 1 bar  Follow up with your doctor as directed:  Write down your questions so you remember to ask them during your visits  © Copyright [employer] 2022 Information is for End User's use only and may not be sold, redistributed or otherwise used for commercial purposes  All illustrations and images included in CareNotes® are the copyrighted property of [employer] Inc  or [employer]  The above information is an  only  It is not intended as medical advice for individual conditions or treatments  Talk to your doctor, nurse or pharmacist before following any medical regimen to see if it is safe and effective for you  Foot Care for People with Diabetes   AMBULATORY CARE:   What you need to know about foot care:   · Foot care helps protect your feet and prevent foot ulcers or sores  Long-term high blood sugar levels can damage the blood vessels and nerves in your legs and feet  This damage makes it hard to feel pressure, pain, temperature, and touch  You may not be able to feel a cut or sore, or shoes that are too tight  Foot care is needed to prevent serious problems, such as an infection or amputation  · Diabetes may cause your toes to become crooked or curved under  These changes may affect the way you walk and can lead to increased pressure on your foot  The pressure can decrease blood flow to your feet  Lack of blood flow increases your risk for a foot ulcer  Do not ignore small problems, such as dry skin or small wounds  These can become life-threatening over time without proper care  Call your care team provider if:   · Your feet become numb, weak, or hard to move  · You have pus draining from a sore on your foot  · You have a wound on your foot that gets bigger, deeper, or does not heal      · You see blisters, cuts, scratches, calluses, or sores on your foot       · You have a fever, and your feet become red, warm, and swollen  · Your toenails become thick, curled, or yellow  · You find it hard to check your feet because your vision is poor  · You have questions or concerns about your condition or care  How to care for your feet:   · Check your feet each day  Look at your whole foot, including the bottom, and between and under your toes  Check for wounds, corns, and calluses  Use a mirror to see the bottom of your feet  The skin on your feet may be shiny, tight, or darker than normal  Your feet may also be cold and pale  Feel your feet by running your hands along the tops, bottoms, sides, and between your toes  Redness, swelling, and warmth are signs of blood flow problems that can lead to a foot ulcer  Do not try to remove corns or calluses yourself  · Wash your feet each day with soap and warm water  Do not use hot water, because this can injure your foot  Dry your feet gently with a towel after you wash them  Dry between and under your toes  · Apply lotion or a moisturizer on your dry feet  Ask your care team provider what lotions are best to use  Do not put lotion or moisturizer between your toes  Moisture between your toes could lead to skin breakdown  · Cut your toenails correctly  File or cut your toenails straight across  Use a soft brush to clean around your toenails  If your toenails are very thick, you may need to have a care team provider or specialist cut them  · Protect your feet  Do not walk barefoot or wear your shoes without socks  Check your shoes for rocks or other objects that can hurt your feet  Wear cotton socks to help keep your feet dry  Wear socks without toe seams, or wear them with the seams inside out  Change your socks each day  Do not wear socks that are dirty or damp  · Wear shoes that fit well  Wear shoes that do not rub against any area of your feet   Your shoes should be ½ to ¾ inch (1 to 2 centimeters) longer than your feet  Your shoes should also have extra space around the widest part of your feet  Walking or athletic shoes with laces or straps that adjust are best  Ask your care team provider for help to choose shoes that fit you best  Ask him or her if you need to wear an insert, orthotic, or bandage on your feet  · Go to your follow-up visits  Your care team provider will do a foot exam at least once a year  You may need a foot exam more often if you have nerve damage, foot deformities, or ulcers  He will check for nerve damage and how well you can feel your feet  He will check your shoes to see if they fit well  · Do not smoke  Smoking can damage your blood vessels and put you at increased risk for foot ulcers  Ask your care team provider for information if you currently smoke and need help to quit  E-cigarettes or smokeless tobacco still contain nicotine  Talk to your care team provider before you use these products  Follow up with your diabetes care team provider or foot specialist as directed: You will need to have your feet checked at least once a year  You may need a foot exam more often if you have nerve damage, foot deformities, or ulcers  Write down your questions so you remember to ask them during your visits  © Copyright [employer] 2022 Information is for End User's use only and may not be sold, redistributed or otherwise used for commercial purposes  All illustrations and images included in CareNotes® are the copyrighted property of [employer]  or [employer]  The above information is an  only  It is not intended as medical advice for individual conditions or treatments  Talk to your doctor, nurse or pharmacist before following any medical regimen to see if it is safe and effective for you

## 2022-11-18 DIAGNOSIS — E11.9 TYPE 2 DIABETES MELLITUS WITHOUT COMPLICATION, WITHOUT LONG-TERM CURRENT USE OF INSULIN (HCC): ICD-10-CM

## 2022-11-18 DIAGNOSIS — E78.5 HYPERLIPIDEMIA, UNSPECIFIED HYPERLIPIDEMIA TYPE: ICD-10-CM

## 2022-11-18 RX ORDER — LISINOPRIL 2.5 MG/1
2.5 TABLET ORAL DAILY
Qty: 30 TABLET | Refills: 1 | Status: SHIPPED | OUTPATIENT
Start: 2022-11-18 | End: 2022-11-21 | Stop reason: SDUPTHER

## 2022-11-18 RX ORDER — ROSUVASTATIN CALCIUM 10 MG/1
10 TABLET, COATED ORAL DAILY
Qty: 30 TABLET | Refills: 1 | Status: SHIPPED | OUTPATIENT
Start: 2022-11-18 | End: 2022-11-21 | Stop reason: SDUPTHER

## 2022-11-18 NOTE — TELEPHONE ENCOUNTER
Patients insurance will not cover a 90 days supply at local [facility] and patient does not want to use mail order   Can you send over 30 days supplies to local

## 2022-11-21 ENCOUNTER — TELEPHONE (OUTPATIENT)
Dept: FAMILY MEDICINE CLINIC | Facility: CLINIC | Age: 57
End: 2022-11-21

## 2022-11-21 ENCOUNTER — OFFICE VISIT (OUTPATIENT)
Dept: PHYSICAL THERAPY | Facility: CLINIC | Age: 57
End: 2022-11-21

## 2022-11-21 DIAGNOSIS — M84.469D INSUFFICIENCY FRACTURE OF TIBIA WITH ROUTINE HEALING, SUBSEQUENT ENCOUNTER: ICD-10-CM

## 2022-11-21 DIAGNOSIS — M25.562 ACUTE PAIN OF LEFT KNEE: Primary | ICD-10-CM

## 2022-11-21 DIAGNOSIS — E11.9 TYPE 2 DIABETES MELLITUS WITHOUT COMPLICATION, WITHOUT LONG-TERM CURRENT USE OF INSULIN (HCC): ICD-10-CM

## 2022-11-21 DIAGNOSIS — E78.5 HYPERLIPIDEMIA, UNSPECIFIED HYPERLIPIDEMIA TYPE: ICD-10-CM

## 2022-11-21 DIAGNOSIS — S83.242D TEAR OF MEDIAL MENISCUS OF LEFT KNEE, CURRENT, UNSPECIFIED TEAR TYPE, SUBSEQUENT ENCOUNTER: ICD-10-CM

## 2022-11-21 DIAGNOSIS — M17.12 PRIMARY OSTEOARTHRITIS OF LEFT KNEE: ICD-10-CM

## 2022-11-21 RX ORDER — LISINOPRIL 2.5 MG/1
2.5 TABLET ORAL DAILY
Qty: 90 TABLET | Refills: 0 | Status: SHIPPED | OUTPATIENT
Start: 2022-11-21

## 2022-11-21 RX ORDER — ROSUVASTATIN CALCIUM 10 MG/1
10 TABLET, COATED ORAL DAILY
Qty: 90 TABLET | Refills: 0 | Status: SHIPPED | OUTPATIENT
Start: 2022-11-21

## 2022-11-21 NOTE — TELEPHONE ENCOUNTER
Pt called stating that her insurance will not cover her lisinopril & rosuvastatin if they are filled for 30 days at a local pharmacy  She is requesting 90 day supplies of each medication be sent to [facility]

## 2022-11-21 NOTE — PROGRESS NOTES
Daily Note     Today's date: 2022  Patient name: Jacque Tavares  : 1965  MRN: [id number]  Referring provider: Júnior Metz MD  Dx:   Encounter Diagnosis     ICD-10-CM    1  Acute pain of left knee  M25 562       2  Primary osteoarthritis of left knee  M17 12       3  Insufficiency fracture of tibia with routine healing, subsequent encounter  [id number]       4  Tear of medial meniscus of left knee, current, unspecified tear type, subsequent encounter  [id number]                      Subjective: Patient reports that her knee pain has moved from the front and medial aspect to now the posterior aspect of her knee  She expresses concerns over a baker's cyst which she read on her MRI report and orthopedic note and how these are monitored  Objective: See treatment diary below      Assessment: Tolerated treatment well  Extensive time spent educating patient regarding MRI and orthopedic note in their descriptions of "trace fluid seen in baker's cyst" as well as monitoring via subjective reports, possible aspiration if pain and swelling continue, as well as discussion between generalized knee swelling and swelling from a baker's cyst  Patient verbalized understanding and denied questions  Progressed patient with addition of matrix knee extension and flexion to facilitate improve lower extremity strength  Patient with increased pain when knee resting in extension or trying to achieve TKE  Patient demonstrated fatigue post treatment, exhibited good technique with therapeutic exercises and would benefit from continued PT      Plan: Continue per plan of care        Precautions: DM  Per ortho orders: "NWB ROM and strengthening   Progress to WB strengthening as tolerated based on pain in 2 weeks"    Access Code: [facility]       Manuals 11/10 11/14 11/17 11/21         L knee PROM             L patellar mobility  BE NV BE                                   Neuro Re-Ed             Quad set with towel  HEP  5" 2x10 5" 10x           Supine SLR with quad set   2x10 10x  2x10         Bridges  5" 2x10  5" 2x10         SAQ    5" 2x10 5" 2x 10  5" 2x10                                                Ther Ex             Nustep  L3 10' No tension bike 10 min   L3 10'         Heel slides with strap             Hamstring stretch at step HEP supine Supine 3x30" Supine 3x 30"  Step  3x30"         Prostretch  HEP long sitting Long sitting 3x30" Long sit 3x 30"  prostretch   3x30"         Matrix knee ext    10# 2x10         Matrix hamstring curl    10# 2x10         Clamshells                                        Pt education PT POC, HEP, imaging, anatomy, physiology  DOMS  Bakers cyst vs swelling, MRI results                       Ther Activity             TG squats                          Gait Training                                       Modalities             CP    8 min  5 min

## 2022-11-21 NOTE — TELEPHONE ENCOUNTER
Patient received an email that her results of her mammogram were in [facility] but she cannot see the results in her chart and it shows "not released"  She is concerned about her results and asked if you can review them

## 2022-11-23 ENCOUNTER — APPOINTMENT (OUTPATIENT)
Dept: PHYSICAL THERAPY | Facility: CLINIC | Age: 57
End: 2022-11-23

## 2022-11-28 ENCOUNTER — HOSPITAL ENCOUNTER (OUTPATIENT)
Dept: RADIOLOGY | Facility: MEDICAL CENTER | Age: 57
Discharge: HOME | End: 2022-11-28

## 2022-11-28 DIAGNOSIS — E04.1 THYROID NODULE: ICD-10-CM

## 2022-11-30 ENCOUNTER — APPOINTMENT (OUTPATIENT)
Dept: PHYSICAL THERAPY | Facility: CLINIC | Age: 57
End: 2022-11-30

## 2022-11-30 NOTE — PROGRESS NOTES
Patient contacted therapy office stating that she would like to be DC at this time due to continued pain and that she is likely having surgery  Unable to update objective information at this time

## 2022-12-06 ENCOUNTER — OFFICE VISIT (OUTPATIENT)
Dept: PODIATRY | Facility: CLINIC | Age: 57
End: 2022-12-06

## 2022-12-06 VITALS
SYSTOLIC BLOOD PRESSURE: 120 MMHG | WEIGHT: 202 LBS | DIASTOLIC BLOOD PRESSURE: 82 MMHG | HEART RATE: 78 BPM | BODY MASS INDEX: 34.49 KG/M2 | HEIGHT: 64 IN

## 2022-12-06 DIAGNOSIS — M72.2 PLANTAR FASCIAL FIBROMATOSIS OF BOTH FEET: Primary | ICD-10-CM

## 2022-12-06 DIAGNOSIS — E11.9 TYPE 2 DIABETES MELLITUS WITHOUT COMPLICATION, WITHOUT LONG-TERM CURRENT USE OF INSULIN (HCC): ICD-10-CM

## 2022-12-06 DIAGNOSIS — R22.42 NODULE OF SKIN OF LEFT FOOT: ICD-10-CM

## 2022-12-06 NOTE — PROGRESS NOTES
Assessment/Plan:    Patient today is consistent with plantar fibromas of the right forefoot and left midfoot  The etiology and treatment goals for plantar fibromas were discussed with the patient  Recommend manual massage and manipulation of the fibromas to keep them somewhat pliable and mobile  Acute flareups could be managed with anti-inflammatory therapy or injection therapy  Surgical excision is also an option but due to their high recurrence rates is not usually recommended unless lesions are causing persistent and chronic pain or discomfort  We discussed the importance of being cognizant with types of shoe gear that minimize rubbing and irritation to be fibromas  There are no other acute pedal issues  A diabetic foot examination was performed and the patient is deemed to be in the low risk category  She will follow-up on an as-needed basis  Diagnoses and all orders for this visit:    Plantar fascial fibromatosis of both feet    Type 2 diabetes mellitus without complication, without long-term current use of insulin (HCC)  Comments:  Diabetes is at goal continue current regimen  Glucometer and supplies ordered  Orders:  -     Ambulatory Referral to Podiatry    Nodule of skin of left foot  Comments:  Podiatry eval  Orders:  -     Ambulatory Referral to Podiatry          Subjective:      Patient ID: Dylan Ford is a 61 y o  female  The patient presents today for her initial consultation with [facility] podiatry with a chief complaint of a lump on the plantar aspect of her left foot that has been present for several months  She also notes a similar type lesion on the instep of her right foot as well  She denies any significant tenderness or pain associated with these lesions  She cannot recall any specific injury or trauma to either foot        The following portions of the patient's history were reviewed and updated as appropriate: allergies, current medications, past family history, past medical history, past social history, past surgical history and problem list       PAST MEDICAL HISTORY:  Past Medical History:   Diagnosis Date   • Hyperlipidemia    • Migraine    • Seasonal allergies        PAST SURGICAL HISTORY:  Past Surgical History:   Procedure Laterality Date   • HYSTERECTOMY      partial   • OR COLONOSCOPY FLX DX W/COLLJ SPEC WHEN PFRMD N/A 3/17/2017    Procedure: COLONOSCOPY;  Surgeon: Aneta Alegre MD;  Location: [facility]; Service: Colorectal        ALLERGIES:  Patient has no known allergies  MEDICATIONS:  Current Outpatient Medications   Medication Sig Dispense Refill   • Blood Glucose Monitoring Suppl (OneTouch Verio Reflect) w/Device KIT Check blood sugars once daily  Please substitute with appropriate alternative as covered by patient's insurance  Dx: E11 65 1 kit 0   • Dulaglutide (Trulicity) 4 89 KY/4 0YK SOPN Inject 0 5 mL (0 75 mg total) under the skin once a week 2 mL 5   • glucose blood (OneTouch Verio) test strip Check blood sugars once daily  Please substitute with appropriate alternative as covered by patient's insurance  Dx: E11 65 100 each 3   • HYDROcodone-acetaminophen (Norco) 5-325 mg per tablet Take 1 tablet by mouth daily at bedtime as needed for pain Max Daily Amount: 1 tablet 12 tablet 0   • lisinopril (ZESTRIL) 2 5 mg tablet Take 1 tablet (2 5 mg total) by mouth daily 90 tablet 0   • OneTouch Delica Lancets 57U MISC Check blood sugars once daily  Please substitute with appropriate alternative as covered by patient's insurance  Dx: E11 65 100 each 3   • rosuvastatin (CRESTOR) 10 MG tablet Take 1 tablet (10 mg total) by mouth daily 90 tablet 0   • SUMAtriptan (IMITREX) 100 mg tablet Take 1 tablet (100 mg total) by mouth once as needed for migraine for up to 1 dose 9 tablet 0   • meloxicam (Mobic) 15 mg tablet Take 1 tablet (15 mg total) by mouth daily 30 tablet 1     No current facility-administered medications for this visit         SOCIAL HISTORY:  Social History     Socioeconomic History   • Marital status: Single     Spouse name: None   • Number of children: None   • Years of education: None   • Highest education level: None   Occupational History   • None   Tobacco Use   • Smoking status: Never   • Smokeless tobacco: Never   Vaping Use   • Vaping Use: Never used   Substance and Sexual Activity   • Alcohol use: Yes     Comment: occ   • Drug use: No   • Sexual activity: Not Currently   Other Topics Concern   • None   Social History Narrative    Uses safety equipment-seat belts     Social Determinants of Health     Financial Resource Strain: Not on file   Food Insecurity: Not on file   Transportation Needs: Not on file   Physical Activity: Not on file   Stress: Not on file   Social Connections: Not on file   Intimate Partner Violence: Not on file   Housing Stability: Not on file        Review of Systems   Constitutional: Negative for chills and fever  HENT: Negative for ear pain and sore throat  Eyes: Negative for pain and visual disturbance  Respiratory: Negative for cough and shortness of breath  Cardiovascular: Negative for chest pain and palpitations  Gastrointestinal: Negative for abdominal pain and vomiting  Genitourinary: Negative for dysuria and hematuria  Musculoskeletal: Negative for arthralgias and back pain  Skin: Negative for color change and rash  Neurological: Negative for seizures and syncope  Psychiatric/Behavioral: Negative  All other systems reviewed and are negative  Objective:      Diabetic Foot Exam    Patient's shoes and socks removed  Right Foot/Ankle   Right Foot Inspection  Skin Exam: skin normal and skin intact  No dry skin, no warmth, no callus, no erythema, no maceration, no abnormal color, no pre-ulcer, no ulcer and no callus  Toe Exam: ROM and strength within normal limits       Sensory   Vibration: intact  Proprioception: intact  Monofilament testing: intact    Vascular  Capillary refills: < 3 seconds  The right DP pulse is 2+  The right PT pulse is 2+  Right Toe  - Comprehensive Exam  Ecchymosis: none  Arch: pes cavus  Hammertoes: absent  Claw Toes: absent  Swelling: none   Tenderness: none         Left Foot/Ankle  Left Foot Inspection  Skin Exam: skin normal and skin intact  No dry skin, no warmth, no erythema, no maceration, normal color, no pre-ulcer, no ulcer and no callus  Toe Exam: ROM and strength within normal limits  Sensory   Vibration: intact  Proprioception: intact  Monofilament testing: intact    Vascular  Capillary refills: < 3 seconds  The left DP pulse is 2+  The left PT pulse is 2+  Left Toe  - Comprehensive Exam  Ecchymosis: none  Arch: pes cavus  Hammertoes: absent  Claw toes: absent  Swelling: none   Tenderness: none           Assign Risk Category  No deformity present  No loss of protective sensation  No weak pulses  Risk: 0      /82   Pulse 78   Ht 5' 4" (1 626 m) Comment: verbal  Wt 91 6 kg (202 lb)   BMI 34 67 kg/m²          Physical Exam  Constitutional:       Appearance: Normal appearance  HENT:      Head: Normocephalic and atraumatic  Nose: Nose normal    Cardiovascular:      Pulses: no weak pulses          Dorsalis pedis pulses are 2+ on the right side and 2+ on the left side  Posterior tibial pulses are 2+ on the right side and 2+ on the left side  Pulmonary:      Effort: Pulmonary effort is normal    Feet:      Right foot:      Skin integrity: No ulcer, skin breakdown, erythema, warmth, callus or dry skin  Left foot:      Skin integrity: No ulcer, skin breakdown, erythema, warmth, callus or dry skin  Comments:  There is a plantar fibroma that is noted along the medial band of the left plantar fascia at the level of the mid arch there is minimal tenderness with palpation; there is no erythema nor ecchymosis associated with the soft tissue mass    A smaller plantar fibroma is also noted along the distal medial band of the right foot near the first metatarsophalangeal joint; there is no significant tenderness with palpation nor is there any ecchymosis or erythema associated with the soft tissue mass  Skin:     General: Skin is warm  Capillary Refill: Capillary refill takes less than 2 seconds  Neurological:      General: No focal deficit present  Mental Status: She is alert and oriented to person, place, and time  Psychiatric:         Mood and Affect: Mood normal          Behavior: Behavior normal          Thought Content:  Thought content normal

## 2022-12-09 ENCOUNTER — OFFICE VISIT (OUTPATIENT)
Dept: FAMILY MEDICINE CLINIC | Facility: CLINIC | Age: 57
End: 2022-12-09

## 2022-12-09 VITALS
WEIGHT: 201.2 LBS | OXYGEN SATURATION: 100 % | HEART RATE: 78 BPM | TEMPERATURE: 98.2 F | DIASTOLIC BLOOD PRESSURE: 76 MMHG | SYSTOLIC BLOOD PRESSURE: 128 MMHG | BODY MASS INDEX: 34.35 KG/M2 | HEIGHT: 64 IN

## 2022-12-09 DIAGNOSIS — M25.562 CHRONIC PAIN OF LEFT KNEE: ICD-10-CM

## 2022-12-09 DIAGNOSIS — G89.29 CHRONIC PAIN OF LEFT KNEE: ICD-10-CM

## 2022-12-09 DIAGNOSIS — E11.9 TYPE 2 DIABETES MELLITUS WITHOUT COMPLICATION, WITHOUT LONG-TERM CURRENT USE OF INSULIN (HCC): ICD-10-CM

## 2022-12-09 DIAGNOSIS — Z01.818 PREOPERATIVE EXAMINATION: Primary | ICD-10-CM

## 2022-12-09 DIAGNOSIS — E78.5 HYPERLIPIDEMIA, UNSPECIFIED HYPERLIPIDEMIA TYPE: ICD-10-CM

## 2022-12-09 NOTE — LETTER
December 9, 2022     [address], [address] [place]    Patient: Cheryl Hoff   YOB: 1965   Date of Visit: 12/9/2022       Dear Dr Angélica Bello: Thank you for referring Mejiacandy Kong to me for evaluation  Below are my notes for this consultation  If you have questions, please do not hesitate to call me  I look forward to following your patient along with you  Sincerely,        Taina Yanez PA-C        CC: No Recipients  Taina Yanez PA-C  12/9/2022 11:28 AM  Incomplete  Assessment/Plan:     Diagnoses and all orders for this visit:    Preoperative examination    Type 2 diabetes mellitus without complication, without long-term current use of insulin ([address] )    Hyperlipidemia, unspecified hyperlipidemia type          Subjective:      Patient ID: Cheryl Hoff is a 61 y o  female  Patient presents to the office for preoperative examination  Patient is scheduled have a left knee arthroscopy for a meniscal tear and tibial fracture  He has non-insulin diabetes which is well controlled  Insert controlled as well  Reviewed her recent lab work which is all stable  She states she has some preoperative labs to do  They are not available for review today        The following portions of the patient's history were reviewed and updated as appropriate:   She   Patient Active Problem List    Diagnosis Date Noted   • Primary osteoarthritis of left knee 10/18/2022   • Allergic rhinitis 04/01/2020   • Screening for breast cancer 01/02/2019   • Bilateral thumb pain 01/02/2019   • Lipoma 09/29/2017   • Thyroid nodule 09/29/2017   • Type 2 diabetes mellitus without complication, without long-term current use of insulin ([address] ) 02/07/2017   • Hyperlipidemia 02/07/2017   • Class 2 severe obesity due to excess calories with serious comorbidity in adult [facility]) 02/07/2017   • Common migraine without aura 08/03/2012     Current Outpatient Medications   Medication Sig Dispense Refill   • Blood Glucose Monitoring Suppl (OneTouch Verio Reflect) w/Device KIT Check blood sugars once daily  Please substitute with appropriate alternative as covered by patient's insurance  Dx: E11 65 1 kit 0   • Dulaglutide (Trulicity) 5 55 DE/7 6OO SOPN Inject 0 5 mL (0 75 mg total) under the skin once a week 2 mL 5   • glucose blood (OneTouch Verio) test strip Check blood sugars once daily  Please substitute with appropriate alternative as covered by patient's insurance  Dx: E11 65 100 each 3   • HYDROcodone-acetaminophen (Norco) 5-325 mg per tablet Take 1 tablet by mouth daily at bedtime as needed for pain Max Daily Amount: 1 tablet 12 tablet 0   • lisinopril (ZESTRIL) 2 5 mg tablet Take 1 tablet (2 5 mg total) by mouth daily 90 tablet 0   • OneTouch Delica Lancets 27G MISC Check blood sugars once daily  Please substitute with appropriate alternative as covered by patient's insurance  Dx: E11 65 100 each 3   • rosuvastatin (CRESTOR) 10 MG tablet Take 1 tablet (10 mg total) by mouth daily 90 tablet 0   • SUMAtriptan (IMITREX) 100 mg tablet Take 1 tablet (100 mg total) by mouth once as needed for migraine for up to 1 dose 9 tablet 0   • meloxicam (Mobic) 15 mg tablet Take 1 tablet (15 mg total) by mouth daily 30 tablet 1     No current facility-administered medications for this visit  She has No Known Allergies       Review of Systems   Constitutional: Negative for activity change and unexpected weight change  HENT: Negative for ear pain and sore throat  Eyes: Negative for visual disturbance  Respiratory: Negative for cough, shortness of breath and wheezing  Cardiovascular: Negative for chest pain and leg swelling  Gastrointestinal: Negative for abdominal pain, blood in stool, constipation, diarrhea, nausea and vomiting  Genitourinary: Negative for difficulty urinating  Musculoskeletal: Positive for arthralgias (left  knee  pain)  Negative for myalgias  Skin: Negative for rash  Neurological: Negative for dizziness, syncope, light-headedness and headaches  Psychiatric/Behavioral: Negative for self-injury, sleep disturbance and suicidal ideas  The patient is not nervous/anxious  Objective:        Physical Exam  Vitals and nursing note reviewed  Constitutional:       Appearance: Normal appearance  She is obese  She is not ill-appearing  HENT:      Head: Normocephalic and atraumatic  Right Ear: Tympanic membrane, ear canal and external ear normal       Left Ear: Tympanic membrane, ear canal and external ear normal    Eyes:      Conjunctiva/sclera: Conjunctivae normal    Cardiovascular:      Rate and Rhythm: Normal rate and regular rhythm  Heart sounds: Normal heart sounds  No murmur heard  Pulmonary:      Effort: Pulmonary effort is normal       Breath sounds: Normal breath sounds  Abdominal:      General: Bowel sounds are normal       Palpations: Abdomen is soft  Tenderness: There is no abdominal tenderness  Musculoskeletal:      Right lower leg: No edema  Left lower leg: No edema  Lymphadenopathy:      Cervical: No cervical adenopathy  Skin:     General: Skin is warm and dry  Neurological:      General: No focal deficit present  Mental Status: She is alert     Psychiatric:         Mood and Affect: Mood normal

## 2022-12-09 NOTE — PROGRESS NOTES
Assessment/Plan:     Diagnoses and all orders for this visit:    Preoperative examination  Comments:  Patient is cleared for proposed left knee arthroscopy    Chronic pain of left knee    Type 2 diabetes mellitus without complication, without long-term current use of insulin ([address] )  Comments:  Well-controlled continue current regimen    Hyperlipidemia, unspecified hyperlipidemia type          Subjective:      Patient ID: Woody Navarro is a 61 y o  female  Patient presents to the office for preoperative examination  Patient is scheduled have a left knee arthroscopy for a meniscal tear and tibial fracture  He has non-insulin diabetes which is well controlled  Insert controlled as well  Reviewed her recent lab work which is all stable  She states she has some preoperative labs to do  They are not available for review today  The following portions of the patient's history were reviewed and updated as appropriate:   She   Patient Active Problem List    Diagnosis Date Noted   • Primary osteoarthritis of left knee 10/18/2022   • Allergic rhinitis 04/01/2020   • Screening for breast cancer 01/02/2019   • Bilateral thumb pain 01/02/2019   • Lipoma 09/29/2017   • Thyroid nodule 09/29/2017   • Type 2 diabetes mellitus without complication, without long-term current use of insulin ([address] ) 02/07/2017   • Hyperlipidemia 02/07/2017   • Class 2 severe obesity due to excess calories with serious comorbidity in adult [facility]) 02/07/2017   • Common migraine without aura 08/03/2012     Current Outpatient Medications   Medication Sig Dispense Refill   • Blood Glucose Monitoring Suppl (OneTouch Verio Reflect) w/Device KIT Check blood sugars once daily  Please substitute with appropriate alternative as covered by patient's insurance   Dx: E11 65 1 kit 0   • Dulaglutide (Trulicity) 4 67 EG/4 6SX SOPN Inject 0 5 mL (0 75 mg total) under the skin once a week 2 mL 5   • glucose blood (OneTouch Verio) test strip Check blood sugars once daily  Please substitute with appropriate alternative as covered by patient's insurance  Dx: E11 65 100 each 3   • HYDROcodone-acetaminophen (Norco) 5-325 mg per tablet Take 1 tablet by mouth daily at bedtime as needed for pain Max Daily Amount: 1 tablet 12 tablet 0   • lisinopril (ZESTRIL) 2 5 mg tablet Take 1 tablet (2 5 mg total) by mouth daily 90 tablet 0   • OneTouch Delica Lancets 98A MISC Check blood sugars once daily  Please substitute with appropriate alternative as covered by patient's insurance  Dx: E11 65 100 each 3   • rosuvastatin (CRESTOR) 10 MG tablet Take 1 tablet (10 mg total) by mouth daily 90 tablet 0   • SUMAtriptan (IMITREX) 100 mg tablet Take 1 tablet (100 mg total) by mouth once as needed for migraine for up to 1 dose 9 tablet 0   • meloxicam (Mobic) 15 mg tablet Take 1 tablet (15 mg total) by mouth daily 30 tablet 1     No current facility-administered medications for this visit  She has No Known Allergies       Review of Systems   Constitutional: Negative for activity change and unexpected weight change  HENT: Negative for ear pain and sore throat  Eyes: Negative for visual disturbance  Respiratory: Negative for cough, shortness of breath and wheezing  Cardiovascular: Negative for chest pain and leg swelling  Gastrointestinal: Negative for abdominal pain, blood in stool, constipation, diarrhea, nausea and vomiting  Genitourinary: Negative for difficulty urinating  Musculoskeletal: Positive for arthralgias (left  knee  pain)  Negative for myalgias  Skin: Negative for rash  Neurological: Negative for dizziness, syncope, light-headedness and headaches  Psychiatric/Behavioral: Negative for self-injury, sleep disturbance and suicidal ideas  The patient is not nervous/anxious  Objective:        Physical Exam  Vitals and nursing note reviewed  Constitutional:       Appearance: Normal appearance  She is obese  She is not ill-appearing     HENT:      Head: Normocephalic and atraumatic  Right Ear: Tympanic membrane, ear canal and external ear normal       Left Ear: Tympanic membrane, ear canal and external ear normal    Eyes:      Conjunctiva/sclera: Conjunctivae normal    Cardiovascular:      Rate and Rhythm: Normal rate and regular rhythm  Heart sounds: Normal heart sounds  No murmur heard  Pulmonary:      Effort: Pulmonary effort is normal       Breath sounds: Normal breath sounds  Abdominal:      General: Bowel sounds are normal       Palpations: Abdomen is soft  Tenderness: There is no abdominal tenderness  Musculoskeletal:      Right lower leg: No edema  Left lower leg: No edema  Lymphadenopathy:      Cervical: No cervical adenopathy  Skin:     General: Skin is warm and dry  Neurological:      General: No focal deficit present  Mental Status: She is alert  Psychiatric:         Mood and Affect: Mood normal          Behavior: Behavior normal          Thought Content:  Thought content normal          Judgment: Judgment normal

## 2022-12-13 LAB — HBA1C MFR BLD HPLC: 6.4 %

## 2022-12-13 PROCEDURE — 3044F HG A1C LEVEL LT 7.0%: CPT | Performed by: PHYSICIAN ASSISTANT

## 2022-12-16 ENCOUNTER — CLINICAL SUPPORT (OUTPATIENT)
Dept: FAMILY MEDICINE CLINIC | Facility: CLINIC | Age: 57
End: 2022-12-16

## 2022-12-16 ENCOUNTER — TELEPHONE (OUTPATIENT)
Dept: FAMILY MEDICINE CLINIC | Facility: CLINIC | Age: 57
End: 2022-12-16

## 2022-12-16 DIAGNOSIS — Z01.818 PRE-OP EVALUATION: Primary | ICD-10-CM

## 2023-01-17 ENCOUNTER — EVALUATION (OUTPATIENT)
Dept: PHYSICAL THERAPY | Facility: CLINIC | Age: 58
End: 2023-01-17

## 2023-01-17 DIAGNOSIS — M25.562 ACUTE PAIN OF LEFT KNEE: Primary | ICD-10-CM

## 2023-01-17 NOTE — LETTER
2023    Carrizalessaumya Gonzalez DO  [address]  [address]    Patient: Eduard Norton   YOB: 1965   Date of Visit: 2023     Encounter Diagnosis     ICD-10-CM    1  Acute pain of left knee  M25 562           Dear Dr Mary Alfaro: Thank you for your recent referral of Eduard Norton  Please review the attached evaluation summary from Leah's recent visit  Please verify that you agree with the plan of care by signing the attached order  If you have any questions or concerns, please do not hesitate to call  I sincerely appreciate the opportunity to share in the care of one of your patients and hope to have another opportunity to work with you in the near future  Sincerely,    Mikala Combs, PT      Referring Provider:      I certify that I have read the below Plan of Care and certify the need for these services furnished under this plan of treatment while under my care  All Gonzalez DO  [address] [facility]    [place] [phone]  Via Fax: [phone]          PT Evaluation     Today's date: 2023  Patient name: Eduard Norton  : 1965  MRN: [id number]  Referring provider: Aby Ramos DO  Dx:   Encounter Diagnosis     ICD-10-CM    1  Acute pain of left knee  M25 562           Start Time: 5908  Stop Time: 0830  Total time in clinic (min): 55 minutes    Assessment  Assessment details: Eduard Norton is a 61 y o  female who presents s/p arthroscopy of the L knee with partial medial and lateral menisectomies, partial synovectomy, chondroplasties of the medial femoral condyle and patella, subchondroplasty to the medial tibial  Plateau, DOS:   Patient presents with joint effusion/ecchymosis, limited/painful knee ROM, decreased strength, fair quadriceps activation, antalgic gait and crepitus with patellofemoral joint mobility   Due to these impairments, Patient has difficulty performing a/iadls, recreational activities, work-related activities and engaging in social activities  Patient has been educated in post-op contraindications / precautions and wound care  Patient would benefit from skilled physical therapy to address their aforementioned impairments, improve their level of function and to improve their overall quality of life  Impairments: abnormal gait, abnormal muscle firing, abnormal muscle tone, abnormal or restricted ROM, activity intolerance, impaired balance, impaired physical strength, lacks appropriate home exercise program, pain with function, safety issue, weight-bearing intolerance and poor body mechanics  Understanding of Dx/Px/POC: excellent   Prognosis: good    Goals  Short Term Goals: to be achieved by 4 weeks  1) Patient to be independent with basic HEP  2) Decrease pain by 3/10 at its worst   3) Increase LE strength by 1/2 MMT grade in all deficient planes  4) Patient to negotiate steps with a reciprocal pattern with use of HR  5) Patient to report decreased sleep interruption secondary to pain  6) Increase ambulatory tolerance by 10 min with LRAD  Long Term Goals: to be achieved by discharge  1) FOTO equal to or greater than TBD  2) Patient to be independent with comprehensive HEP  3) Abolish pain for improved quality of life  4) Increase LE strength to 5/5 MMT grade in all planes to improve a/iadls  5) Achieve full knee extension ROM to improve a/iadls  6) Increase knee flexion ROM to within 5 deg of contralateral LE to improve a/iadls  7) Patient to negotiate steps with a reciprocal pattern without use of Hr  8) Increase ambulatory tolerance to 60 min to improve participation in social activities  9) Patient to report no sleep interruption secondary to pain      Plan  Patient would benefit from: skilled PT  Planned modality interventions: biofeedback, cryotherapy, electrical stimulation/Russian stimulation, TENS and low level laser therapy  Planned therapy interventions: activity modification, ADL retraining, ADL training, balance, balance/weight bearing training, body mechanics training, dressing changes, functional ROM exercises, gait training, home exercise program, IADL retraining, joint mobilization, manual therapy, massage, neuromuscular re-education, patient education, self care, strengthening, stretching, therapeutic activities, therapeutic exercise and transfer training  Frequency: 2-3x week  Duration in weeks: 12  Plan of Care beginning date: 2023  Plan of Care expiration date: 2023  Treatment plan discussed with: patient        Subjective Evaluation    History of Present Illness  Mechanism of injury: Patient presents with c/c of insidious onset of left knee pain beginning in August  Patient went to ortho and received a steroid injection without relief  Patient referred to physical therapy and failed to improve  Patient had trialed use of a knee sleeve without relief as well  Patient's knee ROM and pain progressively declined and she opted for surgical intervention  Patient currently presents s/p arthroscopy of the L knee with partial medial and lateral menisectomies, partial synovectomy, chondroplasties of the medial femoral condyle and patella, subchondroplasty to the medial tibial  Plateau, DOS: 60/65/29  Patient denies experiencing acute post-operative complications  Patient's pain is well-controlled with OTC NSAIDs  Patient's quality of sleep is poor  Patient denies experiencing tingling/numbness or crepitus  Patient estimates her left knee overall level of function to be approximately 50% of her premorbid norm  Patient's next f/u appointment with ortho is scheduled for   Pain  Current pain rating: 3  At best pain ratin  At worst pain ratin  Location: left knee anterior  Alleviating factors: Tylenol PRN, ice, rest   Exacerbated by: prolonged sitting, negotiating steps, driving, prolonged walking, kneeling      Social Support    Employment status: working ([email or web address] - working strictly from home)  Patient Goals  Patient goals for therapy: decreased pain, increased motion, increased strength, independence with ADLs/IADLs, return to sport/leisure activities, return to work and decreased edema          Objective     Observations   Left Knee   Positive for edema (+) mild to moderate ecchymosis over anterior shin, effusion and incision (Clean, well-approximated with mild eschar, no signs of infection)  Active Range of Motion   Left Knee   Flexion: 112 degrees with pain  Extension: 2 degrees     Right Knee   Flexion: 129 degrees   Extension: 0 degrees     Passive Range of Motion   Left Knee   Flexion: 122 degrees with pain  Extension: 0 degrees     Right Knee   Flexion: 131 degrees   Extension: 0 degrees     Mobility   Patellar Mobility:   Left Knee   WFL: medial, lateral, superior and inferior  Right Knee   WFL: medial, lateral, superior and inferior    Patellar Mobility Comments   Left superior tendon comments: (+) crepitus  Left inferior tendon comments: (+) crepitus  Strength/Myotome Testing     Left Hip   Planes of Motion   Flexion: 4+  Extension: 5  Abduction: 4+    Right Hip   Planes of Motion   Flexion: 5  Extension: 5  Abduction: 5    Left Knee   Flexion: 5  Extension: 5  Quadriceps contraction: fair (able to perform supine SLR without extension lag)    Right Knee   Flexion: 5  Extension: 5  Quadriceps contraction: good    Left Ankle/Foot   Dorsiflexion: 5    Right Ankle/Foot   Dorsiflexion: 5    Ambulation   Weight-Bearing Status   Weight-Bearing Status (Left): full weight bearing   Weight-Bearing Status (Right): full weight-bearing    Assistive device used: none    Ambulation: Level Surfaces   Ambulation without assistive device: independent    Observational Gait   Gait: antalgic     Functional Assessment      Squat    Pain and trunk lean right  Forward Step Up 8"   Left Leg  Within functional limits       Right Leg  Within functional limits and pain  Single Leg Stance   Left: 13 seconds  Right: 30 seconds    Comments  Forward Step Down 8" step: Mod I with unilateral HR, fair eccentric contrl, (+) pain            Precautions: s/p arthroscopy of the L knee with partial medial and lateral menisectomies, partial synovectomy, chondroplasties of the medial femoral condyle and patella, subchondroplasty to the medial tibial  Plateau, DOS: 04/30/78      Manuals 1/17            L knee PROM             L gastroc, hip flexor, h/s str  Gr  II-IV pat mobs                          Neuro Re-Ed             Alejandra: TKE             SAQ             Supine SLR 2x10            Quad sets with heel prop 20x5"                                                   Ther Ex             Patient education: pathophysiology, surgical overview, HEP review, graded activity GR            Warm-up, cardiovascular conditioning             Ankle pumps HEP            Long-sitting calf str  3x30"            Supine heel slides 20x10"            Hip abduction - sidelying             Prone quad str  Standing h/s curl             Prostretch             HR             Standing hip abd, ext with TB                          Ther Activity             Total gym: bilateral squats             FSU             FSD             Forward, lateral stepping over hurdles                                       Gait Training                                       Modalities                                        Access Code: [email or web address]  URL: [email or web address]  Date: 01/17/2023  Prepared by: Mikala Combs    Exercises  • Ankle Pumps in Elevation - 2-3 x daily - 7 x weekly - 10 min hold  • Long Sitting Quad Set with Towel Roll Under Heel - 2-3 x daily - 7 x weekly - 2 sets - 10 reps - 5 seconds hold  • Long Sitting Calf Stretch with Strap - 2-3 x daily - 7 x weekly - 3 sets - 1 reps - 30 seconds hold  • Supine Active Straight Leg Raise - 2-3 x daily - 7 x weekly - 2 sets - 10 reps  • Supine Heel Slide with Strap - 2-3 x daily - 7 x weekly - 2 sets - 10 reps - 10 seconds hold

## 2023-01-17 NOTE — PROGRESS NOTES
PT Evaluation     Today's date: 2023  Patient name: Zaina Khan  : 1965  MRN: [id number]  Referring provider: Rickard Curling, DO  Dx:   Encounter Diagnosis     ICD-10-CM    1  Acute pain of left knee  M25 562           Start Time:   Stop Time: 830  Total time in clinic (min): 55 minutes    Assessment  Assessment details: Zaina Khan is a 61 y o  female who presents s/p arthroscopy of the L knee with partial medial and lateral menisectomies, partial synovectomy, chondroplasties of the medial femoral condyle and patella, subchondroplasty to the medial tibial  Plateau, DOS:   Patient presents with joint effusion/ecchymosis, limited/painful knee ROM, decreased strength, fair quadriceps activation, antalgic gait and crepitus with patellofemoral joint mobility  Due to these impairments, Patient has difficulty performing a/iadls, recreational activities, work-related activities and engaging in social activities  Patient has been educated in post-op contraindications / precautions and wound care  Patient would benefit from skilled physical therapy to address their aforementioned impairments, improve their level of function and to improve their overall quality of life  Impairments: abnormal gait, abnormal muscle firing, abnormal muscle tone, abnormal or restricted ROM, activity intolerance, impaired balance, impaired physical strength, lacks appropriate home exercise program, pain with function, safety issue, weight-bearing intolerance and poor body mechanics  Understanding of Dx/Px/POC: excellent   Prognosis: good    Goals  Short Term Goals: to be achieved by 4 weeks  1) Patient to be independent with basic HEP  2) Decrease pain by 3/10 at its worst   3) Increase LE strength by 1/2 MMT grade in all deficient planes  4) Patient to negotiate steps with a reciprocal pattern with use of HR  5) Patient to report decreased sleep interruption secondary to pain    6) Increase ambulatory tolerance by 10 min with LRAD  Long Term Goals: to be achieved by discharge  1) FOTO equal to or greater than TBD  2) Patient to be independent with comprehensive HEP  3) Abolish pain for improved quality of life  4) Increase LE strength to 5/5 MMT grade in all planes to improve a/iadls  5) Achieve full knee extension ROM to improve a/iadls  6) Increase knee flexion ROM to within 5 deg of contralateral LE to improve a/iadls  7) Patient to negotiate steps with a reciprocal pattern without use of Hr  8) Increase ambulatory tolerance to 60 min to improve participation in social activities  9) Patient to report no sleep interruption secondary to pain  Plan  Patient would benefit from: skilled PT  Planned modality interventions: biofeedback, cryotherapy, electrical stimulation/Russian stimulation, TENS and low level laser therapy  Planned therapy interventions: activity modification, ADL retraining, ADL training, balance, balance/weight bearing training, body mechanics training, dressing changes, functional ROM exercises, gait training, home exercise program, IADL retraining, joint mobilization, manual therapy, massage, neuromuscular re-education, patient education, self care, strengthening, stretching, therapeutic activities, therapeutic exercise and transfer training  Frequency: 2-3x week  Duration in weeks: 12  Plan of Care beginning date: 1/17/2023  Plan of Care expiration date: 4/11/2023  Treatment plan discussed with: patient        Subjective Evaluation    History of Present Illness  Mechanism of injury: Patient presents with c/c of insidious onset of left knee pain beginning in August  Patient went to ortho and received a steroid injection without relief  Patient referred to physical therapy and failed to improve  Patient had trialed use of a knee sleeve without relief as well  Patient's knee ROM and pain progressively declined and she opted for surgical intervention   Patient currently presents s/p arthroscopy of the L knee with partial medial and lateral menisectomies, partial synovectomy, chondroplasties of the medial femoral condyle and patella, subchondroplasty to the medial tibial  Plateau, DOS: 59  Patient denies experiencing acute post-operative complications  Patient's pain is well-controlled with OTC NSAIDs  Patient's quality of sleep is poor  Patient denies experiencing tingling/numbness or crepitus  Patient estimates her left knee overall level of function to be approximately 50% of her premorbid norm  Patient's next f/u appointment with ortho is scheduled for   Pain  Current pain rating: 3  At best pain ratin  At worst pain ratin  Location: left knee anterior  Alleviating factors: Tylenol PRN, ice, rest   Exacerbated by: prolonged sitting, negotiating steps, driving, prolonged walking, kneeling  Social Support    Employment status: working ([employer] - working strictly from home)  Patient Goals  Patient goals for therapy: decreased pain, increased motion, increased strength, independence with ADLs/IADLs, return to sport/leisure activities, return to work and decreased edema          Objective     Observations   Left Knee   Positive for edema (+) mild to moderate ecchymosis over anterior shin, effusion and incision (Clean, well-approximated with mild eschar, no signs of infection)  Active Range of Motion   Left Knee   Flexion: 112 degrees with pain  Extension: 2 degrees     Right Knee   Flexion: 129 degrees   Extension: 0 degrees     Passive Range of Motion   Left Knee   Flexion: 122 degrees with pain  Extension: 0 degrees     Right Knee   Flexion: 131 degrees   Extension: 0 degrees     Mobility   Patellar Mobility:   Left Knee   WFL: medial, lateral, superior and inferior  Right Knee   WFL: medial, lateral, superior and inferior    Patellar Mobility Comments   Left superior tendon comments: (+) crepitus  Left inferior tendon comments: (+) crepitus  Strength/Myotome Testing     Left Hip   Planes of Motion   Flexion: 4+  Extension: 5  Abduction: 4+    Right Hip   Planes of Motion   Flexion: 5  Extension: 5  Abduction: 5    Left Knee   Flexion: 5  Extension: 5  Quadriceps contraction: fair (able to perform supine SLR without extension lag)    Right Knee   Flexion: 5  Extension: 5  Quadriceps contraction: good    Left Ankle/Foot   Dorsiflexion: 5    Right Ankle/Foot   Dorsiflexion: 5    Ambulation   Weight-Bearing Status   Weight-Bearing Status (Left): full weight bearing   Weight-Bearing Status (Right): full weight-bearing    Assistive device used: none    Ambulation: Level Surfaces   Ambulation without assistive device: independent    Observational Gait   Gait: antalgic     Functional Assessment      Squat    Pain and trunk lean right  Forward Step Up 8"   Left Leg  Within functional limits  Right Leg  Within functional limits and pain  Single Leg Stance   Left: 13 seconds  Right: 30 seconds    Comments  Forward Step Down 8" step: Mod I with unilateral HR, fair eccentric contrl, (+) pain             Precautions: s/p arthroscopy of the L knee with partial medial and lateral menisectomies, partial synovectomy, chondroplasties of the medial femoral condyle and patella, subchondroplasty to the medial tibial  Plateau, DOS: 33/29/96      Manuals 1/17            L knee PROM             L gastroc, hip flexor, h/s str  Gr  II-IV pat mobs                          Neuro Re-Ed             Alejandra: TKE             SAQ             Supine SLR 2x10            Quad sets with heel prop 20x5"                                                   Ther Ex             Patient education: pathophysiology, surgical overview, HEP review, graded activity GR            Warm-up, cardiovascular conditioning             Ankle pumps HEP            Long-sitting calf str   3x30"            Supine heel slides 20x10"            Hip abduction - sidelying             Prone quad str              Standing h/s curl             Prostretch             HR             Standing hip abd, ext with TB                          Ther Activity             Total gym: bilateral squats             FSU             FSD             Forward, lateral stepping over hurdles                                       Gait Training                                       Modalities                                         Access Code: [email or web address]  URL: [email or web address]  Date: 01/17/2023  Prepared by: Kalani Maldonado    Exercises  • Ankle Pumps in Elevation - 2-3 x daily - 7 x weekly - 10 min hold  • Long Sitting Quad Set with Towel Roll Under Heel - 2-3 x daily - 7 x weekly - 2 sets - 10 reps - 5 seconds hold  • Long Sitting Calf Stretch with Strap - 2-3 x daily - 7 x weekly - 3 sets - 1 reps - 30 seconds hold  • Supine Active Straight Leg Raise - 2-3 x daily - 7 x weekly - 2 sets - 10 reps  • Supine Heel Slide with Strap - 2-3 x daily - 7 x weekly - 2 sets - 10 reps - 10 seconds hold

## 2023-01-23 ENCOUNTER — OFFICE VISIT (OUTPATIENT)
Dept: PHYSICAL THERAPY | Facility: CLINIC | Age: 58
End: 2023-01-23

## 2023-01-23 DIAGNOSIS — M25.562 ACUTE PAIN OF LEFT KNEE: Primary | ICD-10-CM

## 2023-01-23 NOTE — PROGRESS NOTES
Daily Note     Today's date: 2023  Patient name: Eduard Norton  : 1965  MRN: [id number]  Referring provider: Aby Ramos DO  Dx:   Encounter Diagnosis     ICD-10-CM    1  Acute pain of left knee  M25 562           Start Time: 1600  Stop Time: 1639  Total time in clinic (min): 39 minutes    Subjective: Pt noted that she does have some discomfort today and she is not sure if it is due to the weather  Next follow up is on   Objective: See treatment diary below      Assessment: Continued with treatment session, overall patient was able to complete all exercises with no increase in pain  Pt noticed a light stretch with knee flexion with minimal muscle tension  Tolerated treatment fair  Patient exhibited good technique with therapeutic exercises and would benefit from continued PT  S/p treatment session noted no significant changed  Advised patient to continue with her HEP as well as educated on DOMS  Plan: Continue per plan of care  Precautions: s/p arthroscopy of the L knee with partial medial and lateral menisectomies, partial synovectomy, chondroplasties of the medial femoral condyle and patella, subchondroplasty to the medial tibial  Plateau, DOS:       Manuals            L knee PROM  SC           L gastroc, hip flexor, h/s str  SC           Gr  II-IV pat mobs                          Neuro Re-Ed             Alejandra: TKE             SAQ  2x 10            Supine SLR 2x10 2x 10            Quad sets with heel prop 20x5" 20x 5" hold                                                   Ther Ex             Patient education: pathophysiology, surgical overview, HEP review, graded activity GR            Warm-up, cardiovascular conditioning  nuestp L4 10 min            Ankle pumps HEP            Long-sitting calf str  3x30" 3x 30"            Supine heel slides 20x10" 20x 5"            Hip abduction - sidelying             Prone quad str               Standing h/s curl Prostretch             HR             Standing hip abd, ext with TB                          Ther Activity             Total gym: bilateral squats             FSU             FSD             Forward, lateral stepping over hurdles                                       Gait Training                                       Modalities

## 2023-01-25 ENCOUNTER — APPOINTMENT (OUTPATIENT)
Dept: PHYSICAL THERAPY | Facility: CLINIC | Age: 58
End: 2023-01-25

## 2023-01-30 ENCOUNTER — APPOINTMENT (OUTPATIENT)
Dept: PHYSICAL THERAPY | Facility: CLINIC | Age: 58
End: 2023-01-30

## 2023-02-01 ENCOUNTER — OFFICE VISIT (OUTPATIENT)
Dept: PHYSICAL THERAPY | Facility: CLINIC | Age: 58
End: 2023-02-01

## 2023-02-01 DIAGNOSIS — M25.562 ACUTE PAIN OF LEFT KNEE: Primary | ICD-10-CM

## 2023-02-01 NOTE — PROGRESS NOTES
Daily Note     Today's date: 2023  Patient name: Abilio Anaya  : 1965  MRN: [id number]  Referring provider: Orville Tee DO  Dx:   Encounter Diagnosis     ICD-10-CM    1  Acute pain of left knee  M25 562           Start Time: 1601  Stop Time: 1705  Total time in clinic (min): 64 minutes    Subjective: Patient reports that her knee remains stiff, swollen and sore  Patient has been resting and elevating her leg as much as possible  Objective: See treatment diary below      Assessment: Tolerated treatment well  Patient demonstrated fatigue post treatment and would benefit from continued PT  Patient reported minor soreness in knee after negotiating steps  Good eccentric control descending steps  Plan: Continue per plan of care  Progress treatment as tolerated  Precautions: s/p arthroscopy of the L knee with partial medial and lateral menisectomies, partial synovectomy, chondroplasties of the medial femoral condyle and patella, subchondroplasty to the medial tibial  Plateau, DOS: 89/64/84      Manuals           L knee PROM  SC           L gastroc, hip flexor, h/s str  SC           Gr  II-IV pat mobs                          Neuro Re-Ed             Alejandra: TKE   7# 20x5"          SAQ  2x 10            Supine SLR 2x10 2x 10            Quad sets with heel prop 20x5" 20x 5" hold                                                   Ther Ex             Patient education: pathophysiology, surgical overview, HEP review, graded activity GR            Warm-up, cardiovascular conditioning  nuestp L4 10 min  Nustep LE L3 10 min          Ankle pumps HEP            Long-sitting calf str  3x30" 3x 30"  Prostretch 5x30"          Supine heel slides 20x10" 20x 5"  Reviewed          Hip abduction - sidelying             Prone quad str     5x30"          Standing h/s curl   3# 3x10          Prostretch   5x30"          HR             Standing hip abd, ext with TB             LAQ   3# 2x10 Ther Activity             Total gym: bilateral squats   L22 3x10          FSU   8" 2x10          FSD   6" 2x10          Forward, lateral stepping over hurdles                                       Gait Training                                       Modalities

## 2023-02-06 ENCOUNTER — APPOINTMENT (OUTPATIENT)
Dept: PHYSICAL THERAPY | Facility: CLINIC | Age: 58
End: 2023-02-06

## 2023-02-06 ENCOUNTER — OFFICE VISIT (OUTPATIENT)
Dept: PHYSICAL THERAPY | Facility: CLINIC | Age: 58
End: 2023-02-06

## 2023-02-06 DIAGNOSIS — M25.562 ACUTE PAIN OF LEFT KNEE: Primary | ICD-10-CM

## 2023-02-06 NOTE — PROGRESS NOTES
Daily Note     Today's date: 2023  Patient name: Kenny Callahan  : 1965  MRN: [id number]  Referring provider: Juancarlos Finley DO  Dx:   Encounter Diagnosis     ICD-10-CM    1  Acute pain of left knee  M25 562           Start Time: 161  Stop Time: 1648  Total time in clinic (min): 37 minutes    Subjective: Pt noted that she does have some medial knee soreness  Next follow up with her MD noted on 23  Objective: See treatment diary below      Assessment: Continued with treatment session  Minimal to no progressions added  Tolerated treatment fairly well  Patient exhibited good technique with therapeutic exercises and would benefit from continued PT  S/p treatment session, Pt noted she feels better than upon arrival but noted that she would like to hold the stretches since she feels good and would not like to push it  Education:   DOMS - 24 to 48 hours  Plan: Continue per plan of care  Precautions: s/p arthroscopy of the L knee with partial medial and lateral menisectomies, partial synovectomy, chondroplasties of the medial femoral condyle and patella, subchondroplasty to the medial tibial  Plateau, DOS:       Manuals  2         L knee PROM  SC           L gastroc, hip flexor, h/s str  SC           Gr  II-IV pat mobs                          Neuro Re-Ed             Alejandra: TKE   7# 20x5" 7# 20x5"         SAQ  2x 10            Supine SLR 2x10 2x 10            Quad sets with heel prop 20x5" 20x 5" hold                                                   Ther Ex             Patient education: pathophysiology, surgical overview, HEP review, graded activity GR            Warm-up, cardiovascular conditioning  nuestp L4 10 min  Nustep LE L3 10 min Nustep LE L3 10 min         Ankle pumps HEP            Long-sitting calf str  3x30" 3x 30"  Prostretch 5x30"          Supine heel slides 20x10" 20x 5"  Reviewed          Hip abduction - sidelying             Prone quad str  5x30"          Standing h/s curl   3# 3x10 3x 10 3#          Prostretch   5x30"          HR             Standing hip abd, ext with TB             LAQ   3# 2x10 3# 2x 10          Ther Activity             Total gym: bilateral squats   L22 3x10 L22 3x 10          FSU   8" 2x10 8" 2x 10          FSD   6" 2x10 6" 2x 10          Forward, lateral stepping over hurdles                                       Gait Training                                       Modalities

## 2023-02-07 ENCOUNTER — APPOINTMENT (OUTPATIENT)
Dept: PHYSICAL THERAPY | Facility: CLINIC | Age: 58
End: 2023-02-07

## 2023-02-08 ENCOUNTER — APPOINTMENT (OUTPATIENT)
Dept: PHYSICAL THERAPY | Facility: CLINIC | Age: 58
End: 2023-02-08

## 2023-02-13 ENCOUNTER — APPOINTMENT (OUTPATIENT)
Dept: PHYSICAL THERAPY | Facility: CLINIC | Age: 58
End: 2023-02-13

## 2023-02-15 ENCOUNTER — EVALUATION (OUTPATIENT)
Dept: PHYSICAL THERAPY | Facility: CLINIC | Age: 58
End: 2023-02-15

## 2023-02-15 DIAGNOSIS — M25.562 ACUTE PAIN OF LEFT KNEE: Primary | ICD-10-CM

## 2023-02-15 NOTE — PROGRESS NOTES
PT Evaluation     Today's date: 2/15/2023  Patient name: Alise Valero  : 1965  MRN: [id number]  Referring provider: Bina Page DO  Dx:   Encounter Diagnosis     ICD-10-CM    1  Acute pain of left knee  M25 562           Start Time: 161  Stop Time: 1720  Total time in clinic (min): 61 minutes    Assessment  Assessment details: Alise Valero is a 61 y o  female who is currently approximately 6 weeks s/p arthroscopy of the L knee with partial medial and lateral menisectomies, partial synovectomy, chondroplasties of the medial femoral condyle and patella, subchondroplasty to the medial tibial  Plateau, DOS: , and has attended a total of 5 PT appointments to date  Patient has maintained fair compliance with established POC and has made gradual progress towards goals  Patient presents with decreased pain, improved knee flexion ROM, increased strength and improved functional mobility  Despite these improvements she continues to present with joint effusion, decreased knee extension ROM, decreased hip girdle strength, fair quadriceps activation and patellofemoral hypomobility with pain/crepitus  Due to these impairments, Patient has difficulty performing a/iadls, recreational activities and engaging in social activities  Patient would benefit from home neuromuscular electrical stimulation unit secondary to fair quadriceps tone and weakness  Patient would benefit from skilled physical therapy to address their aforementioned impairments, improve their level of function and to improve their overall quality of life      Impairments: abnormal muscle firing, abnormal muscle tone, abnormal or restricted ROM, activity intolerance, impaired physical strength, lacks appropriate home exercise program, pain with function, weight-bearing intolerance and poor body mechanics  Understanding of Dx/Px/POC: excellent   Prognosis: good    Goals  Short Term Goals: to be achieved by 4 weeks  1) Patient to be independent with basic HEP  MET  2) Decrease pain by 3/10 at its worst  PROGRESSED, BUT NOT MET  3) Increase LE strength by 1/2 MMT grade in all deficient planes  MET  4) Patient to negotiate steps with a reciprocal pattern with use of HR  MET  5) Patient to report decreased sleep interruption secondary to pain  MET  6) Increase ambulatory tolerance by 10 min with LRAD  MET    Long Term Goals: to be achieved by discharge ALL GOALS PROGRESSING  1) FOTO equal to or greater than TBD  2) Patient to be independent with comprehensive HEP  3) Abolish pain for improved quality of life  4) Increase LE strength to 5/5 MMT grade in all planes to improve a/iadls  5) Achieve full knee extension ROM to improve a/iadls  6) Increase knee flexion ROM to within 5 deg of contralateral LE to improve a/iadls  7) Patient to negotiate steps with a reciprocal pattern without use of Hr  8) Increase ambulatory tolerance to 60 min to improve participation in social activities  9) Patient to report no sleep interruption secondary to pain  Plan  Patient would benefit from: skilled PT  Planned modality interventions: biofeedback, cryotherapy, electrical stimulation/Russian stimulation, low level laser therapy and TENS  Planned therapy interventions: activity modification, ADL retraining, ADL training, balance, balance/weight bearing training, body mechanics training, dressing changes, functional ROM exercises, gait training, home exercise program, IADL retraining, joint mobilization, manual therapy, massage, neuromuscular re-education, patient education, self care, strengthening, stretching, therapeutic activities, therapeutic exercise and transfer training  Frequency: 1-3x week    Duration in weeks: 6  Plan of Care beginning date: 2/15/2023  Plan of Care expiration date: 3/29/2023  Treatment plan discussed with: patient        Subjective Evaluation    History of Present Illness  Mechanism of injury: Patient reports that overall her left knee pain has significantly improved and she is generally more mobile  Patient continues to be unable to kneel and compensates when squatting d/t weakness and fear avoidance  Patient continues to be challenged with negotiating steps  Patient has been able to increase her ambulation to 1 hour, but needs to rest after  Patient has noticed increased stiffness after prolonged sitting  Patient feels that her post-operative swelling remains the same, but her knee is starting to feel more stiff and swollen in the back d/t her Baker's cyst    Pain  Current pain rating: 3  At best pain ratin  At worst pain ratin  Location: left knee anterior  Alleviating factors: Tylenol PRN, ice, rest   Exacerbated by: prolonged sitting, negotiating steps, driving, prolonged walking, kneeling  Social Support    Employment status: working ([employer] - working strictly from home)  Treatments  Current treatment: physical therapy  Patient Goals  Patient goals for therapy: decreased pain, increased motion, increased strength, independence with ADLs/IADLs, return to sport/leisure activities, return to work and decreased edema          Objective     Observations   Left Knee   Positive for effusion and incision (Clean, well-approximated, no signs of infection)  Tenderness   Left Knee   Tenderness in the lateral joint line, lateral patella, medial joint line, medial patella and popliteal fossa       Active Range of Motion   Left Knee   Flexion: 130 degrees with pain  Extension: 3 degrees     Right Knee   Flexion: 130 degrees   Extension: 0 degrees     Passive Range of Motion   Left Knee   Flexion: 132 degrees with pain  Extension: 2 degrees     Right Knee   Flexion: 133 degrees   Extension: 0 degrees     Mobility   Patellar Mobility:   Left Knee   WFL: medial and lateral    Hypomobile: left superior and left inferior    Right Knee   WFL: medial, lateral, superior and inferior    Patellar Mobility Comments   Left superior tendon comments: (+) crepitus  Left inferior tendon comments: (+) crepitus  Strength/Myotome Testing     Left Hip   Planes of Motion   Flexion: 5  Extension: 5  Abduction: 4+    Right Hip   Planes of Motion   Flexion: 5  Extension: 5  Abduction: 5    Left Knee   Flexion: 5  Extension: 5  Quadriceps contraction: fair (able to perform supine SLR without extension lag)    Right Knee   Flexion: 5  Extension: 5  Quadriceps contraction: good    Left Ankle/Foot   Dorsiflexion: 5    Right Ankle/Foot   Dorsiflexion: 5    Ambulation   Weight-Bearing Status   Weight-Bearing Status (Left): full weight bearing   Weight-Bearing Status (Right): full weight-bearing    Assistive device used: none    Ambulation: Level Surfaces   Ambulation without assistive device: independent    Observational Gait   Gait: within functional limits     Functional Assessment      Squat    Left within functional limits and pain  Forward Step Up 8"   Left Leg  Within functional limits  Right Leg  Within functional limits  Comments  Forward Step Down 8" step: Mod I with unilateral HR, fair eccentric contrl, (+) pain             Precautions: s/p arthroscopy of the L knee with partial medial and lateral menisectomies, partial synovectomy, chondroplasties of the medial femoral condyle and patella, subchondroplasty to the medial tibial  Plateau, DOS: 92/22/12      Manuals 1/17 1/23 2/1 2/6 2/15        L knee PROM  SC   Extension NV        L gastroc, hip flexor, h/s str    SC   NV        Gr  II-IV pat mobs     NV        Reassessment     GR        Neuro Re-Ed             Alejandra: TKE   7# 20x5" 7# 20x5"         SAQ  2x 10            Supine SLR 2x10 2x 10    YTB Reviewed        Quad sets with heel prop 20x5" 20x 5" hold    Reviewed                                               Ther Ex             Patient education: pathophysiology, surgical overview, HEP review, graded activity GR    GR        Warm-up, cardiovascular conditioning  nuestp L4 10 min  Nustep LE L3 10 min Nustep LE L3 10 min Nustep L5 10 min        Ankle pumps HEP            Long-sitting calf str  3x30" 3x 30"  Prostretch 5x30"  Reviewed        Supine heel slides 20x10" 20x 5"  Reviewed          Hip abduction - sidelying     YTB reviewed        Prone quad str     5x30"  Reviewed        Standing h/s curl   3# 3x10 3x 10 3#          Prostretch   5x30"          HR             Standing hip abd, ext with TB             LAQ   3# 2x10 3# 2x 10          Matrix knee flexion     NV        Matrix knee extension     NV                     Ther Activity             Total gym: bilateral squats   L22 3x10 L22 3x 10  NV        FSU   8" 2x10 8" 2x 10          FSD   6" 2x10 6" 2x 10  NV        LSD     NV                                  Gait Training                                       Modalities             NMES     NV

## 2023-02-15 NOTE — LETTER
2023    Sherly Rdz DO  [address] [place] [employer]  [address] [place]    Patient: Abilio Anaya   YOB: 1965   Date of Visit: 2/15/2023     Encounter Diagnosis     ICD-10-CM    1  Acute pain of left knee  M25 562           Dear Dr Brian Waggoner: Thank you for your recent referral of Abilio Anaya  Please review the attached evaluation summary from Leah's recent visit  Please verify that you agree with the plan of care by signing the attached order  If you have any questions or concerns, please do not hesitate to call  I sincerely appreciate the opportunity to share in the care of one of your patients and hope to have another opportunity to work with you in the near future  Sincerely,    Mariana Landau, PT      Referring Provider:      I certify that I have read the below Plan of Care and certify the need for these services furnished under this plan of treatment while under my care  Sherlysa Kendell DO  [address] [place] [address] [facility]  [address] [address]  Via Fax: [phone]          PT Evaluation     Today's date: 2/15/2023  Patient name: Abilio Anaya  : 1965  MRN: [id number]  Referring provider: Orville Tee DO  Dx:   Encounter Diagnosis     ICD-10-CM    1  Acute pain of left knee  M25 562           Start Time: 1619  Stop Time: 1720  Total time in clinic (min): 61 minutes    Assessment  Assessment details: Abilio Anaya is a 61 y o  female who is currently approximately 6 weeks s/p arthroscopy of the L knee with partial medial and lateral menisectomies, partial synovectomy, chondroplasties of the medial femoral condyle and patella, subchondroplasty to the medial tibial  Plateau, DOS: 85/39/49, and has attended a total of 5 PT appointments to date  Patient has maintained fair compliance with established POC and has made gradual progress towards goals   Patient presents with decreased pain, improved knee flexion ROM, increased strength and improved functional mobility  Despite these improvements she continues to present with joint effusion, decreased knee extension ROM, decreased hip girdle strength, fair quadriceps activation and patellofemoral hypomobility with pain/crepitus  Due to these impairments, Patient has difficulty performing a/iadls, recreational activities and engaging in social activities  Patient would benefit from home neuromuscular electrical stimulation unit secondary to fair quadriceps tone and weakness  Patient would benefit from skilled physical therapy to address their aforementioned impairments, improve their level of function and to improve their overall quality of life  Impairments: abnormal muscle firing, abnormal muscle tone, abnormal or restricted ROM, activity intolerance, impaired physical strength, lacks appropriate home exercise program, pain with function, weight-bearing intolerance and poor body mechanics  Understanding of Dx/Px/POC: excellent   Prognosis: good    Goals  Short Term Goals: to be achieved by 4 weeks  1) Patient to be independent with basic HEP  MET  2) Decrease pain by 3/10 at its worst  PROGRESSED, BUT NOT MET  3) Increase LE strength by 1/2 MMT grade in all deficient planes  MET  4) Patient to negotiate steps with a reciprocal pattern with use of HR  MET  5) Patient to report decreased sleep interruption secondary to pain  MET  6) Increase ambulatory tolerance by 10 min with LRAD  MET    Long Term Goals: to be achieved by discharge ALL GOALS PROGRESSING  1) FOTO equal to or greater than TBD  2) Patient to be independent with comprehensive HEP  3) Abolish pain for improved quality of life  4) Increase LE strength to 5/5 MMT grade in all planes to improve a/iadls  5) Achieve full knee extension ROM to improve a/iadls  6) Increase knee flexion ROM to within 5 deg of contralateral LE to improve a/iadls  7) Patient to negotiate steps with a reciprocal pattern without use of Hr    8) Increase ambulatory tolerance to 60 min to improve participation in social activities  9) Patient to report no sleep interruption secondary to pain  Plan  Patient would benefit from: skilled PT  Planned modality interventions: biofeedback, cryotherapy, electrical stimulation/Russian stimulation, low level laser therapy and TENS  Planned therapy interventions: activity modification, ADL retraining, ADL training, balance, balance/weight bearing training, body mechanics training, dressing changes, functional ROM exercises, gait training, home exercise program, IADL retraining, joint mobilization, manual therapy, massage, neuromuscular re-education, patient education, self care, strengthening, stretching, therapeutic activities, therapeutic exercise and transfer training  Frequency: 1-3x week  Duration in weeks: 6  Plan of Care beginning date: 2/15/2023  Plan of Care expiration date: 3/29/2023  Treatment plan discussed with: patient        Subjective Evaluation    History of Present Illness  Mechanism of injury: Patient reports that overall her left knee pain has significantly improved and she is generally more mobile  Patient continues to be unable to kneel and compensates when squatting d/t weakness and fear avoidance  Patient continues to be challenged with negotiating steps  Patient has been able to increase her ambulation to 1 hour, but needs to rest after  Patient has noticed increased stiffness after prolonged sitting  Patient feels that her post-operative swelling remains the same, but her knee is starting to feel more stiff and swollen in the back d/t her Baker's cyst    Pain  Current pain rating: 3  At best pain ratin  At worst pain ratin  Location: left knee anterior  Alleviating factors: Tylenol PRN, ice, rest   Exacerbated by: prolonged sitting, negotiating steps, driving, prolonged walking, kneeling      Social Support    Employment status: working ([employer] - working strictly from home)  Treatments  Current treatment: physical therapy  Patient Goals  Patient goals for therapy: decreased pain, increased motion, increased strength, independence with ADLs/IADLs, return to sport/leisure activities, return to work and decreased edema          Objective     Observations   Left Knee   Positive for effusion and incision (Clean, well-approximated, no signs of infection)  Tenderness   Left Knee   Tenderness in the lateral joint line, lateral patella, medial joint line, medial patella and popliteal fossa  Active Range of Motion   Left Knee   Flexion: 130 degrees with pain  Extension: 3 degrees     Right Knee   Flexion: 130 degrees   Extension: 0 degrees     Passive Range of Motion   Left Knee   Flexion: 132 degrees with pain  Extension: 2 degrees     Right Knee   Flexion: 133 degrees   Extension: 0 degrees     Mobility   Patellar Mobility:   Left Knee   WFL: medial and lateral    Hypomobile: left superior and left inferior    Right Knee   WFL: medial, lateral, superior and inferior    Patellar Mobility Comments   Left superior tendon comments: (+) crepitus  Left inferior tendon comments: (+) crepitus       Strength/Myotome Testing     Left Hip   Planes of Motion   Flexion: 5  Extension: 5  Abduction: 4+    Right Hip   Planes of Motion   Flexion: 5  Extension: 5  Abduction: 5    Left Knee   Flexion: 5  Extension: 5  Quadriceps contraction: fair (able to perform supine SLR without extension lag)    Right Knee   Flexion: 5  Extension: 5  Quadriceps contraction: good    Left Ankle/Foot   Dorsiflexion: 5    Right Ankle/Foot   Dorsiflexion: 5    Ambulation   Weight-Bearing Status   Weight-Bearing Status (Left): full weight bearing   Weight-Bearing Status (Right): full weight-bearing    Assistive device used: none    Ambulation: Level Surfaces   Ambulation without assistive device: independent    Observational Gait   Gait: within functional limits     Functional Assessment      Squat    Left within functional limits and pain  Forward Step Up 8"   Left Leg  Within functional limits  Right Leg  Within functional limits  Comments  Forward Step Down 8" step: Mod I with unilateral HR, fair eccentric contrl, (+) pain            Precautions: s/p arthroscopy of the L knee with partial medial and lateral menisectomies, partial synovectomy, chondroplasties of the medial femoral condyle and patella, subchondroplasty to the medial tibial  Plateau, DOS: 01/69/32      Manuals 1/17 1/23 2/1 2/6 2/15        L knee PROM  SC   Extension NV        L gastroc, hip flexor, h/s str  SC   NV        Gr  II-IV pat mobs     NV        Reassessment     GR        Neuro Re-Ed             Alejandra: TKE   7# 20x5" 7# 20x5"         SAQ  2x 10            Supine SLR 2x10 2x 10    YTB Reviewed        Quad sets with heel prop 20x5" 20x 5" hold    Reviewed                                               Ther Ex             Patient education: pathophysiology, surgical overview, HEP review, graded activity GR    GR        Warm-up, cardiovascular conditioning  nuestp L4 10 min  Nustep LE L3 10 min Nustep LE L3 10 min Nustep L5 10 min        Ankle pumps HEP            Long-sitting calf str  3x30" 3x 30"  Prostretch 5x30"  Reviewed        Supine heel slides 20x10" 20x 5"  Reviewed          Hip abduction - sidelying     YTB reviewed        Prone quad str     5x30"  Reviewed        Standing h/s curl   3# 3x10 3x 10 3#          Prostretch   5x30"          HR             Standing hip abd, ext with TB             LAQ   3# 2x10 3# 2x 10          Matrix knee flexion     NV        Matrix knee extension     NV                     Ther Activity             Total gym: bilateral squats   L22 3x10 L22 3x 10  NV        FSU   8" 2x10 8" 2x 10          FSD   6" 2x10 6" 2x 10  NV        LSD     NV                                  Gait Training                                       Modalities             NMES     NV

## 2023-02-20 ENCOUNTER — OFFICE VISIT (OUTPATIENT)
Dept: PHYSICAL THERAPY | Facility: CLINIC | Age: 58
End: 2023-02-20

## 2023-02-20 DIAGNOSIS — M25.562 ACUTE PAIN OF LEFT KNEE: Primary | ICD-10-CM

## 2023-02-20 NOTE — PROGRESS NOTES
Daily Note     Today's date: 2023  Patient name: Kiki Walton  : 1965  MRN: [id number]  Referring provider: Kieran Akhtar DO  Dx:   Encounter Diagnosis     ICD-10-CM    1  Acute pain of left knee  M25 562                      Subjective: Pt noted that she does have some pain and soreness in his L knee  Pt noted about a 5/10  Pt reported her next step is to try injections for her L knee but noted they have no been approved by her insurance company at this time  Swelling noted to be still present in her L knee  Objective: See treatment diary below      Assessment: Continued with treatment session, Tolerated treatment well  Patient demonstrated fatigue post treatment, exhibited good technique with therapeutic exercises and would benefit from continued PT  S/P treatment session, pt noted feeling good but having some soreness in her L knee  Education;   - HEP - compliant at this at an every other day basis  - DOMS - 24 to 48 hours of soreness s/p treatment session    - CP education 10 - 20 min on and 60 minutes off  (at least 3 to 4 times a day - pt noted using at this time)    Plan: Continue per plan of care  Precautions: s/p arthroscopy of the L knee with partial medial and lateral menisectomies, partial synovectomy, chondroplasties of the medial femoral condyle and patella, subchondroplasty to the medial tibial  Plateau, DOS: 80/18/64      Manuals 1/17 1/23 2/1 2/6 2/15 2/20       L knee PROM  SC   Extension NV SC       L gastroc, hip flexor, h/s str    SC   NV SC       Gr  II-IV pat mobs     NV PROM of patella       Reassessment     GR                                                            Neuro Re-Ed             Alejandra: TKE   7# 20x5" 7# 20x5"         SAQ  2x 10            Supine SLR 2x10 2x 10    YTB Reviewed        Quad sets with heel prop 20x5" 20x 5" hold    Reviewed                                               Ther Ex             Patient education: pathophysiology, surgical overview, HEP review, graded activity GR    GR        Warm-up, cardiovascular conditioning  nuestp L4 10 min  Nustep LE L3 10 min Nustep LE L3 10 min Nustep L5 10 min Nustep L5 10 min        Ankle pumps HEP            Long-sitting calf str  3x30" 3x 30"  Prostretch 5x30"  Reviewed        Supine heel slides 20x10" 20x 5"  Reviewed          Hip abduction - sidelying     YTB reviewed        Prone quad str     5x30"  Reviewed        Standing h/s curl   3# 3x10 3x 10 3#          Prostretch   5x30"          HR             Standing hip abd, ext with TB      YTB trial        LAQ   3# 2x10 3# 2x 10          Matrix knee flexion     NV 10# 2x 10        Matrix knee extension     NV 10# 2x10                     Ther Activity             Total gym: bilateral squats   L22 3x10 L22 3x 10  NV L22 3x 10        FSU   8" 2x10 8" 2x 10          FSD   6" 2x10 6" 2x 10  NV 6" 2 x 10        LSD     NV 4" 2x 10                                  Gait Training                                       Modalities             NMES     NV L2 - 10 min 10sec on 50 sec off 2" concetta

## 2023-02-22 ENCOUNTER — OFFICE VISIT (OUTPATIENT)
Dept: PHYSICAL THERAPY | Facility: CLINIC | Age: 58
End: 2023-02-22

## 2023-02-22 DIAGNOSIS — M25.562 ACUTE PAIN OF LEFT KNEE: Primary | ICD-10-CM

## 2023-02-22 NOTE — PROGRESS NOTES
Daily Note     Today's date: 2023  Patient name: Dayo Boudreaux  : 1965  MRN: [id number]  Referring provider: Killian Ruiz DO  Dx:   Encounter Diagnosis     ICD-10-CM    1  Acute pain of left knee  M25 562           Start Time: 1559  Stop Time: 1655  Total time in clinic (min): 56 minutes    Subjective: Patient reports that she had increased soreness following her previous treatment session, but no more pain  Objective: See treatment diary below      Assessment: Tolerated treatment well  Patient demonstrated fatigue post treatment and would benefit from continued PT  Patient with improved knee ROM into both flexion and extension  Plan: Continue per plan of care  Progress treatment as tolerated  Precautions: s/p arthroscopy of the L knee with partial medial and lateral menisectomies, partial synovectomy, chondroplasties of the medial femoral condyle and patella, subchondroplasty to the medial tibial  Plateau, DOS: 76/49/07      Manuals 1/17 1/23 2/1 2/6 2/15 2/20 2/22      L knee PROM  SC   Extension NV SC GR      L gastroc, hip flexor, h/s str  SC   NV SC GR      Gr  II-IV pat mobs     NV PROM of patella GR      Reassessment     GR                                                            Neuro Re-Ed             Alejandra: TKE   7# 20x5" 7# 20x5"   10# 20x5"      SAQ  2x 10            Supine SLR 2x10 2x 10    YTB Reviewed        Quad sets with heel prop 20x5" 20x 5" hold    Reviewed                                               Ther Ex             Patient education: pathophysiology, surgical overview, HEP review, graded activity GR    GR        Warm-up, cardiovascular conditioning  nuestp L4 10 min  Nustep LE L3 10 min Nustep LE L3 10 min Nustep L5 10 min Nustep L5 10 min  Nustep L5 10 min      Ankle pumps HEP            Long-sitting calf str   3x30" 3x 30"  Prostretch 5x30"  Reviewed        Supine heel slides 20x10" 20x 5"  Reviewed          Hip abduction - sidelying     YTB reviewed Prone quad str     5x30"  Reviewed        Standing h/s curl   3# 3x10 3x 10 3#          Prostretch   5x30"    3x30"      HR             Standing hip abd, ext with TB      YTB trial        LAQ   3# 2x10 3# 2x 10          Matrix knee flexion     NV 10# 2x 10  20# 2x10      Matrix knee extension     NV 10# 2x10  20# 2x10                   Ther Activity             Total gym: bilateral squats   L22 3x10 L22 3x 10  NV L22 3x 10  L22 3x10      FSU   8" 2x10 8" 2x 10          FSD   6" 2x10 6" 2x 10  NV 6" 2 x 10        LSD     NV 4" 2x 10  4" 2x10      Total gym: single leg squats       L16 2x10                   Gait Training                                       Modalities             NMES     NV L2 - 10 min 10sec on 50 sec off 2" concetta

## 2023-02-24 ENCOUNTER — TELEPHONE (OUTPATIENT)
Dept: FAMILY MEDICINE CLINIC | Facility: CLINIC | Age: 58
End: 2023-02-24

## 2023-02-24 NOTE — TELEPHONE ENCOUNTER
PCP office received paperwork for patient to receive a TENS unit from [facility]  PCP feels it should come from ortho  Please advise  Forms given Annel 2/24  Thanks!

## 2023-02-25 NOTE — TELEPHONE ENCOUNTER
Not sure, I haven't seen her since October for her knee  They could fax the paperwork over and I can take a look

## 2023-02-27 ENCOUNTER — APPOINTMENT (OUTPATIENT)
Dept: PHYSICAL THERAPY | Facility: CLINIC | Age: 58
End: 2023-02-27

## 2023-02-28 ENCOUNTER — OFFICE VISIT (OUTPATIENT)
Dept: PHYSICAL THERAPY | Facility: CLINIC | Age: 58
End: 2023-02-28

## 2023-02-28 DIAGNOSIS — M25.562 ACUTE PAIN OF LEFT KNEE: Primary | ICD-10-CM

## 2023-02-28 NOTE — PROGRESS NOTES
Daily Note     Today's date: 2023  Patient name: Radha Sherman  : 1965  MRN: [id number]  Referring provider: Fadi Srinivasan DO  Dx:   Encounter Diagnosis     ICD-10-CM    1  Acute pain of left knee  M25 562           Start Time: 1559  Stop Time: 1710  Total time in clinic (min): 71 minutes    Subjective: Patient reports that she had mild soreness following her previous treatment session  Objective: See treatment diary below      Assessment: Tolerated treatment well  Patient demonstrated fatigue post treatment and would benefit from continued PT  Distributed and set up home NMES unit for quadriceps neuromuscular reeducation  Patient demonstrated independence and understanding of use  All questions answered to satisfaction of patient  Plan: Continue per plan of care  Progress treatment as tolerated  Precautions: s/p arthroscopy of the L knee with partial medial and lateral menisectomies, partial synovectomy, chondroplasties of the medial femoral condyle and patella, subchondroplasty to the medial tibial  Plateau, DOS: 48/66/97      Manuals 1/17 1/23 2/1 2/6 2/15 2/20 2/22 2/28     L knee PROM  SC   Extension NV SC GR      L gastroc, hip flexor, h/s str    SC   NV SC GR      Gr  II-IV pat mobs     NV PROM of patella GR      Reassessment     GR                                                            Neuro Re-Ed             [place]: TKE   7# 20x5" 7# 20x5"   10# 20x5" 10# 20x5"     SAQ  2x 10            Supine SLR 2x10 2x 10    YTB Reviewed        Quad sets with heel prop 20x5" 20x 5" hold    Reviewed        NMES home set up        10 min                               Ther Ex             Patient education: pathophysiology, surgical overview, HEP review, graded activity GR    GR        Warm-up, cardiovascular conditioning  nuestp L4 10 min  Nustep LE L3 10 min Nustep LE L3 10 min Nustep L5 10 min Nustep L5 10 min  Nustep L5 10 min Nustep L6 10 min     Ankle pumps HEP            Long-sitting calf str  3x30" 3x 30"  Prostretch 5x30"  Reviewed        Supine heel slides 20x10" 20x 5"  Reviewed          Hip abduction - sidelying     YTB reviewed        Prone quad str     5x30"  Reviewed        Standing h/s curl   3# 3x10 3x 10 3#          Prostretch   5x30"    3x30" 3x30"     HR             Standing hip abd, ext with TB      YTB trial        LAQ   3# 2x10 3# 2x 10          Matrix knee flexion     NV 10# 2x 10  20# 2x10 30# 3x10     Matrix knee extension     NV 10# 2x10  20# 2x10 30# 3x10                  Ther Activity             Total gym: bilateral squats   L22 3x10 L22 3x 10  NV L22 3x 10  L22 3x10 L22 3x10     FSU   8" 2x10 8" 2x 10          FSD   6" 2x10 6" 2x 10  NV 6" 2 x 10        LSD     NV 4" 2x 10  4" 2x10 4" 3x10     Total gym: single leg squats       L16 2x10 L16 3x10     Stationary lunges             Gait Training                                       Modalities             NMES     NV L2 - 10 min 10sec on 50 sec off 2" concetta

## 2023-03-01 ENCOUNTER — OFFICE VISIT (OUTPATIENT)
Dept: PHYSICAL THERAPY | Facility: CLINIC | Age: 58
End: 2023-03-01

## 2023-03-01 DIAGNOSIS — M25.562 ACUTE PAIN OF LEFT KNEE: Primary | ICD-10-CM

## 2023-03-01 NOTE — PROGRESS NOTES
Daily Note     Today's date: 3/1/2023  Patient name: Vijaya Love  : 1965  MRN: [id number]  Referring provider: Ifrah Garza DO  Dx:   Encounter Diagnosis     ICD-10-CM    1  Acute pain of left knee  M25 562           Start Time:   Stop Time:   Total time in clinic (min): 40 minutes    Subjective: Patient reports that she feels that the swelling in the back of her left knee is getting bigger and she has more tightness  Patient has a f/u appointment with her surgeon on Friday to receive the first round of viscosupplementation injections  Objective: See treatment diary below      Assessment: Tolerated treatment well  Patient demonstrated fatigue post treatment and would benefit from continued PT  Patient challenged with progressions reporting pain in popliteal space with lunges  Plan: Continue per plan of care  Progress treatment as tolerated  Precautions: s/p arthroscopy of the L knee with partial medial and lateral menisectomies, partial synovectomy, chondroplasties of the medial femoral condyle and patella, subchondroplasty to the medial tibial  Plateau, DOS: 72/92/33      Manuals 1/17 1/23 2/1 2/6 2/15 2/20 2/22 2/28 3/1    L knee PROM  SC   Extension NV SC GR      L gastroc, hip flexor, h/s str    SC   NV SC GR      Gr  II-IV pat mobs     NV PROM of patella GR      Reassessment     GR                                                            Neuro Re-Ed             Abbot: TKE   7# 20x5" 7# 20x5"   10# 20x5" 10# 20x5"     SAQ  2x 10            Supine SLR 2x10 2x 10    YTB Reviewed        Quad sets with heel prop 20x5" 20x 5" hold    Reviewed        NMES home set up        10 min                               Ther Ex             Patient education: pathophysiology, surgical overview, HEP review, graded activity GR    GR        Warm-up, cardiovascular conditioning  nuestp L4 10 min  Nustep LE L3 10 min Nustep LE L3 10 min Nustep L5 10 min Nustep L5 10 min  Nustep L5 10 min Nustep L6 10 min Nustep 5 min    Ankle pumps HEP            Long-sitting calf str  3x30" 3x 30"  Prostretch 5x30"  Reviewed        Supine heel slides 20x10" 20x 5"  Reviewed          Hip abduction - sidelying     YTB reviewed        Prone quad str  5x30"  Reviewed        Standing h/s curl   3# 3x10 3x 10 3#          Prostretch   5x30"    3x30" 3x30" 3x30"    HR             Standing hip abd, ext with TB      YTB trial    In SLS off 2" step    GTB 20x ea  b/l'    LAQ   3# 2x10 3# 2x 10          Matrix knee flexion     NV 10# 2x 10  20# 2x10 30# 3x10     Matrix knee extension     NV 10# 2x10  20# 2x10 30# 3x10     Sidestepping TB         GTB 2 laps    Hip abduction clock         GTB 2x5 ea      Ther Activity             Total gym: bilateral squats   L22 3x10 L22 3x 10  NV L22 3x 10  L22 3x10 L22 3x10 L22 3x10    FSU   8" 2x10 8" 2x 10          FSD   6" 2x10 6" 2x 10  NV 6" 2 x 10        LSD     NV 4" 2x 10  4" 2x10 4" 3x10     Total gym: single leg squats       L16 2x10 L16 3x10 L18 3x10    Stationary lunges         2x10    Gait Training                                       Modalities             NMES     NV L2 - 10 min 10sec on 50 sec off 2" concetta

## 2023-03-08 ENCOUNTER — OFFICE VISIT (OUTPATIENT)
Dept: PHYSICAL THERAPY | Facility: CLINIC | Age: 58
End: 2023-03-08

## 2023-03-08 DIAGNOSIS — M25.562 ACUTE PAIN OF LEFT KNEE: Primary | ICD-10-CM

## 2023-03-08 NOTE — PROGRESS NOTES
Daily Note     Today's date: 3/8/2023  Patient name: Avani Ramirez  : 1965  MRN: [id number]  Referring provider: Melina Ham DO  Dx:   Encounter Diagnosis     ICD-10-CM    1  Acute pain of left knee  M25 562           Start Time: 1602  Stop Time: 1650  Total time in clinic (min): 48 minutes    Subjective: Patient reports that she received her first round of viscosupplementation injections and had fluid aspirated from her knee  Patient reports no change in stiffness or pain in her knee following procedures  Objective: See treatment diary below      Assessment: Tolerated treatment well  Patient demonstrated fatigue post treatment and would benefit from continued PT  Patient's knee stiffness/pain remain unchanged following injection and withdrawal of fluid  Patient does demonstrate improved quality of movement overall  Plan: Continue per plan of care  Progress treatment as tolerated  Precautions: s/p arthroscopy of the L knee with partial medial and lateral menisectomies, partial synovectomy, chondroplasties of the medial femoral condyle and patella, subchondroplasty to the medial tibial  Plateau, DOS: 78/39/05      Manuals 1/17 1/23 2/1 2/6 2/15 2/20 2/22 2/28 3/1 3/8   L knee PROM  SC   Extension NV SC GR      L gastroc, hip flexor, h/s str    SC   NV SC GR      Gr  II-IV pat mobs     NV PROM of patella GR      Reassessment     GR                                                            Neuro Re-Ed             [place]: TKE   7# 20x5" 7# 20x5"   10# 20x5" 10# 20x5"     SAQ  2x 10            Supine SLR 2x10 2x 10    YTB Reviewed        Quad sets with heel prop 20x5" 20x 5" hold    Reviewed        NMES home set up        10 min                               Ther Ex             Patient education: pathophysiology, surgical overview, HEP review, graded activity GR    GR        Warm-up, cardiovascular conditioning  nuestp L4 10 min  Nustep LE L3 10 min Nustep LE L3 10 min Nustep L5 10 min Nustep L5 10 min  Nustep L5 10 min Nustep L6 10 min Nustep 5 min Nustep 10 min   Ankle pumps HEP            Long-sitting calf str  3x30" 3x 30"  Prostretch 5x30"  Reviewed        Supine heel slides 20x10" 20x 5"  Reviewed          Hip abduction - sidelying     YTB reviewed        Prone quad str  5x30"  Reviewed        Standing h/s curl   3# 3x10 3x 10 3#          Prostretch   5x30"    3x30" 3x30" 3x30"    HR             Standing hip abd, ext with TB      YTB trial    In SLS off 2" step    GTB 20x ea  b/l' In SLS off 2" step GTB 20x ea  b/l   LAQ   3# 2x10 3# 2x 10          Matrix knee flexion     NV 10# 2x 10  20# 2x10 30# 3x10  30# 3x10   Matrix knee extension     NV 10# 2x10  20# 2x10 30# 3x10  30# 3x10   Sidestepping TB         GTB 2 laps GTB 2 laps   Hip abduction clock         GTB 2x5 ea  GTB 2x5 ea     Ther Activity             Total gym: bilateral squats   L22 3x10 L22 3x 10  NV L22 3x 10  L22 3x10 L22 3x10 L22 3x10 L22 3x10   FSU   8" 2x10 8" 2x 10          FSD   6" 2x10 6" 2x 10  NV 6" 2 x 10        LSD     NV 4" 2x 10  4" 2x10 4" 3x10     Total gym: single leg squats       L16 2x10 L16 3x10 L18 3x10 L22 3x10   Stationary lunges         2x10    Gait Training                                       Modalities             NMES     NV L2 - 10 min 10sec on 50 sec off 2" concetta

## 2023-03-13 ENCOUNTER — APPOINTMENT (OUTPATIENT)
Dept: PHYSICAL THERAPY | Facility: CLINIC | Age: 58
End: 2023-03-13

## 2023-03-15 ENCOUNTER — APPOINTMENT (OUTPATIENT)
Dept: PHYSICAL THERAPY | Facility: CLINIC | Age: 58
End: 2023-03-15

## 2023-03-15 DIAGNOSIS — M25.562 ACUTE PAIN OF LEFT KNEE: Primary | ICD-10-CM

## 2023-04-07 DIAGNOSIS — E78.5 HYPERLIPIDEMIA, UNSPECIFIED HYPERLIPIDEMIA TYPE: ICD-10-CM

## 2023-04-07 DIAGNOSIS — E11.9 TYPE 2 DIABETES MELLITUS WITHOUT COMPLICATION, WITHOUT LONG-TERM CURRENT USE OF INSULIN (HCC): ICD-10-CM

## 2023-04-07 RX ORDER — ROSUVASTATIN CALCIUM 10 MG/1
TABLET, COATED ORAL
Qty: 30 TABLET | Refills: 0 | Status: SHIPPED | OUTPATIENT
Start: 2023-04-07

## 2023-04-07 RX ORDER — LISINOPRIL 2.5 MG/1
TABLET ORAL
Qty: 90 TABLET | Refills: 0 | Status: SHIPPED | OUTPATIENT
Start: 2023-04-07

## 2023-04-28 DIAGNOSIS — E78.5 HYPERLIPIDEMIA, UNSPECIFIED HYPERLIPIDEMIA TYPE: ICD-10-CM

## 2023-04-28 DIAGNOSIS — E11.9 TYPE 2 DIABETES MELLITUS WITHOUT COMPLICATION, WITHOUT LONG-TERM CURRENT USE OF INSULIN (HCC): Primary | ICD-10-CM

## 2023-05-08 DIAGNOSIS — E78.5 HYPERLIPIDEMIA, UNSPECIFIED HYPERLIPIDEMIA TYPE: ICD-10-CM

## 2023-05-08 RX ORDER — ROSUVASTATIN CALCIUM 10 MG/1
TABLET, COATED ORAL
Qty: 30 TABLET | Refills: 0 | Status: SHIPPED | OUTPATIENT
Start: 2023-05-08

## 2023-05-25 DIAGNOSIS — E11.9 TYPE 2 DIABETES MELLITUS WITHOUT COMPLICATION, WITHOUT LONG-TERM CURRENT USE OF INSULIN (HCC): ICD-10-CM

## 2023-05-25 RX ORDER — DULAGLUTIDE 0.75 MG/.5ML
INJECTION, SOLUTION SUBCUTANEOUS
Qty: 3 ML | Refills: 0 | Status: SHIPPED | OUTPATIENT
Start: 2023-05-25

## 2023-06-22 DIAGNOSIS — E11.9 TYPE 2 DIABETES MELLITUS WITHOUT COMPLICATION, WITHOUT LONG-TERM CURRENT USE OF INSULIN (HCC): ICD-10-CM

## 2023-06-22 RX ORDER — DULAGLUTIDE 0.75 MG/.5ML
INJECTION, SOLUTION SUBCUTANEOUS
OUTPATIENT
Start: 2023-06-22

## 2023-06-22 NOTE — TELEPHONE ENCOUNTER
LM to call and schedule an appointment for follow up, she is overdue   Once we schedule an appt we can refill Trulicity

## 2023-06-28 DIAGNOSIS — E11.9 TYPE 2 DIABETES MELLITUS WITHOUT COMPLICATION, WITHOUT LONG-TERM CURRENT USE OF INSULIN (HCC): ICD-10-CM

## 2023-06-28 LAB
ALBUMIN SERPL-MCNC: 4.1 G/DL (ref 3.6–5.1)
ALBUMIN/CREAT UR: 5 MCG/MG CREAT
ALBUMIN/GLOB SERPL: 1.7 (CALC) (ref 1–2.5)
ALP SERPL-CCNC: 110 U/L (ref 37–153)
ALT SERPL-CCNC: 22 U/L (ref 6–29)
AST SERPL-CCNC: 20 U/L (ref 10–35)
BILIRUB SERPL-MCNC: 0.6 MG/DL (ref 0.2–1.2)
BUN SERPL-MCNC: 13 MG/DL (ref 7–25)
BUN/CREAT SERPL: ABNORMAL (CALC) (ref 6–22)
CALCIUM SERPL-MCNC: 9.1 MG/DL (ref 8.6–10.4)
CHLORIDE SERPL-SCNC: 106 MMOL/L (ref 98–110)
CHOLEST SERPL-MCNC: 199 MG/DL
CHOLEST/HDLC SERPL: 3.6 (CALC)
CO2 SERPL-SCNC: 27 MMOL/L (ref 20–32)
CREAT SERPL-MCNC: 0.69 MG/DL (ref 0.5–1.03)
CREAT UR-MCNC: 149 MG/DL (ref 20–275)
GFR/BSA.PRED SERPLBLD CYS-BASED-ARV: 100 ML/MIN/1.73M2
GLOBULIN SER CALC-MCNC: 2.4 G/DL (CALC) (ref 1.9–3.7)
GLUCOSE SERPL-MCNC: 134 MG/DL (ref 65–99)
HBA1C MFR BLD: 6.4 % OF TOTAL HGB
HDLC SERPL-MCNC: 55 MG/DL
LDLC SERPL CALC-MCNC: 110 MG/DL (CALC)
MICROALBUMIN UR-MCNC: 0.8 MG/DL
NONHDLC SERPL-MCNC: 144 MG/DL (CALC)
POTASSIUM SERPL-SCNC: 4.5 MMOL/L (ref 3.5–5.3)
PROT SERPL-MCNC: 6.5 G/DL (ref 6.1–8.1)
SODIUM SERPL-SCNC: 141 MMOL/L (ref 135–146)
TRIGL SERPL-MCNC: 218 MG/DL

## 2023-06-28 RX ORDER — BLOOD SUGAR DIAGNOSTIC
STRIP MISCELLANEOUS
Qty: 50 STRIP | Refills: 2 | Status: SHIPPED | OUTPATIENT
Start: 2023-06-28

## 2023-07-21 ENCOUNTER — APPOINTMENT (OUTPATIENT)
Dept: RADIOLOGY | Facility: MEDICAL CENTER | Age: 58
End: 2023-07-21
Payer: COMMERCIAL

## 2023-07-21 ENCOUNTER — OFFICE VISIT (OUTPATIENT)
Dept: FAMILY MEDICINE CLINIC | Facility: CLINIC | Age: 58
End: 2023-07-21
Payer: COMMERCIAL

## 2023-07-21 VITALS
SYSTOLIC BLOOD PRESSURE: 100 MMHG | OXYGEN SATURATION: 97 % | DIASTOLIC BLOOD PRESSURE: 62 MMHG | BODY MASS INDEX: 34.83 KG/M2 | WEIGHT: 204 LBS | HEART RATE: 84 BPM | HEIGHT: 64 IN | RESPIRATION RATE: 16 BRPM

## 2023-07-21 DIAGNOSIS — E11.9 TYPE 2 DIABETES MELLITUS WITHOUT COMPLICATION, WITHOUT LONG-TERM CURRENT USE OF INSULIN (HCC): ICD-10-CM

## 2023-07-21 DIAGNOSIS — E78.5 HYPERLIPIDEMIA, UNSPECIFIED HYPERLIPIDEMIA TYPE: ICD-10-CM

## 2023-07-21 DIAGNOSIS — M25.511 CHRONIC RIGHT SHOULDER PAIN: ICD-10-CM

## 2023-07-21 DIAGNOSIS — E11.9 TYPE 2 DIABETES MELLITUS WITHOUT COMPLICATION, WITHOUT LONG-TERM CURRENT USE OF INSULIN (HCC): Primary | ICD-10-CM

## 2023-07-21 DIAGNOSIS — G89.29 CHRONIC RIGHT SHOULDER PAIN: ICD-10-CM

## 2023-07-21 DIAGNOSIS — Z12.31 ENCOUNTER FOR SCREENING MAMMOGRAM FOR MALIGNANT NEOPLASM OF BREAST: ICD-10-CM

## 2023-07-21 DIAGNOSIS — E04.1 THYROID NODULE: ICD-10-CM

## 2023-07-21 PROCEDURE — 99214 OFFICE O/P EST MOD 30 MIN: CPT | Performed by: PHYSICIAN ASSISTANT

## 2023-07-21 PROCEDURE — 73030 X-RAY EXAM OF SHOULDER: CPT

## 2023-07-21 RX ORDER — DULAGLUTIDE 0.75 MG/.5ML
0.75 INJECTION, SOLUTION SUBCUTANEOUS
Qty: 3 ML | Refills: 3 | Status: SHIPPED | OUTPATIENT
Start: 2023-07-21 | End: 2023-07-26 | Stop reason: SDUPTHER

## 2023-07-21 RX ORDER — LISINOPRIL 2.5 MG/1
2.5 TABLET ORAL DAILY
Qty: 90 TABLET | Refills: 1 | Status: SHIPPED | OUTPATIENT
Start: 2023-07-21

## 2023-07-21 RX ORDER — ROSUVASTATIN CALCIUM 10 MG/1
10 TABLET, COATED ORAL DAILY
Qty: 90 TABLET | Refills: 1 | Status: SHIPPED | OUTPATIENT
Start: 2023-07-21

## 2023-07-21 NOTE — PROGRESS NOTES
BMI Counseling: Body mass index is 35.02 kg/m². The BMI is above normal. Nutrition recommendations include decreasing portion sizes, encouraging healthy choices of fruits and vegetables, decreasing fast food intake, moderation in carbohydrate intake, increasing intake of lean protein, reducing intake of saturated and trans fat and reducing intake of cholesterol. Exercise recommendations include exercising 3-5 times per week. No pharmacotherapy was ordered. Rationale for BMI follow-up plan is due to patient being overweight or obese. Depression Screening and Follow-up Plan: Patient was screened for depression during today's encounter. They screened negative with a PHQ-2 score of 0. Assessment/Plan:     Diagnoses and all orders for this visit:    Type 2 diabetes mellitus without complication, without long-term current use of insulin (HCC)  Comments:  Diabetes is at goal continue current regimen  Orders:  -     dulaglutide (Trulicity) 2.41 FH/4.2JZ injection; Inject 0.5 mL (0.75 mg total) under the skin every 7 days  -     lisinopril (ZESTRIL) 2.5 mg tablet; Take 1 tablet (2.5 mg total) by mouth daily    Hyperlipidemia, unspecified hyperlipidemia type  Comments:  Lipids at goal continue statin therapy and low-fat diet  Orders:  -     rosuvastatin (CRESTOR) 10 MG tablet; Take 1 tablet (10 mg total) by mouth daily    Encounter for screening mammogram for malignant neoplasm of breast  -     Mammo screening bilateral w 3d & cad; Future    Thyroid nodule  -     US thyroid; Future    Chronic right shoulder pain  Comments:  X-ray shoulder. Orders:  -     XR shoulder 2+ vw right; Future    Type 2 diabetes mellitus without complication, without long-term current use of insulin (HCC)  -     dulaglutide (Trulicity) 9.98 GT/5.9LG injection; Inject 0.5 mL (0.75 mg total) under the skin every 7 days  -     lisinopril (ZESTRIL) 2.5 mg tablet;  Take 1 tablet (2.5 mg total) by mouth daily    Hyperlipidemia, unspecified hyperlipidemia type  Comments: Will start statin therapy. Side effects reviewed with patient  Orders:  -     rosuvastatin (CRESTOR) 10 MG tablet; Take 1 tablet (10 mg total) by mouth daily    Type 2 diabetes mellitus without complication, without long-term current use of insulin (HCC)  Comments:  New onset of type 2 diabetes. Start metformin. Sent to diabetic education classes. Orders:  -     dulaglutide (Trulicity) 3.38 YZ/7.1TI injection; Inject 0.5 mL (0.75 mg total) under the skin every 7 days  -     lisinopril (ZESTRIL) 2.5 mg tablet; Take 1 tablet (2.5 mg total) by mouth daily          Subjective:      Patient ID: Mark Norman is a 60 y.o. female. Patient presents in the office for follow-up chronic conditions. Patient has not insulin diabetes mellitus type 2. Current regimen includes Trulicity 2.68 mg weekly and lisinopril 2.5 mg for renal protection. Current A1c is 6.4. Fasting blood sugar was 134. Urine microalbumin was negative. Checks her blood sugar daily. Has been off her diet recently due to a death in the family. Hyperlipidemia she is on Crestor 10 mg.  Migraines she uses Imitrex as needed. States her headaches are stable and she has barely had to use this medicine. Patient having chronic right shoulder pain. Patient states when she lifts her arm she gets excruciating pain into her shoulder upper neck and upper arm. No recent injury.       The following portions of the patient's history were reviewed and updated as appropriate:   She   Patient Active Problem List    Diagnosis Date Noted   • Primary osteoarthritis of left knee 10/18/2022   • Allergic rhinitis 04/01/2020   • Screening for breast cancer 01/02/2019   • Bilateral thumb pain 01/02/2019   • Lipoma 09/29/2017   • Thyroid nodule 09/29/2017   • Type 2 diabetes mellitus without complication, without long-term current use of insulin ([address]) 02/07/2017   • Hyperlipidemia 02/07/2017   • Class 2 severe obesity due to excess calories with serious comorbidity in adult [facility]) 02/07/2017   • Common migraine without aura 08/03/2012     Current Outpatient Medications   Medication Sig Dispense Refill   • Blood Glucose Monitoring Suppl (OneTouch Verio Reflect) w/Device KIT Check blood sugars once daily. Please substitute with appropriate alternative as covered by patient's insurance. Dx: E11.65 1 kit 0   • dulaglutide (Trulicity) 5.63 FX/6.1SR injection Inject 0.5 mL (0.75 mg total) under the skin every 7 days 3 mL 3   • glucose blood (OneTouch Verio) test strip USE AS DIRECTED DAILY 50 strip 2   • lisinopril (ZESTRIL) 2.5 mg tablet Take 1 tablet (2.5 mg total) by mouth daily 90 tablet 1   • OneTouch Delica Lancets 65A MISC Check blood sugars once daily. Please substitute with appropriate alternative as covered by patient's insurance. Dx: E11.65 100 each 3   • rosuvastatin (CRESTOR) 10 MG tablet Take 1 tablet (10 mg total) by mouth daily 90 tablet 1   • SUMAtriptan (IMITREX) 100 mg tablet Take 1 tablet (100 mg total) by mouth once as needed for migraine for up to 1 dose (Patient not taking: Reported on 7/21/2023) 9 tablet 0     No current facility-administered medications for this visit. She has No Known Allergies. .    Review of Systems   Constitutional: Negative for activity change and unexpected weight change. HENT: Negative for ear pain and sore throat. Eyes: Negative for visual disturbance. Respiratory: Negative for cough, shortness of breath and wheezing. Cardiovascular: Negative for chest pain and leg swelling. Gastrointestinal: Negative for abdominal pain, blood in stool, constipation, diarrhea, nausea and vomiting. Genitourinary: Negative for difficulty urinating. Musculoskeletal: Positive for arthralgias. Negative for myalgias. Right  Shoulder pain  c hronic     Skin: Negative for rash. Neurological: Negative for dizziness, syncope, light-headedness and headaches.    Psychiatric/Behavioral: Negative for self-injury, sleep disturbance and suicidal ideas. The patient is not nervous/anxious. Objective:        Physical Exam  Vitals and nursing note reviewed. Constitutional:       General: She is not in acute distress. Appearance: She is well-developed. She is not ill-appearing or diaphoretic. HENT:      Head: Normocephalic and atraumatic. Right Ear: Tympanic membrane, ear canal and external ear normal.      Left Ear: Tympanic membrane, ear canal and external ear normal.      Mouth/Throat:      Pharynx: No posterior oropharyngeal erythema. Eyes:      Conjunctiva/sclera: Conjunctivae normal.      Pupils: Pupils are equal, round, and reactive to light. Neck:      Thyroid: No thyromegaly. Vascular: No carotid bruit. Cardiovascular:      Rate and Rhythm: Normal rate and regular rhythm. Pulses: no weak pulses          Dorsalis pedis pulses are 2+ on the right side and 2+ on the left side. Heart sounds: Normal heart sounds. No murmur heard. No friction rub. No gallop. Pulmonary:      Effort: Pulmonary effort is normal. No respiratory distress. Breath sounds: Normal breath sounds. No wheezing. Abdominal:      General: Bowel sounds are normal. There is no distension. Palpations: Abdomen is soft. There is no mass. Tenderness: There is no abdominal tenderness. Musculoskeletal:      Right lower leg: No edema. Left lower leg: No edema. Comments: Right shoulder nontender to palpation. Pain with abduction. Strength intact to passive resistance   Feet:      Right foot:      Skin integrity: No ulcer, skin breakdown, erythema, warmth, callus or dry skin. Left foot:      Skin integrity: No ulcer, skin breakdown, erythema, warmth, callus or dry skin. Lymphadenopathy:      Cervical: No cervical adenopathy. Skin:     General: Skin is warm and dry. Findings: No erythema or rash. Neurological:      General: No focal deficit present.       Mental Status: She is alert and oriented to person, place, and time. Psychiatric:         Attention and Perception: Attention normal.         Mood and Affect: Mood normal. Affect is tearful. Behavior: Behavior normal.         Thought Content: Thought content normal.         Judgment: Judgment normal.      Diabetic Foot Exam    Patient's shoes and socks removed. Right Foot/Ankle   Right Foot Inspection  Skin Exam: skin normal and skin intact. No dry skin, no warmth, no callus, no erythema, no maceration, no abnormal color, no pre-ulcer, no ulcer and no callus. Toe Exam: right toe deformity. Sensory   Vibration: intact  Monofilament testing: intact    Vascular  The right DP pulse is 2+. Left Foot/Ankle  Left Foot Inspection  Skin Exam: skin normal and skin intact. No dry skin, no warmth, no erythema, no maceration, normal color, no pre-ulcer, no ulcer and no callus. Toe Exam: left toe deformity. Sensory   Vibration: intact  Monofilament testing: intact    Vascular  The left DP pulse is 2+.      Assign Risk Category  Deformity present  No loss of protective sensation  No weak pulses  Risk: 0

## 2023-07-26 DIAGNOSIS — E11.9 TYPE 2 DIABETES MELLITUS WITHOUT COMPLICATION, WITHOUT LONG-TERM CURRENT USE OF INSULIN (HCC): ICD-10-CM

## 2023-07-26 RX ORDER — DULAGLUTIDE 0.75 MG/.5ML
0.75 INJECTION, SOLUTION SUBCUTANEOUS
Qty: 3 ML | Refills: 3 | Status: SHIPPED | OUTPATIENT
Start: 2023-07-26

## 2023-07-27 LAB
LEFT EYE DIABETIC RETINOPATHY: NORMAL
RIGHT EYE DIABETIC RETINOPATHY: NORMAL

## 2023-07-28 RX ORDER — DULAGLUTIDE 0.75 MG/.5ML
0.75 INJECTION, SOLUTION SUBCUTANEOUS
Refills: 3 | OUTPATIENT
Start: 2023-07-28

## 2023-08-01 ENCOUNTER — TELEPHONE (OUTPATIENT)
Dept: FAMILY MEDICINE CLINIC | Facility: CLINIC | Age: 58
End: 2023-08-01

## 2023-08-02 ENCOUNTER — PATIENT MESSAGE (OUTPATIENT)
Dept: FAMILY MEDICINE CLINIC | Facility: CLINIC | Age: 58
End: 2023-08-02

## 2023-08-02 DIAGNOSIS — G89.29 CHRONIC RIGHT SHOULDER PAIN: Primary | ICD-10-CM

## 2023-08-02 DIAGNOSIS — M25.511 CHRONIC RIGHT SHOULDER PAIN: Primary | ICD-10-CM

## 2023-08-16 ENCOUNTER — OFFICE VISIT (OUTPATIENT)
Dept: OBGYN CLINIC | Facility: CLINIC | Age: 58
End: 2023-08-16
Payer: COMMERCIAL

## 2023-08-16 VITALS
DIASTOLIC BLOOD PRESSURE: 80 MMHG | WEIGHT: 202 LBS | HEART RATE: 69 BPM | BODY MASS INDEX: 34.49 KG/M2 | SYSTOLIC BLOOD PRESSURE: 118 MMHG | HEIGHT: 64 IN

## 2023-08-16 DIAGNOSIS — M62.838 TRAPEZIUS MUSCLE SPASM: ICD-10-CM

## 2023-08-16 DIAGNOSIS — M75.41 SUBACROMIAL IMPINGEMENT OF RIGHT SHOULDER: ICD-10-CM

## 2023-08-16 DIAGNOSIS — M19.011 ARTHRITIS OF RIGHT ACROMIOCLAVICULAR JOINT: Primary | ICD-10-CM

## 2023-08-16 PROCEDURE — 99214 OFFICE O/P EST MOD 30 MIN: CPT | Performed by: FAMILY MEDICINE

## 2023-08-16 PROCEDURE — 20610 DRAIN/INJ JOINT/BURSA W/O US: CPT | Performed by: FAMILY MEDICINE

## 2023-08-16 RX ORDER — BUPIVACAINE HYDROCHLORIDE 2.5 MG/ML
4 INJECTION, SOLUTION INFILTRATION; PERINEURAL
Status: COMPLETED | OUTPATIENT
Start: 2023-08-16 | End: 2023-08-16

## 2023-08-16 RX ORDER — MELOXICAM 15 MG/1
15 TABLET ORAL DAILY
Qty: 30 TABLET | Refills: 1 | Status: SHIPPED | OUTPATIENT
Start: 2023-08-16

## 2023-08-16 RX ORDER — TRIAMCINOLONE ACETONIDE 40 MG/ML
40 INJECTION, SUSPENSION INTRA-ARTICULAR; INTRAMUSCULAR
Status: COMPLETED | OUTPATIENT
Start: 2023-08-16 | End: 2023-08-16

## 2023-08-16 RX ORDER — BUPIVACAINE HYDROCHLORIDE 2.5 MG/ML
1 INJECTION, SOLUTION INFILTRATION; PERINEURAL
Status: COMPLETED | OUTPATIENT
Start: 2023-08-16 | End: 2023-08-16

## 2023-08-16 RX ORDER — CYCLOBENZAPRINE HCL 10 MG
10 TABLET ORAL
Qty: 30 TABLET | Refills: 0 | Status: SHIPPED | OUTPATIENT
Start: 2023-08-16

## 2023-08-16 RX ADMIN — BUPIVACAINE HYDROCHLORIDE 4 ML: 2.5 INJECTION, SOLUTION INFILTRATION; PERINEURAL at 08:00

## 2023-08-16 RX ADMIN — TRIAMCINOLONE ACETONIDE 40 MG: 40 INJECTION, SUSPENSION INTRA-ARTICULAR; INTRAMUSCULAR at 08:00

## 2023-08-16 RX ADMIN — BUPIVACAINE HYDROCHLORIDE 1 ML: 2.5 INJECTION, SOLUTION INFILTRATION; PERINEURAL at 08:00

## 2023-08-16 NOTE — PROGRESS NOTES
Assessment/Plan:  Assessment/Plan   Diagnoses and all orders for this visit:    Arthritis of right acromioclavicular joint  -     Ambulatory Referral to Orthopedic Surgery  -     Cancel: Ambulatory Referral to Physical Therapy; Future  -     Ambulatory Referral to Physical Therapy; Future  -     meloxicam (Mobic) 15 mg tablet; Take 1 tablet (15 mg total) by mouth daily    Subacromial impingement of right shoulder  -     Cancel: Ambulatory Referral to Physical Therapy; Future  -     Ambulatory Referral to Physical Therapy; Future  -     Large joint arthrocentesis: R subacromial bursa    Trapezius muscle spasm  -     Cancel: Ambulatory Referral to Physical Therapy; Future  -     Ambulatory Referral to Physical Therapy; Future  -     cyclobenzaprine (FLEXERIL) 10 mg tablet; Take 1 tablet (10 mg total) by mouth daily at bedtime        39-year-old right-hand-dominant female with right shoulder pain more than few years duration. Discussed with patient physical exam, radiographs, impression, and plan. X-rays right shoulder for [facility] joint degenerative changes with inferior osteophytes. Physical exam cervical spine unremarkable for midline or paraspinal tenderness. She has intact range of motion cervical spine. Axial load and Spurling's are unremarkable. Right shoulder noted for mild intensity aspect. She has range of motion limited to forward flexion of 150 degrees, abduction 130 degrees, and internal rotation to sacrum. She has normal strength in rotator cuff. Empty can test is unremarkable. There is positive Rousseau. She has normal strength, sensation, bicep reflex, and radial pulse both upper extremities. Clinical impression is that she has symptoms from altered mechanics and AC arthritis causing impingement. I discussed treatment regimen of steroid injection, anti-inflammatory, muscle axis, and formal therapy. Surgery is not warranted at this time.   I administered mixture 3 cc 0.25% bupivacaine and 1 cc Kenalog to the right shoulder subacromial space without complication. She is to take meloxicam 15 mg once daily with food for 30 days and not to take ibuprofen or Aleve but may take Tylenol. She is to take cyclobenzaprine at night and not to take before driving. She is to consult with formal therapy to be provided home exercise program to do on regular basis. She will follow-up with me as needed. Subjective:   Patient ID: Megan Chan is a 60 y.o. female. Chief Complaint   Patient presents with   • Right Shoulder - Pain       41-year-old right-hand-dominant female presents evaluation of right shoulder pain more than few years duration. She denies any particular trauma or inciting event. Pain described as gradual in onset not generalized to the shoulder but worse at the lateral aspect and rating distally along the lateral aspect upper arm, worse with moving the arm particular elevating, and progressing. Symptoms were initially mild in intensity and she continued with activity despite having symptoms. Over past few months symptoms have worsened and she has limited range of motion particular abduction the arm. She also has difficulty laying on her right side. She has tried managing symptoms with Tylenol, ibuprofen, heat, and ice. She was seen by primary provider, referred for x-rays, and referred to orthopedic care. Shoulder Pain  This is a chronic problem. The current episode started more than 1 year ago. The problem occurs daily. The problem has been gradually worsening. Associated symptoms include arthralgias and neck pain. Pertinent negatives include no abdominal pain, chest pain, chills, fever, joint swelling, numbness, rash, sore throat or weakness. Exacerbated by: Arm elevation. She has tried position changes, NSAIDs, heat, ice and acetaminophen for the symptoms. The treatment provided mild relief.            The following portions of the patient's history were reviewed and updated as appropriate: She  has a past medical history of Hyperlipidemia, Migraine, and Seasonal allergies. She has No Known Allergies. .    Review of Systems   Constitutional: Negative for chills and fever. HENT: Negative for sore throat. Eyes: Negative for visual disturbance. Respiratory: Negative for shortness of breath. Cardiovascular: Negative for chest pain. Gastrointestinal: Negative for abdominal pain. Genitourinary: Negative for flank pain. Musculoskeletal: Positive for arthralgias and neck pain. Negative for joint swelling. Skin: Negative for rash and wound. Neurological: Negative for weakness and numbness. Hematological: Does not bruise/bleed easily. Psychiatric/Behavioral: Negative for self-injury. Objective:  Vitals:    08/16/23 0802   BP: 118/80   Pulse: 69   Weight: 91.6 kg (202 lb)   Height: 5' 4" (1.626 m)     Back Exam     Comments:    Cervical spine  - No tenderness  - Normal range of motion  - Negative axial load  - Negative Spurling's  - Normal strength, sensation, bicep reflex both upper extremities      Right Hand Exam     Muscle Strength   The patient has normal right wrist strength. Other   Sensation: normal  Pulse: present      Left Hand Exam     Muscle Strength   The patient has normal left wrist strength. Other   Sensation: normal  Pulse: present      Right Elbow Exam     Tenderness   The patient is experiencing no tenderness. Range of Motion   The patient has normal right elbow ROM. Muscle Strength   The patient has normal right elbow strength (5/5 flexion and extension). Other   Sensation: normal      Left Elbow Exam     Other   Sensation: normal      Right Shoulder Exam     Tenderness   Right shoulder tenderness location: Anterior.     Range of Motion   Active abduction: 130   Forward flexion: 150   Internal rotation 0 degrees: Sacrum     Muscle Strength   Abduction: 5/5   Internal rotation: 5/5   External rotation: 5/5   Supraspinatus: 5/5     Tests Rousseau test: positive    Other   Sensation: normal    Comments:  Negative empty can test      Left Shoulder Exam     Other   Sensation: normal           Strength/Myotome Testing     Left Wrist/Hand   Normal wrist strength    Right Wrist/Hand   Normal wrist strength      Physical Exam  Vitals and nursing note reviewed. Constitutional:       General: She is not in acute distress. Appearance: Normal appearance. She is well-developed. She is not ill-appearing or diaphoretic. HENT:      Head: Normocephalic and atraumatic. Right Ear: External ear normal.      Left Ear: External ear normal.   Eyes:      Conjunctiva/sclera: Conjunctivae normal.   Neck:      Trachea: No tracheal deviation. Cardiovascular:      Rate and Rhythm: Normal rate. Pulmonary:      Effort: Pulmonary effort is normal. No respiratory distress. Abdominal:      General: There is no distension. Musculoskeletal:         General: Tenderness present. No swelling, deformity or signs of injury. Skin:     General: Skin is warm and dry. Coloration: Skin is not jaundiced or pale. Neurological:      Mental Status: She is oriented to person, place, and time. Psychiatric:         Mood and Affect: Mood normal.         Behavior: Behavior normal.         Thought Content: Thought content normal.         Judgment: Judgment normal.           I have personally reviewed pertinent films in PACS and my interpretation is AC arthropathy with downward osteophyte. Large joint arthrocentesis: R subacromial bursa  Universal Protocol:  Consent: Verbal consent obtained. Risks and benefits: risks, benefits and alternatives were discussed  Consent given by: patient  Time out: Immediately prior to procedure a "time out" was called to verify the correct patient, procedure, equipment, support staff and site/side marked as required.   Patient understanding: patient states understanding of the procedure being performed  Patient consent: the patient's understanding of the procedure matches consent given  Procedure consent: procedure consent matches procedure scheduled  Relevant documents: relevant documents present and verified  Test results: test results available and properly labeled  Site marked: the operative site was marked  Radiology Images displayed and confirmed.  If images not available, report reviewed: imaging studies available  Required items: required blood products, implants, devices, and special equipment available  Patient identity confirmed: verbally with patient    Supporting Documentation  Indications: pain   Procedure Details  Location: shoulder - R subacromial bursa  Preparation: Patient was prepped and draped in the usual sterile fashion  Needle gauge: 21G 2"  Ultrasound guidance: no  Approach: lateral  Medications administered: 4 mL bupivacaine 0.25 %; 1 mL bupivacaine 0.25 %; 40 mg triamcinolone acetonide 40 mg/mL    Patient tolerance: patient tolerated the procedure well with no immediate complications  Dressing:  Sterile dressing applied

## 2023-08-16 NOTE — LETTER
August 16, 2023     Ilan Machado PA-C  [address]  [address] [facility]    Patient: Darin Cervantes   YOB: 1965   Date of Visit: 8/16/2023       Dear Dr. Riaz Travis:    Thank you for referring Roxanna Wilson to me for evaluation. Below are my notes for this consultation. If you have questions, please do not hesitate to call me. I look forward to following your patient along with you. Sincerely,        Yeison Vazquez DO        CC: No Recipients    MARVA Mendez DO  8/16/2023  9:02 AM  Sign when Signing Visit  Assessment/Plan:  Assessment/Plan   Diagnoses and all orders for this visit:    Arthritis of right acromioclavicular joint  -     Ambulatory Referral to Orthopedic Surgery  -     Cancel: Ambulatory Referral to Physical Therapy; Future  -     Ambulatory Referral to Physical Therapy; Future  -     meloxicam (Mobic) 15 mg tablet; Take 1 tablet (15 mg total) by mouth daily    Subacromial impingement of right shoulder  -     Cancel: Ambulatory Referral to Physical Therapy; Future  -     Ambulatory Referral to Physical Therapy; Future  -     Large joint arthrocentesis: R subacromial bursa    Trapezius muscle spasm  -     Cancel: Ambulatory Referral to Physical Therapy; Future  -     Ambulatory Referral to Physical Therapy; Future  -     cyclobenzaprine (FLEXERIL) 10 mg tablet; Take 1 tablet (10 mg total) by mouth daily at bedtime        54-year-old right-hand-dominant female with right shoulder pain more than few years duration. Discussed with patient physical exam, radiographs, impression, and plan. X-rays right shoulder for [facility] joint degenerative changes with inferior osteophytes. Physical exam cervical spine unremarkable for midline or paraspinal tenderness. She has intact range of motion cervical spine. Axial load and Spurling's are unremarkable. Right shoulder noted for mild intensity aspect.   She has range of motion limited to forward flexion of 150 degrees, abduction 130 degrees, and internal rotation to sacrum. She has normal strength in rotator cuff. Empty can test is unremarkable. There is positive Rousseau. She has normal strength, sensation, bicep reflex, and radial pulse both upper extremities. Clinical impression is that she has symptoms from altered mechanics and AC arthritis causing impingement. I discussed treatment regimen of steroid injection, anti-inflammatory, muscle axis, and formal therapy. Surgery is not warranted at this time. I administered mixture 3 cc 0.25% bupivacaine and 1 cc Kenalog to the right shoulder subacromial space without complication. She is to take meloxicam 15 mg once daily with food for 30 days and not to take ibuprofen or Aleve but may take Tylenol. She is to take cyclobenzaprine at night and not to take before driving. She is to consult with formal therapy to be provided home exercise program to do on regular basis. She will follow-up with me as needed. Subjective:   Patient ID: Melvin Barreto is a 60 y.o. female. Chief Complaint   Patient presents with   • Right Shoulder - Pain       50-year-old right-hand-dominant female presents evaluation of right shoulder pain more than few years duration. She denies any particular trauma or inciting event. Pain described as gradual in onset not generalized to the shoulder but worse at the lateral aspect and rating distally along the lateral aspect upper arm, worse with moving the arm particular elevating, and progressing. Symptoms were initially mild in intensity and she continued with activity despite having symptoms. Over past few months symptoms have worsened and she has limited range of motion particular abduction the arm. She also has difficulty laying on her right side. She has tried managing symptoms with Tylenol, ibuprofen, heat, and ice.   She was seen by primary provider, referred for x-rays, and referred to orthopedic care.      Shoulder Pain  This is a chronic problem. The current episode started more than 1 year ago. The problem occurs daily. The problem has been gradually worsening. Associated symptoms include arthralgias and neck pain. Pertinent negatives include no abdominal pain, chest pain, chills, fever, joint swelling, numbness, rash, sore throat or weakness. Exacerbated by: Arm elevation. She has tried position changes, NSAIDs, heat, ice and acetaminophen for the symptoms. The treatment provided mild relief. The following portions of the patient's history were reviewed and updated as appropriate: She  has a past medical history of Hyperlipidemia, Migraine, and Seasonal allergies. She has No Known Allergies. .    Review of Systems   Constitutional: Negative for chills and fever. HENT: Negative for sore throat. Eyes: Negative for visual disturbance. Respiratory: Negative for shortness of breath. Cardiovascular: Negative for chest pain. Gastrointestinal: Negative for abdominal pain. Genitourinary: Negative for flank pain. Musculoskeletal: Positive for arthralgias and neck pain. Negative for joint swelling. Skin: Negative for rash and wound. Neurological: Negative for weakness and numbness. Hematological: Does not bruise/bleed easily. Psychiatric/Behavioral: Negative for self-injury. Objective:  Vitals:    08/16/23 0802   BP: 118/80   Pulse: 69   Weight: 91.6 kg (202 lb)   Height: 5' 4" (1.626 m)     Back Exam     Comments:    Cervical spine  - No tenderness  - Normal range of motion  - Negative axial load  - Negative Spurling's  - Normal strength, sensation, bicep reflex both upper extremities      Right Hand Exam     Muscle Strength   The patient has normal right wrist strength. Other   Sensation: normal  Pulse: present      Left Hand Exam     Muscle Strength   The patient has normal left wrist strength.     Other   Sensation: normal  Pulse: present      Right Elbow Exam Tenderness   The patient is experiencing no tenderness. Range of Motion   The patient has normal right elbow ROM. Muscle Strength   The patient has normal right elbow strength (5/5 flexion and extension). Other   Sensation: normal      Left Elbow Exam     Other   Sensation: normal      Right Shoulder Exam     Tenderness   Right shoulder tenderness location: Anterior. Range of Motion   Active abduction: 130   Forward flexion: 150   Internal rotation 0 degrees: Sacrum     Muscle Strength   Abduction: 5/5   Internal rotation: 5/5   External rotation: 5/5   Supraspinatus: 5/5     Tests   Rousseau test: positive    Other   Sensation: normal    Comments:  Negative empty can test      Left Shoulder Exam     Other   Sensation: normal           Strength/Myotome Testing     Left Wrist/Hand   Normal wrist strength    Right Wrist/Hand   Normal wrist strength      Physical Exam  Vitals and nursing note reviewed. Constitutional:       General: She is not in acute distress. Appearance: Normal appearance. She is well-developed. She is not ill-appearing or diaphoretic. HENT:      Head: Normocephalic and atraumatic. Right Ear: External ear normal.      Left Ear: External ear normal.   Eyes:      Conjunctiva/sclera: Conjunctivae normal.   Neck:      Trachea: No tracheal deviation. Cardiovascular:      Rate and Rhythm: Normal rate. Pulmonary:      Effort: Pulmonary effort is normal. No respiratory distress. Abdominal:      General: There is no distension. Musculoskeletal:         General: Tenderness present. No swelling, deformity or signs of injury. Skin:     General: Skin is warm and dry. Coloration: Skin is not jaundiced or pale. Neurological:      Mental Status: She is oriented to person, place, and time. Psychiatric:         Mood and Affect: Mood normal.         Behavior: Behavior normal.         Thought Content:  Thought content normal.         Judgment: Judgment normal.           I have personally reviewed pertinent films in PACS and my interpretation is AC arthropathy with downward osteophyte. Large joint arthrocentesis: R subacromial bursa  Universal Protocol:  Consent: Verbal consent obtained. Risks and benefits: risks, benefits and alternatives were discussed  Consent given by: patient  Time out: Immediately prior to procedure a "time out" was called to verify the correct patient, procedure, equipment, support staff and site/side marked as required. Patient understanding: patient states understanding of the procedure being performed  Patient consent: the patient's understanding of the procedure matches consent given  Procedure consent: procedure consent matches procedure scheduled  Relevant documents: relevant documents present and verified  Test results: test results available and properly labeled  Site marked: the operative site was marked  Radiology Images displayed and confirmed.  If images not available, report reviewed: imaging studies available  Required items: required blood products, implants, devices, and special equipment available  Patient identity confirmed: verbally with patient    Supporting Documentation  Indications: pain   Procedure Details  Location: shoulder - R subacromial bursa  Preparation: Patient was prepped and draped in the usual sterile fashion  Needle gauge: 21G 2"  Ultrasound guidance: no  Approach: lateral  Medications administered: 4 mL bupivacaine 0.25 %; 1 mL bupivacaine 0.25 %; 40 mg triamcinolone acetonide 40 mg/mL    Patient tolerance: patient tolerated the procedure well with no immediate complications  Dressing:  Sterile dressing applied

## 2023-08-21 ENCOUNTER — HOSPITAL ENCOUNTER (OUTPATIENT)
Dept: RADIOLOGY | Facility: MEDICAL CENTER | Age: 58
Discharge: HOME | End: 2023-08-21
Payer: COMMERCIAL

## 2023-08-21 DIAGNOSIS — E04.1 THYROID NODULE: ICD-10-CM

## 2023-08-21 PROCEDURE — 76536 US EXAM OF HEAD AND NECK: CPT

## 2023-11-01 ENCOUNTER — TELEPHONE (OUTPATIENT)
Age: 58
End: 2023-11-01

## 2023-11-01 NOTE — TELEPHONE ENCOUNTER
Patient is sick, no covid. The patient feels terrible. Thinks it is the flu, per patient symptoms are compatible with the flu, cough, no fever, aches, chest feels heavy, head seems filled. Would like a Z-sarah called in.

## 2023-11-01 NOTE — TELEPHONE ENCOUNTER
Left detailed message. Patient would need to make an appointment to be evaluated unable to send medication.

## 2023-11-02 ENCOUNTER — OFFICE VISIT (OUTPATIENT)
Dept: FAMILY MEDICINE CLINIC | Facility: CLINIC | Age: 58
End: 2023-11-02
Payer: COMMERCIAL

## 2023-11-02 VITALS
DIASTOLIC BLOOD PRESSURE: 80 MMHG | SYSTOLIC BLOOD PRESSURE: 114 MMHG | WEIGHT: 200.8 LBS | TEMPERATURE: 97.8 F | OXYGEN SATURATION: 99 % | HEART RATE: 90 BPM | BODY MASS INDEX: 34.28 KG/M2 | HEIGHT: 64 IN

## 2023-11-02 DIAGNOSIS — E78.5 HYPERLIPIDEMIA, UNSPECIFIED HYPERLIPIDEMIA TYPE: ICD-10-CM

## 2023-11-02 DIAGNOSIS — U07.1 COVID: ICD-10-CM

## 2023-11-02 DIAGNOSIS — E11.9 TYPE 2 DIABETES MELLITUS WITHOUT COMPLICATION, WITHOUT LONG-TERM CURRENT USE OF INSULIN (HCC): Primary | ICD-10-CM

## 2023-11-02 DIAGNOSIS — J06.9 VIRAL UPPER RESPIRATORY ILLNESS: ICD-10-CM

## 2023-11-02 LAB
SARS-COV-2 AG UPPER RESP QL IA: POSITIVE
VALID CONTROL: ABNORMAL

## 2023-11-02 PROCEDURE — 99214 OFFICE O/P EST MOD 30 MIN: CPT | Performed by: PHYSICIAN ASSISTANT

## 2023-11-02 PROCEDURE — 87811 SARS-COV-2 COVID19 W/OPTIC: CPT | Performed by: PHYSICIAN ASSISTANT

## 2023-11-02 NOTE — PROGRESS NOTES
Assessment/Plan:     Diagnoses and all orders for this visit:    Type 2 diabetes mellitus without complication, without long-term current use of insulin (HCC)  -     Hemoglobin A1C; Future  -     Comprehensive metabolic panel; Future  -     Albumin / creatinine urine ratio; Future    Hyperlipidemia, unspecified hyperlipidemia type  -     Lipid Panel with Direct LDL reflex; Future    Viral upper respiratory illness  -     POCT Rapid Covid Ag          Subjective:      Patient ID: Carlie Felton is a 60 y.o. female. Presents in the office with upper respiratory symptoms which started Tuesday night. Has cough and congestion. Fever headache and body aches. Nausea and vomited x1. No diarrhea. Did a COVID test yesterday which was negative        The following portions of the patient's history were reviewed and updated as appropriate: She  has a past medical history of Hyperlipidemia, Migraine, and Seasonal allergies. Current Outpatient Medications   Medication Sig Dispense Refill    Blood Glucose Monitoring Suppl (OneTouch Verio Reflect) w/Device KIT Check blood sugars once daily. Please substitute with appropriate alternative as covered by patient's insurance. Dx: E11.65 1 kit 0    cyclobenzaprine (FLEXERIL) 10 mg tablet Take 1 tablet (10 mg total) by mouth daily at bedtime 30 tablet 0    dulaglutide (Trulicity) 7.68 UK/1.0DP injection Inject 0.5 mL (0.75 mg total) under the skin every 7 days 3 mL 3    glucose blood (OneTouch Verio) test strip USE AS DIRECTED DAILY 50 strip 2    lisinopril (ZESTRIL) 2.5 mg tablet Take 1 tablet (2.5 mg total) by mouth daily 90 tablet 1    OneTouch Delica Lancets 22J MISC Check blood sugars once daily. Please substitute with appropriate alternative as covered by patient's insurance.  Dx: E11.65 100 each 3    rosuvastatin (CRESTOR) 10 MG tablet Take 1 tablet (10 mg total) by mouth daily 90 tablet 1    SUMAtriptan (IMITREX) 100 mg tablet Take 1 tablet (100 mg total) by mouth once as needed for migraine for up to 1 dose 9 tablet 0     No current facility-administered medications for this visit. She has No Known Allergies. .    Review of Systems   Constitutional:  Positive for activity change, appetite change, chills, fatigue and fever. HENT:  Negative for ear pain, postnasal drip, rhinorrhea, sinus pressure, sinus pain and sore throat. Respiratory:  Positive for cough. Musculoskeletal:  Positive for myalgias. Objective:        Physical Exam  Vitals and nursing note reviewed. Constitutional:       General: She is not in acute distress. Appearance: She is obese. She is not diaphoretic. HENT:      Head: Normocephalic and atraumatic. Right Ear: Tympanic membrane, ear canal and external ear normal.      Left Ear: Tympanic membrane, ear canal and external ear normal.      Nose:      Right Sinus: No maxillary sinus tenderness or frontal sinus tenderness. Left Sinus: No maxillary sinus tenderness or frontal sinus tenderness. Mouth/Throat:      Pharynx: No oropharyngeal exudate or posterior oropharyngeal erythema. Eyes:      Conjunctiva/sclera: Conjunctivae normal.      Pupils: Pupils are equal, round, and reactive to light. Neck:      Thyroid: No thyromegaly. Vascular: No carotid bruit. Cardiovascular:      Rate and Rhythm: Normal rate and regular rhythm. Heart sounds: No murmur heard. No friction rub. No gallop. Pulmonary:      Effort: Pulmonary effort is normal. No respiratory distress. Breath sounds: Normal breath sounds. No wheezing. Lymphadenopathy:      Cervical: No cervical adenopathy. Skin:     General: Skin is warm and dry. Findings: No erythema or rash. Neurological:      General: No focal deficit present. Mental Status: She is alert and oriented to person, place, and time. Psychiatric:         Behavior: Behavior normal.         Thought Content:  Thought content normal.         Judgment: Judgment normal.

## 2023-11-02 NOTE — LETTER
November 2, 2023     Patient: Boo Callaway  YOB: 1965  Date of Visit: 11/2/2023      To Whom it May Concern:    Nusrat Arevalo is under my professional care. Filiberto Syed was seen in my office on 11/2/2023. Filiberto Syed may return to school on 11/6/2023 . If you have any questions or concerns, please don't hesitate to call.          Sincerely,          Samuel Santiago PA-C        CC: No Recipients

## 2023-11-07 ENCOUNTER — TELEPHONE (OUTPATIENT)
Age: 58
End: 2023-11-07

## 2023-11-07 NOTE — TELEPHONE ENCOUNTER
Patient called to report that she is still experiencing Covid symptoms and is requesting that Ranjan Driscoll write a new letter for her employer, allowing her to work from home until Monday 11/13   She also added that in her original letter it stated "school" not "work" if that could be corrected also.   Please upload to patient portal

## 2023-11-13 ENCOUNTER — TELEPHONE (OUTPATIENT)
Age: 58
End: 2023-11-13

## 2023-11-13 NOTE — TELEPHONE ENCOUNTER
call from patient advising she was diagnosed with Covid 2 weeks ago and she still has a cough and runny nose. She states thats the only symptoms left over. She would like to know if medication can be prescribed without an appointment as she was seen on 11/2. Please call patient to discuss.

## 2023-11-14 ENCOUNTER — OFFICE VISIT (OUTPATIENT)
Dept: FAMILY MEDICINE CLINIC | Facility: CLINIC | Age: 58
End: 2023-11-14
Payer: COMMERCIAL

## 2023-11-14 VITALS
BODY MASS INDEX: 34.31 KG/M2 | RESPIRATION RATE: 16 BRPM | WEIGHT: 201 LBS | OXYGEN SATURATION: 97 % | HEIGHT: 64 IN | HEART RATE: 89 BPM | DIASTOLIC BLOOD PRESSURE: 66 MMHG | SYSTOLIC BLOOD PRESSURE: 100 MMHG | TEMPERATURE: 97 F

## 2023-11-14 DIAGNOSIS — R05.2 SUBACUTE COUGH: Primary | ICD-10-CM

## 2023-11-14 PROCEDURE — 99213 OFFICE O/P EST LOW 20 MIN: CPT | Performed by: INTERNAL MEDICINE

## 2023-11-14 RX ORDER — DEXTROMETHORPHAN HYDROBROMIDE AND PROMETHAZINE HYDROCHLORIDE 15; 6.25 MG/5ML; MG/5ML
5 SYRUP ORAL 4 TIMES DAILY PRN
Qty: 180 ML | Refills: 1 | Status: SHIPPED | OUTPATIENT
Start: 2023-11-14

## 2023-11-14 RX ORDER — METHYLPREDNISOLONE 4 MG/1
TABLET ORAL
Qty: 21 EACH | Refills: 0 | Status: SHIPPED | OUTPATIENT
Start: 2023-11-14

## 2023-11-14 NOTE — PROGRESS NOTES
Name: Leah Mooney      : 1965      MRN: [id number]  Encounter Provider: Alla Nelson MD  Encounter Date: 2023   Encounter department: [place] [address]. Subacute cough  Assessment & Plan:  Patient had COVID approximately 2 weeks ago. As far as I can tell she was not treated for same and has done well with the exception that she has a cough that can be uncontrollable at times. Try promethazine DM and a Medrol Dosepak. Orders:  -     methylPREDNISolone 4 MG tablet therapy pack; Use as directed on package  -     promethazine-dextromethorphan (PHENERGAN-DM) 6.25-15 mg/5 mL oral syrup; Take 5 mL by mouth 4 (four) times a day as needed for cough           Subjective      Cough  This is a new problem. The current episode started 1 to 4 weeks ago. The problem has been waxing and waning. The problem occurs every few minutes. The cough is Productive of sputum. Pertinent negatives include no chest pain, chills, ear pain, fever, nasal congestion, postnasal drip, rash, sore throat, shortness of breath or wheezing. The symptoms are aggravated by lying down, cold air and exercise. She has tried cool air and rest for the symptoms. The treatment provided no relief. Review of Systems   Constitutional:  Negative for chills and fever. HENT:  Negative for ear pain, postnasal drip and sore throat. Eyes:  Negative for pain and visual disturbance. Respiratory:  Positive for cough. Negative for shortness of breath and wheezing. Cardiovascular:  Negative for chest pain and palpitations. Gastrointestinal:  Negative for abdominal pain and vomiting. Genitourinary:  Negative for dysuria and hematuria. Musculoskeletal:  Negative for arthralgias and back pain. Skin:  Negative for color change and rash. Neurological:  Negative for seizures and syncope. All other systems reviewed and are negative.       Current Outpatient Medications on File Prior to Visit Medication Sig    cyclobenzaprine (FLEXERIL) 10 mg tablet Take 1 tablet (10 mg total) by mouth daily at bedtime    dulaglutide (Trulicity) 3.68 JZ/0.9HP injection Inject 0.5 mL (0.75 mg total) under the skin every 7 days    glucose blood (OneTouch Verio) test strip USE AS DIRECTED DAILY    lisinopril (ZESTRIL) 2.5 mg tablet Take 1 tablet (2.5 mg total) by mouth daily    OneTouch Delica Lancets 04Z MISC Check blood sugars once daily. Please substitute with appropriate alternative as covered by patient's insurance. Dx: E11.65    rosuvastatin (CRESTOR) 10 MG tablet Take 1 tablet (10 mg total) by mouth daily    SUMAtriptan (IMITREX) 100 mg tablet Take 1 tablet (100 mg total) by mouth once as needed for migraine for up to 1 dose    Blood Glucose Monitoring Suppl (OneTouch Verio Reflect) w/Device KIT Check blood sugars once daily. Please substitute with appropriate alternative as covered by patient's insurance. Dx: E11.65 (Patient not taking: Reported on 11/14/2023)       Objective     /66 (BP Location: Left arm, Patient Position: Sitting, Cuff Size: Large)   Pulse 89   Temp (!) 97 °F (36.1 °C) (Temporal)   Resp 16   Ht 5' 4" (1.626 m)   Wt 91.2 kg (201 lb)   SpO2 97%   BMI 34.50 kg/m²     Physical Exam  Constitutional:       General: She is not in acute distress. Appearance: She is well-developed. She is obese. HENT:      Right Ear: External ear normal.      Left Ear: External ear normal.      Nose: Nose normal.      Mouth/Throat:      Pharynx: No oropharyngeal exudate. Eyes:      Pupils: Pupils are equal, round, and reactive to light. Neck:      Thyroid: No thyromegaly. Vascular: No JVD. Cardiovascular:      Rate and Rhythm: Normal rate and regular rhythm. Heart sounds: Normal heart sounds. No murmur heard. No gallop. Pulmonary:      Effort: Pulmonary effort is normal. No respiratory distress. Breath sounds: Normal breath sounds. No wheezing, rhonchi or rales.    Chest: Chest wall: No tenderness. Abdominal:      General: Bowel sounds are normal. There is no distension. Palpations: Abdomen is soft. There is no mass. Tenderness: There is no abdominal tenderness. Musculoskeletal:         General: No tenderness. Normal range of motion. Cervical back: Normal range of motion and neck supple. Lymphadenopathy:      Cervical: No cervical adenopathy. Skin:     Findings: No rash. Neurological:      Mental Status: She is alert and oriented to person, place, and time. Cranial Nerves: No cranial nerve deficit. Coordination: Coordination normal.   Psychiatric:         Behavior: Behavior normal.         Thought Content:  Thought content normal.         Judgment: Judgment normal.       Corrinne Balloon, MD

## 2023-11-14 NOTE — ASSESSMENT & PLAN NOTE
Patient had COVID approximately 2 weeks ago. As far as I can tell she was not treated for same and has done well with the exception that she has a cough that can be uncontrollable at times. Try promethazine DM and a Medrol Dosepak.

## 2023-11-20 ENCOUNTER — HOSPITAL ENCOUNTER (OUTPATIENT)
Dept: RADIOLOGY | Facility: MEDICAL CENTER | Age: 58
Discharge: HOME | End: 2023-11-20
Payer: COMMERCIAL

## 2023-11-20 VITALS — HEIGHT: 64 IN | WEIGHT: 201 LBS | BODY MASS INDEX: 34.31 KG/M2

## 2023-11-20 DIAGNOSIS — Z12.31 ENCOUNTER FOR SCREENING MAMMOGRAM FOR MALIGNANT NEOPLASM OF BREAST: ICD-10-CM

## 2023-11-20 PROCEDURE — 77067 SCR MAMMO BI INCL CAD: CPT

## 2023-11-20 PROCEDURE — 77063 BREAST TOMOSYNTHESIS BI: CPT

## 2024-01-08 ENCOUNTER — EVALUATION (OUTPATIENT)
Dept: PHYSICAL THERAPY | Facility: CLINIC | Age: 59
End: 2024-01-08
Payer: COMMERCIAL

## 2024-01-08 DIAGNOSIS — M19.011 ARTHRITIS OF RIGHT ACROMIOCLAVICULAR JOINT: ICD-10-CM

## 2024-01-08 DIAGNOSIS — M75.41 SUBACROMIAL IMPINGEMENT OF RIGHT SHOULDER: ICD-10-CM

## 2024-01-08 DIAGNOSIS — M62.838 TRAPEZIUS MUSCLE SPASM: ICD-10-CM

## 2024-01-08 PROCEDURE — 97161 PT EVAL LOW COMPLEX 20 MIN: CPT | Performed by: PHYSICAL THERAPIST

## 2024-01-08 PROCEDURE — 97110 THERAPEUTIC EXERCISES: CPT | Performed by: PHYSICAL THERAPIST

## 2024-01-08 NOTE — PROGRESS NOTES
PT Evaluation     Today's date: 2024  Patient name: Leah Lora  : 1965  MRN: [id number]  Referring provider: Yeison Vazquez*  Dx:   Encounter Diagnosis     ICD-10-CM    1. Arthritis of right acromioclavicular joint  M19.011 Ambulatory Referral to Physical Therapy      2. Subacromial impingement of right shoulder  M75.41 Ambulatory Referral to Physical Therapy      3. Trapezius muscle spasm  M62.838 Ambulatory Referral to Physical Therapy          Start Time: 1520  Stop Time: 1620  Total time in clinic (min): 60 minutes    Assessment  Assessment details: Leah Lora is a 58 y.o. female who presents with chronic right shoulder pain. Patient described painful ROM in all planes negatively affecting her ability to perform a/iadls. Examination findings demonstrate decreased shoulder girdle strength an d limited/painful shoulder ROM in all planes. Patient's clinical presentation is consistent with their referring diagnosis of impingement syndrome. Extensive time spent educating Patient regarding pathophysiology, diagnostic imaging, and conservative vs surgical options. Patient would benefit from skilled physical therapy to address their aforementioned impairments, improve their level of function and to improve their overall quality of life.  Impairments: abnormal or restricted ROM, activity intolerance, impaired physical strength, lacks appropriate home exercise program, pain with function and poor posture   Understanding of Dx/Px/POC: excellent  Goals  Short Term Goals: to be achieved by 4 weeks  1) Patient to be independent with basic HEP.  2) Decrease pain to 4/10 at it's worst.  3) Increase UE strength by 1/2 MMT grade in all deficient planes.  4) Increase UE ROM by > 5 deg in all deficient planes.  5) Patient to report decreased sleep interruption secondary to pain.    Long Term Goals: to be achieved by discharge  1) FOTO equal to or greater than TBD.  2) Patient to be independent with  comprehensive HEP.  3) Abolish pain for improved quality of life.  4) Increase UE strength to 5/5 MMT grade in all deficient planes to improve a/iadls.  5) Increase UE ROM to within 5 deg of contralateral UE to improve a/iadls.  6) Patient to report no sleep interruption secondary to pain.    Plan  Patient would benefit from: skilled PT  Planned modality interventions: biofeedback, cryotherapy, hydrotherapy, unattended electrical stimulation, thermotherapy: hydrocollator packs and low level laser therapy  Planned therapy interventions: activity modification, ADL retraining, behavior modification, body mechanics training, functional ROM exercises, home exercise program, IADL retraining, joint mobilization, manual therapy, massage, neuromuscular re-education, patient education, postural training, strengthening, stretching, therapeutic activities and therapeutic exercise  Frequency: 1-2x week.  Duration in weeks: 12  Plan of Care beginning date: 2024  Plan of Care expiration date: 2024  Treatment plan discussed with: patient        Subjective Evaluation    History of Present Illness  Mechanism of injury: Patient presents with chronic right shoulder pain beginning several years ago. Patient has had episodes where she could not lift her arm above shoulder height. Patient's shoulder is painful with use, but does not stop her from performing any specific activity. Patient denies experiencing weakness, loss of motion or tingling/numbness. Patient has been prescribed Meloxicam, Flexeril, Methylprednisolon, and Tylenol with partial relief. Patient underwent a steroid injection which provided temporary relief. Patient's next scheduled f/u appointment with ortho is scheduled for .   Patient Goals  Patient goal: return to premorbid norm ; decrease pain  Pain  Current pain ratin  At best pain ratin  At worst pain ratin  Location: right UT, shoulder, lateral deltoid  Quality: soreness, spasm, deep,  "ache.      Diagnostic Tests  X-ray: abnormal (Right shoulder: \"Mild osteoarthritis acromioclavicular joint with downward projecting osteophyte. No significant glenohumeral degenerative change.\")        Objective     Active Range of Motion   Left Shoulder   Flexion: 160 degrees   Abduction: 150 degrees   External rotation BTH: T4   Internal rotation BTB: T6     Right Shoulder   Flexion: 152 degrees with pain  Abduction: 145 degrees with pain  External rotation BTH: T3   Internal rotation BTB: L1     Passive Range of Motion   Left Shoulder   Flexion: 175 degrees   Abduction: 175 degrees   External rotation 90°: 100 degrees   Internal rotation 90°: 60 degrees     Right Shoulder   Flexion: 170 degrees with pain  Abduction: 170 degrees with pain  External rotation 90°: 100 degrees   Internal rotation 90°: 60 degrees     Strength/Myotome Testing     Left Shoulder   Normal muscle strength    Right Shoulder     Planes of Motion   Flexion: 4- ((+) pain)   Abduction: 3+ ((+) pain)   External rotation at 0°: 5   Internal rotation at 0°: 5 ((+) pain)     Left Elbow   Flexion: 5  Extension: 5    Right Elbow   Flexion: 5  Extension: 5    Left Wrist/Hand   Wrist extension: 5  Wrist flexion: 5    Right Wrist/Hand   Wrist extension: 5  Wrist flexion: 5               POC expires Unit limit Auth  expiration date PT/OT + Visit Limit?   4/1 N/A N/A 60                 Visit/Unit Tracking  AUTH Status:  Date 1/8              N/A Used 1               Remaining                    Precautions: DM      Manuals 1/8                                                                Neuro Re-Ed             Webslide: L row             Bilateral ER with scapular retraction             Prone shoulder ext.             Prone h abd.             Prone scaption                                       Ther Ex             Patient education: pathophysiology, differential diagnosis, diagnostic imaging review, surgical vs conservative treatment GR            UBE   "           TB IR             TB ER             Sleeper str.             IR str. With strap             ER str. At 90/90                          Ther Activity                                       Gait Training                                       Modalities

## 2024-01-13 PROBLEM — R05.2 SUBACUTE COUGH: Status: RESOLVED | Noted: 2023-11-14 | Resolved: 2024-01-13

## 2024-01-15 ENCOUNTER — OFFICE VISIT (OUTPATIENT)
Dept: PHYSICAL THERAPY | Facility: CLINIC | Age: 59
End: 2024-01-15
Payer: COMMERCIAL

## 2024-01-15 DIAGNOSIS — M75.41 SUBACROMIAL IMPINGEMENT OF RIGHT SHOULDER: ICD-10-CM

## 2024-01-15 DIAGNOSIS — M62.838 TRAPEZIUS MUSCLE SPASM: ICD-10-CM

## 2024-01-15 DIAGNOSIS — M19.011 ARTHRITIS OF RIGHT ACROMIOCLAVICULAR JOINT: Primary | ICD-10-CM

## 2024-01-15 PROCEDURE — 97110 THERAPEUTIC EXERCISES: CPT | Performed by: PHYSICAL THERAPIST

## 2024-01-15 PROCEDURE — 97112 NEUROMUSCULAR REEDUCATION: CPT | Performed by: PHYSICAL THERAPIST

## 2024-01-15 NOTE — PROGRESS NOTES
"Daily Note     Today's date: 1/15/2024  Patient name: Leah Lora  : 1965  MRN: [id number]  Referring provider: Yeison Vazquez*  Dx:   Encounter Diagnosis     ICD-10-CM    1. Arthritis of right acromioclavicular joint  M19.011       2. Subacromial impingement of right shoulder  M75.41       3. Trapezius muscle spasm  M62.838           Start Time: 1550  Stop Time: 1640  Total time in clinic (min): 50 minutes    Subjective: Patient reports no significant changes to overall status since last visit (IE).      Objective: See treatment diary below      Assessment: Tolerated treatment well. Patient demonstrated fatigue post treatment and would benefit from continued PT. Patient with mild to moderate shoulder pain during IR stretches, otherwise tolerated treatment with mild discomfort.      Plan: Continue per plan of care.  Progress treatment as tolerated.       POC expires Unit limit Auth  expiration date PT/OT + Visit Limit?    N/A N/A 60                 Visit/Unit Tracking  AUTH Status:  Date 1/8 1/15             N/A Used 1 2              Remaining                    Precautions: DM      Manuals 1/8 1/15                                                               Neuro Re-Ed             Webslide: L row  GTB 2x10 3\"           Bilateral ER with scapular retraction  RTB 2x10 5\"           Prone shoulder ext.  NV           Prone h abd.  NV           Prone scaption  NV                                     Ther Ex             Patient education: pathophysiology, differential diagnosis, diagnostic imaging review, surgical vs conservative treatment GR            UBE  3' / 3'           TB IR  RTB 2x10           TB ER  RTB 2x10           Sleeper str.  10x10\"           IR str. With strap  20x5\"           ER str. At 90/90  10x10\"                        Ther Activity                                       Gait Training                                       Modalities                                            "

## 2024-01-16 ENCOUNTER — APPOINTMENT (OUTPATIENT)
Dept: PHYSICAL THERAPY | Facility: CLINIC | Age: 59
End: 2024-01-16
Payer: COMMERCIAL

## 2024-01-16 DIAGNOSIS — M62.838 TRAPEZIUS MUSCLE SPASM: ICD-10-CM

## 2024-01-16 DIAGNOSIS — M75.41 SUBACROMIAL IMPINGEMENT OF RIGHT SHOULDER: ICD-10-CM

## 2024-01-16 DIAGNOSIS — M19.011 ARTHRITIS OF RIGHT ACROMIOCLAVICULAR JOINT: Primary | ICD-10-CM

## 2024-01-16 NOTE — PROGRESS NOTES
"Daily Note     Today's date: 2024  Patient name: Leah Lora  : 1965  MRN: [id number]  Referring provider: Yeison Vazquez*  Dx:   Encounter Diagnosis     ICD-10-CM    1. Arthritis of right acromioclavicular joint  M19.011       2. Subacromial impingement of right shoulder  M75.41       3. Trapezius muscle spasm  M62.838                      Subjective: ***      Objective: See treatment diary below      Assessment: Tolerated treatment {Tolerated treatment :0240120185}. Patient {assessment:9224743432}      Plan: {PLAN:7851832925}     POC expires Unit limit Auth  expiration date PT/OT + Visit Limit?    N/A N/A 60                 Visit/Unit Tracking  AUTH Status:  Date 1/8 1/15             N/A Used 1 2              Remaining                    Precautions: DM      Manuals 1/8 1/15                                                               Neuro Re-Ed             Webslide: L row  GTB 2x10 3\"           Bilateral ER with scapular retraction  RTB 2x10 5\"           Prone shoulder ext.  NV           Prone h abd.  NV           Prone scaption  NV                                     Ther Ex             Patient education: pathophysiology, differential diagnosis, diagnostic imaging review, surgical vs conservative treatment GR            UBE  3' / 3'           TB IR  RTB 2x10           TB ER  RTB 2x10           Sleeper str.  10x10\"           IR str. With strap  20x5\"           ER str. At 90/90  10x10\"                        Ther Activity                                       Gait Training                                       Modalities                                              "

## 2024-01-22 ENCOUNTER — OFFICE VISIT (OUTPATIENT)
Dept: PHYSICAL THERAPY | Facility: CLINIC | Age: 59
End: 2024-01-22
Payer: COMMERCIAL

## 2024-01-22 DIAGNOSIS — M75.41 SUBACROMIAL IMPINGEMENT OF RIGHT SHOULDER: ICD-10-CM

## 2024-01-22 DIAGNOSIS — M62.838 TRAPEZIUS MUSCLE SPASM: ICD-10-CM

## 2024-01-22 DIAGNOSIS — M19.011 ARTHRITIS OF RIGHT ACROMIOCLAVICULAR JOINT: Primary | ICD-10-CM

## 2024-01-22 PROCEDURE — 97110 THERAPEUTIC EXERCISES: CPT

## 2024-01-22 PROCEDURE — 97112 NEUROMUSCULAR REEDUCATION: CPT

## 2024-01-22 NOTE — PROGRESS NOTES
"Daily Note     Today's date: 2024  Patient name: Leah Lora  : 1965  MRN: [id number]  Referring provider: Yeison Vazquez*  Dx:   Encounter Diagnosis     ICD-10-CM    1. Arthritis of right acromioclavicular joint  M19.011       2. Subacromial impingement of right shoulder  M75.41       3. Trapezius muscle spasm  M62.838           Start Time: 1530  Stop Time: 1615  Total time in clinic (min): 45 minutes    Subjective: pt noted no changes since last treatment session. She noted some increase soreness.    Ortho follow up on 24    Objective: See treatment diary below      Assessment: Continued with treatment session with focus on R shoulder. Pt noted that she did feel some increase soreness after doorway stretch. Tolerated treatment well. Patient demonstrated fatigue post treatment, exhibited good technique with therapeutic exercises, and would benefit from continued PT      Plan: Continue per plan of care.      POC expires Unit limit Auth  expiration date PT/OT + Visit Limit?    N/A N/A 60                 Visit/Unit Tracking  AUTH Status:  Date 1/8 1/15 1/22            N/A Used 1 2 3             Remaining                    Precautions: DM      Manuals 1/8 1/15 1/22                                                              Neuro Re-Ed             Webslide: L row  GTB 2x10 3\" GTB 2x 10           Bilateral ER with scapular retraction  RTB 2x10 5\" RTB 5\" 2x 10           Prone shoulder ext.  NV           Prone h abd.  NV           Prone scaption  NV                                     Ther Ex             Patient education: pathophysiology, differential diagnosis, diagnostic imaging review, surgical vs conservative treatment GR            UBE  3' / 3' 4'/4'          TB IR  RTB 2x10 RTB 2x 10           TB ER  RTB 2x10 RTB 2x 10           Sleeper str.  10x10\" 10\" x 10           IR str. With strap  20x5\" 10\" x 10           ER str. At 90/90  10x10\" 10\" x 10                        Ther " Activity                                       Gait Training                                       Modalities

## 2024-01-24 ENCOUNTER — OFFICE VISIT (OUTPATIENT)
Dept: OBGYN CLINIC | Facility: CLINIC | Age: 59
End: 2024-01-24
Payer: COMMERCIAL

## 2024-01-24 VITALS
WEIGHT: 201 LBS | HEART RATE: 74 BPM | SYSTOLIC BLOOD PRESSURE: 122 MMHG | BODY MASS INDEX: 34.31 KG/M2 | HEIGHT: 64 IN | DIASTOLIC BLOOD PRESSURE: 76 MMHG

## 2024-01-24 DIAGNOSIS — M19.011 ARTHRITIS OF RIGHT ACROMIOCLAVICULAR JOINT: Primary | ICD-10-CM

## 2024-01-24 DIAGNOSIS — M75.41 SUBACROMIAL IMPINGEMENT OF RIGHT SHOULDER: ICD-10-CM

## 2024-01-24 PROCEDURE — 20610 DRAIN/INJ JOINT/BURSA W/O US: CPT | Performed by: FAMILY MEDICINE

## 2024-01-24 PROCEDURE — 99214 OFFICE O/P EST MOD 30 MIN: CPT | Performed by: FAMILY MEDICINE

## 2024-01-24 RX ORDER — DICLOFENAC SODIUM 75 MG/1
75 TABLET, DELAYED RELEASE ORAL 2 TIMES DAILY
Qty: 60 TABLET | Refills: 1 | Status: SHIPPED | OUTPATIENT
Start: 2024-01-24

## 2024-01-24 RX ORDER — TRIAMCINOLONE ACETONIDE 40 MG/ML
40 INJECTION, SUSPENSION INTRA-ARTICULAR; INTRAMUSCULAR
Status: COMPLETED | OUTPATIENT
Start: 2024-01-24 | End: 2024-01-24

## 2024-01-24 RX ORDER — BUPIVACAINE HYDROCHLORIDE 2.5 MG/ML
1 INJECTION, SOLUTION INFILTRATION; PERINEURAL
Status: COMPLETED | OUTPATIENT
Start: 2024-01-24 | End: 2024-01-24

## 2024-01-24 RX ORDER — BUPIVACAINE HYDROCHLORIDE 2.5 MG/ML
4 INJECTION, SOLUTION INFILTRATION; PERINEURAL
Status: COMPLETED | OUTPATIENT
Start: 2024-01-24 | End: 2024-01-24

## 2024-01-24 RX ADMIN — TRIAMCINOLONE ACETONIDE 40 MG: 40 INJECTION, SUSPENSION INTRA-ARTICULAR; INTRAMUSCULAR at 09:00

## 2024-01-24 RX ADMIN — BUPIVACAINE HYDROCHLORIDE 1 ML: 2.5 INJECTION, SOLUTION INFILTRATION; PERINEURAL at 09:00

## 2024-01-24 RX ADMIN — BUPIVACAINE HYDROCHLORIDE 4 ML: 2.5 INJECTION, SOLUTION INFILTRATION; PERINEURAL at 09:00

## 2024-01-24 NOTE — PATIENT INSTRUCTIONS
Rx: Diclofenac 75 mg  - Twice daily  - Eat first  - Every day  - 30 days  - No ibuprofen  - No Aleve  - Tylenol is okay

## 2024-01-24 NOTE — PROGRESS NOTES
Assessment/Plan:  Assessment/Plan   Diagnoses and all orders for this visit:    Arthritis of right acromioclavicular joint  -     diclofenac (VOLTAREN) 75 mg EC tablet; Take 1 tablet (75 mg total) by mouth 2 (two) times a day    Subacromial impingement of right shoulder  -     Large joint arthrocentesis: R subacromial bursa        58-year-old right-hand-dominant female with right shoulder pain more than few years duration.  Discussed the patient physical exam, impression, and plan.  Right shoulder has range of motion forward flexion to 160 degrees, abduction 160 degrees, and internal rotation to the sacrum.  Clinical impression is that she has symptoms from degenerative changes and tendinopathy.  Symptoms have worsened despite steroid injection, formal therapy, and NSAIDs.  Recommend patient complete current course of formal therapy and continue home exercise program.  I offered repeat steroid injection to which she agreed.  I administered mixture of 3 cc 0.25% bupivacaine and 1 cc Kenalog to the right shoulder subacromial space without complication.  She is to start taking diclofenac 75 mg twice daily with food for 30 days and not to take ibuprofen or Aleve, but may take Tylenol.  Patient was advised that if symptoms worsen or recur we will consider advanced imaging study.          Subjective:   Patient ID: Leah Lora is a 58 y.o. female.  Chief Complaint   Patient presents with    Right Shoulder - Follow-up        58-year-old right-hand-dominant female following for right shoulder pain more than few years duration.  She was last seen by me 5 months ago at which point she was given steroid injection to the right shoulder, prescribed meloxicam and cyclobenzaprine, and referred to formal therapy.  She has been attending formal therapy and doing home exercises since 1/8/2023.  She reports that following steroid injection she had significant relief of symptoms that lasted for about 2 months.  Since that time pain  "in the shoulder has been worsening.  She has pain described as generalized to the shoulder, nonradiating, worse with moving the arm, associated limited range of motion particularly reaching behind the back, and improved with resting.  She does not think meloxicam helped her symptoms.    Shoulder Pain  This is a new problem. The current episode started more than 1 month ago. The problem occurs daily. The problem has been gradually worsening. Associated symptoms include arthralgias. Pertinent negatives include no joint swelling, numbness or weakness. Exacerbated by: Arm use. She has tried rest, position changes and NSAIDs (Physical therapy, home exercise) for the symptoms. The treatment provided mild relief.               Review of Systems   Musculoskeletal:  Positive for arthralgias. Negative for joint swelling.   Neurological:  Negative for weakness and numbness.       Objective:  Vitals:    01/24/24 0846   BP: 122/76   Pulse: 74   Weight: 91.2 kg (201 lb)   Height: 5' 4\" (1.626 m)      Right Shoulder Exam     Range of Motion   Active abduction:  160   Forward flexion:  160   Internal rotation 0 degrees:  Sacrum             Physical Exam  Vitals and nursing note reviewed.   Constitutional:       Appearance: Normal appearance. She is well-developed. She is not ill-appearing or diaphoretic.   HENT:      Head: Normocephalic and atraumatic.      Right Ear: External ear normal.      Left Ear: External ear normal.   Eyes:      Conjunctiva/sclera: Conjunctivae normal.   Neck:      Trachea: No tracheal deviation.   Cardiovascular:      Rate and Rhythm: Normal rate.   Pulmonary:      Effort: Pulmonary effort is normal. No respiratory distress.   Abdominal:      General: There is no distension.   Musculoskeletal:         General: No swelling or tenderness.   Skin:     General: Skin is warm and dry.      Coloration: Skin is not jaundiced or pale.   Neurological:      Mental Status: She is alert and oriented to person, place, " "and time.   Psychiatric:         Mood and Affect: Mood normal.         Behavior: Behavior normal.         Thought Content: Thought content normal.         Judgment: Judgment normal.         Large joint arthrocentesis: R subacromial bursa  Universal Protocol:  Consent: Verbal consent obtained.  Risks and benefits: risks, benefits and alternatives were discussed  Consent given by: patient  Time out: Immediately prior to procedure a \"time out\" was called to verify the correct patient, procedure, equipment, support staff and site/side marked as required.  Patient understanding: patient states understanding of the procedure being performed  Patient consent: the patient's understanding of the procedure matches consent given  Procedure consent: procedure consent matches procedure scheduled  Relevant documents: relevant documents present and verified  Test results: test results available and properly labeled  Site marked: the operative site was marked  Radiology Images displayed and confirmed. If images not available, report reviewed: imaging studies available  Required items: required blood products, implants, devices, and special equipment available  Patient identity confirmed: verbally with patient  Supporting Documentation  Indications: pain   Procedure Details  Location: shoulder - R subacromial bursa  Preparation: Patient was prepped and draped in the usual sterile fashion  Needle gauge: 21G 2\"  Approach: posterolateral  Medications administered: 4 mL bupivacaine 0.25 %; 1 mL bupivacaine 0.25 %; 40 mg triamcinolone acetonide 40 mg/mL    Patient tolerance: patient tolerated the procedure well with no immediate complications  Dressing:  Sterile dressing applied                  "

## 2024-01-29 ENCOUNTER — OFFICE VISIT (OUTPATIENT)
Dept: PHYSICAL THERAPY | Facility: CLINIC | Age: 59
End: 2024-01-29
Payer: COMMERCIAL

## 2024-01-29 DIAGNOSIS — M19.011 ARTHRITIS OF RIGHT ACROMIOCLAVICULAR JOINT: Primary | ICD-10-CM

## 2024-01-29 DIAGNOSIS — M75.41 SUBACROMIAL IMPINGEMENT OF RIGHT SHOULDER: ICD-10-CM

## 2024-01-29 DIAGNOSIS — M62.838 TRAPEZIUS MUSCLE SPASM: ICD-10-CM

## 2024-01-29 PROCEDURE — 97530 THERAPEUTIC ACTIVITIES: CPT | Performed by: PHYSICAL THERAPIST

## 2024-01-29 PROCEDURE — 97110 THERAPEUTIC EXERCISES: CPT | Performed by: PHYSICAL THERAPIST

## 2024-01-29 NOTE — PROGRESS NOTES
"Daily Note     Today's date: 2024  Patient name: Leah Lora  : 1965  MRN: [id number]  Referring provider: Yeison Vazquez*  Dx:   Encounter Diagnosis     ICD-10-CM    1. Arthritis of right acromioclavicular joint  M19.011       2. Subacromial impingement of right shoulder  M75.41       3. Trapezius muscle spasm  M62.838           Start Time: 1612  Stop Time: 1705  Total time in clinic (min): 53 minutes    Subjective: Patient reports no significant changes to overall status since last visit. Patient reports that she had mild relief following her steroid injection on . Patient's shoulder pain is not waking her up as much and she is now able to lay on it a little better.       Objective: See treatment diary below      Assessment: Tolerated treatment well. Patient demonstrated fatigue post treatment and would benefit from continued PT. Patient reported increased soreness at end of session, no significant increase in pain.      Plan: Continue per plan of care.  Progress treatment as tolerated.       POC expires Unit limit Auth  expiration date PT/OT + Visit Limit?    N/A N/A 60                 Visit/Unit Tracking  AUTH Status:  Date 1/8 1/15 1/22 1/29           N/A Used 1 2 3 4            Remaining                    Precautions: DM      Manuals 1/8 1/15 1/22 1/29                                                             Neuro Re-Ed             Webslide: L row  GTB 2x10 3\" GTB 2x 10           Bilateral ER with scapular retraction  RTB 2x10 5\" RTB 5\" 2x 10           Prone shoulder ext.  NV  2# 2x10 3\"         Prone h abd.  NV  2# 2x10 3\"         Prone scaption  NV  2# 2x10 3\"         Multidirectional ball stability with MB (V, H, CW/CCW circles)    GMB 20x ea.                      Ther Ex             Patient education: pathophysiology, differential diagnosis, diagnostic imaging review, surgical vs conservative treatment GR            UBE  3' / 3' 4'/4' 4' / 4'         TB IR  RTB 2x10 " "RTB 2x 10           TB ER  RTB 2x10 RTB 2x 10           Sleeper str.  10x10\" 10\" x 10           IR str. With strap  20x5\" 10\" x 10           ER str. At 90/90  10x10\" 10\" x 10           PNF D2 UE flexion with TB    YTB 2x10         Standing horizontal abduction with TB             Ther Activity                                       Gait Training                                       Modalities                                                "

## 2024-02-05 ENCOUNTER — APPOINTMENT (OUTPATIENT)
Dept: PHYSICAL THERAPY | Facility: CLINIC | Age: 59
End: 2024-02-05
Payer: COMMERCIAL

## 2024-02-05 DIAGNOSIS — M75.41 SUBACROMIAL IMPINGEMENT OF RIGHT SHOULDER: ICD-10-CM

## 2024-02-05 DIAGNOSIS — M19.011 ARTHRITIS OF RIGHT ACROMIOCLAVICULAR JOINT: Primary | ICD-10-CM

## 2024-02-05 DIAGNOSIS — M62.838 TRAPEZIUS MUSCLE SPASM: ICD-10-CM

## 2024-02-05 NOTE — PROGRESS NOTES
"Daily Note     Today's date: 2024  Patient name: Leah Lora  : 1965  MRN: [id number]  Referring provider: Yeison Vazquez*  Dx:   Encounter Diagnosis     ICD-10-CM    1. Arthritis of right acromioclavicular joint  M19.011       2. Subacromial impingement of right shoulder  M75.41       3. Trapezius muscle spasm  M62.838                      Subjective: ***      Objective: See treatment diary below      Assessment: Tolerated treatment well. Patient demonstrated fatigue post treatment and would benefit from continued PT.       Plan: Continue per plan of care.  Progress treatment as tolerated.       POC expires Unit limit Auth  expiration date PT/OT + Visit Limit?    N/A N/A 60                 Visit/Unit Tracking  AUTH Status:  Date 1/8 1/15 1/22 1/29 2/5          N/A Used 1 2 3 4 5           Remaining                    Precautions: DM      Manuals 1/8 1/15 1/22 1/29 2/5                                                            Neuro Re-Ed             Webslide: L row  GTB 2x10 3\" GTB 2x 10           Bilateral ER with scapular retraction  RTB 2x10 5\" RTB 5\" 2x 10           Prone shoulder ext.  NV  2# 2x10 3\"         Prone h abd.  NV  2# 2x10 3\"         Prone scaption  NV  2# 2x10 3\"         Multidirectional ball stability with MB (V, H, CW/CCW circles)    GMB 20x ea.                      Ther Ex             Patient education: pathophysiology, differential diagnosis, diagnostic imaging review, surgical vs conservative treatment GR            UBE  3' / 3' 4'/4' 4' / 4'         TB IR  RTB 2x10 RTB 2x 10           TB ER  RTB 2x10 RTB 2x 10           Sleeper str.  10x10\" 10\" x 10           IR str. With strap  20x5\" 10\" x 10           ER str. At 90/90  10x10\" 10\" x 10           PNF D2 UE flexion with TB    YTB 2x10         Standing horizontal abduction with TB             Ther Activity                                       Gait Training                                       Modalities           "

## 2024-02-12 ENCOUNTER — OFFICE VISIT (OUTPATIENT)
Dept: PHYSICAL THERAPY | Facility: CLINIC | Age: 59
End: 2024-02-12
Payer: COMMERCIAL

## 2024-02-12 DIAGNOSIS — M19.011 ARTHRITIS OF RIGHT ACROMIOCLAVICULAR JOINT: Primary | ICD-10-CM

## 2024-02-12 DIAGNOSIS — M62.838 TRAPEZIUS MUSCLE SPASM: ICD-10-CM

## 2024-02-12 DIAGNOSIS — M75.41 SUBACROMIAL IMPINGEMENT OF RIGHT SHOULDER: ICD-10-CM

## 2024-02-12 PROCEDURE — 97112 NEUROMUSCULAR REEDUCATION: CPT | Performed by: PHYSICAL THERAPIST

## 2024-02-12 PROCEDURE — 97110 THERAPEUTIC EXERCISES: CPT | Performed by: PHYSICAL THERAPIST

## 2024-02-12 NOTE — PROGRESS NOTES
"Daily Note     Today's date: 2024  Patient name: Leah Lora  : 1965  MRN: [id number]  Referring provider: Yeison Vazquez*  Dx:   Encounter Diagnosis     ICD-10-CM    1. Arthritis of right acromioclavicular joint  M19.011       2. Subacromial impingement of right shoulder  M75.41       3. Trapezius muscle spasm  M62.838           Start Time: 1615  Stop Time: 1700  Total time in clinic (min): 45 minutes    Subjective: Patient reports that she had a set back a week ago when she slipped and fell on her left side irritating her shoulder. Patient's shoulder has improved since last week, but not back to where it ws.       Objective: See treatment diary below      Assessment: Tolerated treatment well. Patient demonstrated fatigue post treatment and would benefit from continued PT. Patient reported increased soreness at end of session. Patient reported tightness reaching behind her back, reeducated regarding sleeper str. And IR stretch with strap.      Plan: Continue per plan of care.  Progress treatment as tolerated.       POC expires Unit limit Auth  expiration date PT/OT + Visit Limit?    N/A N/A 60                 Visit/Unit Tracking  AUTH Status:  Date 1/8 1/15 1/22 1/29 2/12          N/A Used 1 2 3 4 5           Remaining                    Precautions: DM      Manuals 1/8 1/15 1/22 1/29 2/12                                                            Neuro Re-Ed             Webslide: L row  GTB 2x10 3\" GTB 2x 10           Bilateral ER with scapular retraction  RTB 2x10 5\" RTB 5\" 2x 10           Prone shoulder ext.  NV  2# 2x10 3\" 2# 2x10        Prone h abd.  NV  2# 2x10 3\" 2# 2x10        Prone scaption  NV  2# 2x10 3\" 2# 2x10        Multidirectional ball stability with MB (V, H, CW/CCW circles)    GMB 20x ea. GMB 20x ea.                     Ther Ex             Patient education: pathophysiology, differential diagnosis, diagnostic imaging review, surgical vs conservative treatment GR       " "     UBE  3' / 3' 4'/4' 4' / 4' 4' / 4'        TB IR  RTB 2x10 RTB 2x 10           TB ER  RTB 2x10 RTB 2x 10           Sleeper str.  10x10\" 10\" x 10   Reviewed        IR str. With strap  20x5\" 10\" x 10   Reviewed        ER str. At 90/90  10x10\" 10\" x 10           PNF D2 UE flexion with TB    YTB 2x10 RTB 2x10        Standing horizontal abduction with TB     RTB 2x10        Ther Activity                                       Gait Training                                       Modalities                                                    "

## 2024-02-19 ENCOUNTER — EVALUATION (OUTPATIENT)
Dept: PHYSICAL THERAPY | Facility: CLINIC | Age: 59
End: 2024-02-19
Payer: COMMERCIAL

## 2024-02-19 DIAGNOSIS — M75.41 SUBACROMIAL IMPINGEMENT OF RIGHT SHOULDER: ICD-10-CM

## 2024-02-19 DIAGNOSIS — M19.011 ARTHRITIS OF RIGHT ACROMIOCLAVICULAR JOINT: Primary | ICD-10-CM

## 2024-02-19 DIAGNOSIS — M62.838 TRAPEZIUS MUSCLE SPASM: ICD-10-CM

## 2024-02-19 PROCEDURE — 97140 MANUAL THERAPY 1/> REGIONS: CPT | Performed by: PHYSICAL THERAPIST

## 2024-02-19 PROCEDURE — 97110 THERAPEUTIC EXERCISES: CPT | Performed by: PHYSICAL THERAPIST

## 2024-02-19 NOTE — PROGRESS NOTES
PT Evaluation     Today's date: 2024  Patient name: Leah Lora  : 1965  MRN: [id number]  Referring provider: Yeison Vazquez*  Dx:   Encounter Diagnosis     ICD-10-CM    1. Arthritis of right acromioclavicular joint  M19.011       2. Subacromial impingement of right shoulder  M75.41       3. Trapezius muscle spasm  M62.838                      Assessment  Assessment details: Leah Lora is a 58 y.o. female who presents with chronic right shoulder pain. She has participatied with working with a PT. Upon re-assessment today she has improved AROM, PROM, strength, and verbal reports of improvement. However, despite improvement she continues to have painful ROM, impaired/painful strength, and impaired GH mobility. Her impairments have lead to continued participation restrictions in overhead reaching, ADLs, household chores, and sleep. She would continue benefit from working with a skilled PT in order to increase participation in aforementioned participation restrictions. Mobilizations were performed this visit and pt's pain with comparable signs was decreased.          Patient described painful ROM in all planes negatively affecting her ability to perform a/iadls. Examination findings demonstrate decreased shoulder girdle strength an d limited/painful shoulder ROM in all planes. Patient's clinical presentation is consistent with their referring diagnosis of impingement syndrome. Extensive time spent educating Patient regarding pathophysiology, diagnostic imaging, and conservative vs surgical options. Patient would benefit from skilled physical therapy to address their aforementioned impairments, improve their level of function and to improve their overall quality of life.  Impairments: abnormal or restricted ROM, abnormal movement, activity intolerance, impaired physical strength, lacks appropriate home exercise program, pain with function and poor posture     Symptom irritability:  moderateUnderstanding of Dx/Px/POC: excellent   Prognosis: good  Prognosis details: Positive prognostic indicators include positive attitude toward recovery, good understanding of diagnosis and treatment plan options, and absence of observed red flags.  Negative prognostic indicators include chronicity of symptoms.      Goals  Short Term Goals: to be achieved by 4 weeks  1) Patient to be independent with basic HEP. - MET  2) Decrease pain to 4/10 at it's worst. - NOT MET   3) Increase UE strength by 1/2 MMT grade in all deficient planes. - PARTIALLY MET   4) Increase UE ROM by > 5 deg in all deficient planes. - NOT MET  5) Patient to report decreased sleep interruption secondary to pain. - NOT MET     Long Term Goals: to be achieved by discharge  1) FOTO equal to or greater than TBD. - NOT MET   2) Patient to be independent with comprehensive HEP. - ONGOING   3) Abolish pain for improved quality of life. - NOT MET  4) Increase UE strength to 5/5 MMT grade in all deficient planes to improve a/iadls. - NOT MET   5) Increase UE ROM to within 5 deg of contralateral UE to improve a/iadls. - PARTIALLY MET  6) Patient to report no sleep interruption secondary to pain. - NOT MET     Plan  Patient would benefit from: skilled PT  Planned modality interventions: biofeedback, cryotherapy, hydrotherapy, unattended electrical stimulation, thermotherapy: hydrocollator packs and low level laser therapy  Planned therapy interventions: activity modification, ADL retraining, behavior modification, body mechanics training, functional ROM exercises, home exercise program, IADL retraining, joint mobilization, manual therapy, massage, neuromuscular re-education, patient education, postural training, strengthening, stretching, therapeutic activities and therapeutic exercise  Frequency: 1-2x week.  Duration in weeks: 12  Plan of Care beginning date: 2/19/2024  Plan of Care expiration date: 4/15/2024  Treatment plan discussed with:  "patient      Subjective Evaluation    History of Present Illness  Mechanism of injury: RE-ASSESSMENT 24: Pt reports that she is about 40% improved since starting therapy. She continues to have high levels of pain. Continues to have difficulty with reaching behind the back, reaching overhead, getting dressed, sleeping, and ADLs.   Patient Goals  Patient goal: return to premorbid norm ; decrease pain  Pain  Current pain ratin  At best pain ratin  At worst pain ratin  Location: right UT, shoulder, lateral deltoid  Quality: soreness, spasm, deep, ache.      Diagnostic Tests  X-ray: abnormal (Right shoulder: \"Mild osteoarthritis acromioclavicular joint with downward projecting osteophyte. No significant glenohumeral degenerative change.\")      Objective     Active Range of Motion   Cervical/Thoracic Spine       Cervical  Subcranial protraction:  WFL   Subcranial retraction:   Restriction level: minimal  Flexion:  WFL  Extension:  Restriction level: minimal  Left lateral flexion:  WFL  Right lateral flexion:  WFL  Left rotation:  WFL  Right rotation:  WFL  Left Shoulder   Flexion: 160 degrees   Abduction: 150 degrees   External rotation BTH: T4   Internal rotation BTB: T6     Right Shoulder   Flexion: 152 degrees with pain  Extension: 60 degrees with pain  Abduction: 170 degrees with pain  External rotation BTH: T3 with pain  Internal rotation BTB: L1 with pain    Passive Range of Motion   Left Shoulder   Flexion: 175 degrees   Abduction: 175 degrees   External rotation 90°: 100 degrees   Internal rotation 90°: 60 degrees     Right Shoulder   Flexion: 170 degrees with pain  Extension: with pain  Abduction: 170 degrees with pain  External rotation 90°: 100 degrees with pain  Internal rotation 90°: 60 degrees with pain    Joint Play     Right Shoulder  Hypomobile in the posterior capsule and inferior capsule.     Additional Joint Play Details  Comparable signs: flexion AROM/PROM, and IR behind the back " "    Post gr 4 mobilization: decreased pain and increased ROM    Strength/Myotome Testing     Left Shoulder   Normal muscle strength    Right Shoulder     Planes of Motion   Flexion: 4+ ((+) pain)   Abduction: 4+ ((+) pain)   External rotation at 0°: 5   Internal rotation at 0°: 5 ((+) pain)     Left Elbow   Flexion: 5  Extension: 5    Right Elbow   Flexion: 5  Extension: 5    Left Wrist/Hand   Wrist extension: 5  Wrist flexion: 5    Right Wrist/Hand   Wrist extension: 5  Wrist flexion: 5             POC expires Unit limit Auth  expiration date PT/OT + Visit Limit?   4/1 N/A N/A 60                 Visit/Unit Tracking  AUTH Status:  Date 1/8 1/15 1/22 1/29 2/12 2/19         N/A Used 1 2 3 4 5 6          Remaining                    Precautions: DM      Manuals 1/8 1/15 1/22 1/29 2/12 2/19       Re-assessment      KB       GH mobs post/inf      KB gr 4 post/inf       Prone thoracic mobs      NV                    Neuro Re-Ed             Webslide: L row  GTB 2x10 3\" GTB 2x 10           Bilateral ER with scapular retraction  RTB 2x10 5\" RTB 5\" 2x 10           Prone shoulder ext.  NV  2# 2x10 3\" 2# 2x10        Prone h abd.  NV  2# 2x10 3\" 2# 2x10        Prone scaption  NV  2# 2x10 3\" 2# 2x10        Multidirectional ball stability with MB (V, H, CW/CCW circles)    GMB 20x ea. GMB 20x ea.                     Ther Ex             Patient education: pathophysiology, differential diagnosis, diagnostic imaging review, surgical vs conservative treatment GR            UBE  3' / 3' 4'/4' 4' / 4' 4' / 4' 4' / 4'       Seated thoracic ext      3s x15       TB IR  RTB 2x10 RTB 2x 10           TB ER  RTB 2x10 RTB 2x 10           Sleeper str.  10x10\" 10\" x 10   Reviewed        IR str. With strap  20x5\" 10\" x 10   Reviewed        ER str. At 90/90  10x10\" 10\" x 10           PNF D2 UE flexion with TB    YTB 2x10 RTB 2x10        Standing horizontal abduction with TB     RTB 2x10        Ther Activity                                     "   Gait Training                                       Modalities

## 2024-02-21 PROBLEM — Z12.39 SCREENING FOR BREAST CANCER: Status: RESOLVED | Noted: 2019-01-02 | Resolved: 2024-02-21

## 2024-02-26 ENCOUNTER — OFFICE VISIT (OUTPATIENT)
Dept: PHYSICAL THERAPY | Facility: CLINIC | Age: 59
End: 2024-02-26
Payer: COMMERCIAL

## 2024-02-26 DIAGNOSIS — M75.41 SUBACROMIAL IMPINGEMENT OF RIGHT SHOULDER: ICD-10-CM

## 2024-02-26 DIAGNOSIS — M19.011 ARTHRITIS OF RIGHT ACROMIOCLAVICULAR JOINT: Primary | ICD-10-CM

## 2024-02-26 DIAGNOSIS — M62.838 TRAPEZIUS MUSCLE SPASM: ICD-10-CM

## 2024-02-26 PROCEDURE — 97110 THERAPEUTIC EXERCISES: CPT | Performed by: PHYSICAL THERAPIST

## 2024-02-26 NOTE — PROGRESS NOTES
"Daily Note     Today's date: 2024  Patient name: Leah Lora  : 1965  MRN: [id number]  Referring provider: Yeison Vazquez*  Dx:   Encounter Diagnosis     ICD-10-CM    1. Arthritis of right acromioclavicular joint  M19.011       2. Subacromial impingement of right shoulder  M75.41       3. Trapezius muscle spasm  M62.838           Start Time: 1612  Stop Time: 1710  Total time in clinic (min): 58 minutes    Subjective: Patient reports that over the past 3 days her right shoulder, deltoid region has been spasming. Patient felt better after her last visit when GHJ mobilizations were performed.      Objective: See treatment diary below      Assessment: Tolerated treatment well. Patient demonstrated fatigue post treatment and would benefit from continued PT. Patient reported decreased pain and improved quality of ROM reaching behind back and overhead following addition of shoulder extension and heavy slow resistance overhead.      Plan: Continue per plan of care.  Progress treatment as tolerated.       POC expires Unit limit Auth  expiration date PT/OT + Visit Limit?    N/A N/A 60                 Visit/Unit Tracking  AUTH Status:  Date 1/8 1/15 1/22 1/29 2/12 2/19 2/26        N/A Used 1 2 3 4 5 6 7         Remaining                    Precautions: DM      Manuals 1/8 1/15 1/22 1/29 2/12 2/19 2/26      Re-assessment      KB       GH mobs post/inf      KB gr 4 post/inf NV      Prone thoracic mobs      NV                    Neuro Re-Ed             Webslide: L row  GTB 2x10 3\" GTB 2x 10           Bilateral ER with scapular retraction  RTB 2x10 5\" RTB 5\" 2x 10           Prone shoulder ext.  NV  2# 2x10 3\" 2# 2x10        Prone h abd.  NV  2# 2x10 3\" 2# 2x10        Prone scaption  NV  2# 2x10 3\" 2# 2x10        Multidirectional ball stability with MB (V, H, CW/CCW circles)    GMB 20x ea. GMB 20x ea.                     Ther Ex             Patient education: pathophysiology, differential diagnosis, " "diagnostic imaging review, surgical vs conservative treatment GR      GR      UBE  3' / 3' 4'/4' 4' / 4' 4' / 4' 4' / 4' 4' / 4'      Seated thoracic ext      3s x15       TB IR  RTB 2x10 RTB 2x 10           TB ER  RTB 2x10 RTB 2x 10           Sleeper str.  10x10\" 10\" x 10   Reviewed        IR str. With strap  20x5\" 10\" x 10   Reviewed        ER str. At 90/90  10x10\" 10\" x 10           PNF D2 UE flexion with TB    YTB 2x10 RTB 2x10        Standing horizontal abduction with TB     RTB 2x10        Shoulder extension AAROM table       2x10      Shoulder abduction heavy, slow resistance at end range with TB over door       BTB 2x10      Ther Activity                                       Gait Training                                       Modalities                                                        "

## 2024-02-27 DIAGNOSIS — E11.9 TYPE 2 DIABETES MELLITUS WITHOUT COMPLICATION, WITHOUT LONG-TERM CURRENT USE OF INSULIN (HCC): ICD-10-CM

## 2024-02-27 DIAGNOSIS — E78.5 HYPERLIPIDEMIA, UNSPECIFIED HYPERLIPIDEMIA TYPE: ICD-10-CM

## 2024-02-27 RX ORDER — LISINOPRIL 2.5 MG/1
2.5 TABLET ORAL DAILY
Qty: 90 TABLET | Refills: 1 | Status: SHIPPED | OUTPATIENT
Start: 2024-02-27 | End: 2024-02-28 | Stop reason: SDUPTHER

## 2024-02-27 RX ORDER — DULAGLUTIDE 0.75 MG/.5ML
0.75 INJECTION, SOLUTION SUBCUTANEOUS
Qty: 3 ML | Refills: 3 | Status: SHIPPED | OUTPATIENT
Start: 2024-02-27 | End: 2024-02-28 | Stop reason: SDUPTHER

## 2024-02-27 RX ORDER — ROSUVASTATIN CALCIUM 10 MG/1
10 TABLET, COATED ORAL DAILY
Qty: 90 TABLET | Refills: 1 | Status: SHIPPED | OUTPATIENT
Start: 2024-02-27

## 2024-02-28 RX ORDER — LISINOPRIL 2.5 MG/1
2.5 TABLET ORAL DAILY
Qty: 90 TABLET | Refills: 1 | Status: SHIPPED | OUTPATIENT
Start: 2024-02-28

## 2024-02-28 RX ORDER — DULAGLUTIDE 0.75 MG/.5ML
0.75 INJECTION, SOLUTION SUBCUTANEOUS
Qty: 3 ML | Refills: 3 | Status: SHIPPED | OUTPATIENT
Start: 2024-02-28

## 2024-02-28 NOTE — TELEPHONE ENCOUNTER
Transmission to pharmacy error occurred. Medication resent to pharmacy. Receipt confirmed by pharmacy.   Sent to pharmacy (2/28/2024 12:43 PM EST)

## 2024-03-05 ENCOUNTER — TELEPHONE (OUTPATIENT)
Age: 59
End: 2024-03-05

## 2024-03-05 NOTE — TELEPHONE ENCOUNTER
Caller: patient    Doctor: derick    Reason for call: would like Dr to put in referral or an MRI of R shoulder  Please advise     Call back#: [phone]

## 2024-03-07 NOTE — TELEPHONE ENCOUNTER
Caller: patient    Doctor: derick    Reason for call: patient does not understand why she needs to be seen again for a referral when conversation was had with provider about MRI in last visit     Call back#: [phone]

## 2024-03-08 LAB
ALBUMIN SERPL-MCNC: 3.8 G/DL (ref 3.5–5.7)
ALBUMIN/CREAT UR: 8
ALP SERPL-CCNC: 110 U/L (ref 35–120)
ALT SERPL-CCNC: 52 U/L
ANION GAP SERPL CALCULATED.3IONS-SCNC: 10 MMOL/L (ref 3–11)
AST SERPL-CCNC: 34 U/L
BILIRUB SERPL-MCNC: 0.4 MG/DL (ref 0.2–1)
BUN SERPL-MCNC: 9 MG/DL (ref 7–25)
CALCIUM SERPL-MCNC: 9.3 MG/DL (ref 8.5–10.1)
CHLORIDE SERPL-SCNC: 104 MMOL/L (ref 100–109)
CHOLEST SERPL-MCNC: 150 MG/DL
CHOLEST/HDLC SERPL: 3.8 {RATIO}
CO2 SERPL-SCNC: 29 MMOL/L (ref 21–31)
CREAT SERPL-MCNC: 0.63 MG/DL (ref 0.4–1.1)
CREAT UR-MCNC: 238.3 MG/DL (ref 50–200)
CYTOLOGY CMNT CVX/VAG CYTO-IMP: ABNORMAL
EST. AVERAGE GLUCOSE BLD GHB EST-MCNC: 154 MG/DL
GFR/BSA.PRED SERPLBLD CYS-BASED-ARV: 101 ML/MIN/{1.73_M2}
GLUCOSE SERPL-MCNC: 134 MG/DL (ref 65–99)
HBA1C MFR BLD: 7 %
HDLC SERPL-MCNC: 40 MG/DL (ref 23–92)
LDLC SERPL CALC-MCNC: 87 MG/DL
MICROALBUMIN UR-MCNC: 1.9 MG/DL
NONHDLC SERPL-MCNC: 110 MG/DL
POTASSIUM SERPL-SCNC: 4 MMOL/L (ref 3.5–5.2)
PROT SERPL-MCNC: 6.4 G/DL (ref 6.3–8.3)
SODIUM SERPL-SCNC: 143 MMOL/L (ref 135–145)
TRIGL SERPL-MCNC: 117 MG/DL

## 2024-03-08 NOTE — TELEPHONE ENCOUNTER
Caller: Patient    Doctor: Dr. Vazquez    Reason for call: Patient asking for call back from clinical team.  She is questioning that MRI was discussed at the last visit and is wondering why she needs to be re-evaluated.     Call back#: [phone]

## 2024-03-08 NOTE — TELEPHONE ENCOUNTER
Caller: patient     Doctor: George     Reason for call: patient does not want to come into the office to sit on the table and be told an MRI can be ordered. She feels this is unnecessary to come in,  she is requesting further explanation besides re-evaluation/clinical notes for MRI auth. She wants to to be called and told why she has to come in if nothing new will be done during the appt.     Call back#: [phone]

## 2024-03-13 ENCOUNTER — OFFICE VISIT (OUTPATIENT)
Dept: OBGYN CLINIC | Facility: CLINIC | Age: 59
End: 2024-03-13
Payer: COMMERCIAL

## 2024-03-13 VITALS
HEART RATE: 83 BPM | BODY MASS INDEX: 34.15 KG/M2 | SYSTOLIC BLOOD PRESSURE: 105 MMHG | DIASTOLIC BLOOD PRESSURE: 72 MMHG | WEIGHT: 200 LBS | HEIGHT: 64 IN

## 2024-03-13 DIAGNOSIS — M24.811 INTERNAL DERANGEMENT OF RIGHT SHOULDER: Primary | ICD-10-CM

## 2024-03-13 DIAGNOSIS — F40.240 CLAUSTROPHOBIA: ICD-10-CM

## 2024-03-13 PROCEDURE — 99214 OFFICE O/P EST MOD 30 MIN: CPT | Performed by: FAMILY MEDICINE

## 2024-03-13 RX ORDER — IBUPROFEN 800 MG/1
800 TABLET ORAL 3 TIMES DAILY PRN
Qty: 60 TABLET | Refills: 0 | Status: SHIPPED | OUTPATIENT
Start: 2024-03-13

## 2024-03-13 RX ORDER — ALPRAZOLAM 0.5 MG/1
0.5 TABLET ORAL
Qty: 2 TABLET | Refills: 0 | Status: SHIPPED | OUTPATIENT
Start: 2024-03-13

## 2024-03-13 NOTE — PROGRESS NOTES
Assessment/Plan:  Assessment/Plan   Diagnoses and all orders for this visit:    Internal derangement of right shoulder  -     MRI shoulder right wo contrast; Future  -     ibuprofen (MOTRIN) 800 mg tablet; Take 1 tablet (800 mg total) by mouth 3 (three) times a day as needed for moderate pain    Claustrophobia  -     ALPRAZolam (XANAX) 0.5 mg tablet; Take 1 tablet (0.5 mg total) by mouth 30 min pre-procedure Take 2nd dose 30 mins after 1st dose if still anxious        58-year-old right-hand-dominant female with right shoulder pain more than few years duration.  Discussed the patient physical exam, radiographs, impression, and plan.  X-rays right shoulder noted for AC joint degenerative changes without acute osseous abnormality.  Physical exam right shoulder noted for tenderness anterior aspect.  She has range of motion limited to forward flexion of 160 degrees, abduction 130 degrees, and internal rotation to the lumbar spine.  She has normal strength in rotator cuff.  Empty can test is unremarkable.  There is positive Rousseau.  Clinical impression is that she may have symptoms from rotator cuff pathology and impingement.  Symptoms have been worsening despite conservative management of steroid injections 8/16/2023 and 1/24/2024, meloxicam 15 mg once daily for 30 days, diclofenac 75 mg twice daily for 30 days, formal therapy and home exercises since 1/8/2024.  At this time I will refer for MRI of the right shoulder to evaluate for rotator cuff tear and impingement as surgical intervention may be warranted.  She may discontinue diclofenac and take ibuprofen 800 mg 3 times a day with food as needed.  She will return after having MRI done.        Subjective:   Patient ID: Leah Lora is a 58 y.o. female.  Chief Complaint   Patient presents with    Right Shoulder - Follow-up        58-year-old right-hand-dominant female following up for right shoulder more than few years duration.  She was last seen by me 7 weeks ago  "at which point she was given steroid injection to the shoulder.  She was started on diclofenac 75 mg twice daily.  She has been attending formal therapy and doing home exercises since 1/8/2024.  She reports worsening of symptoms since her last visit.  She has pain described as generalized to the shoulder, radiating distally to the upper arm, achy and throbbing and sometimes sharp, worse with abducting the arm and reaching behind the back, associated with limited range of motion, and improved with resting.  Meloxicam and diclofenac did not provide much improvement in symptoms.  She has been taking Tylenol.    Shoulder Pain  This is a new problem. The current episode started more than 1 month ago. The problem occurs daily. The problem has been gradually worsening. Associated symptoms include arthralgias. Pertinent negatives include no joint swelling, numbness or weakness. Exacerbated by: Arm abduction. She has tried rest, position changes and NSAIDs (Steroid injection, physical therapy, home exercise) for the symptoms. The treatment provided mild relief.               Review of Systems   Musculoskeletal:  Positive for arthralgias. Negative for joint swelling.   Neurological:  Negative for weakness and numbness.       Objective:  Vitals:    03/13/24 0757   BP: 105/72   Pulse: 83   Weight: 90.7 kg (200 lb)   Height: 5' 4\" (1.626 m)      Right Hand Exam     Muscle Strength   Wrist extension: 5/5   Wrist flexion: 5/5   : 5/5       Left Hand Exam     Muscle Strength   Wrist extension: 5/5   Wrist flexion: 5/5   :  5/5       Right Shoulder Exam     Tenderness   Right shoulder tenderness location: Anterior.    Range of Motion   Active abduction:  130   Forward flexion:  160   Internal rotation 0 degrees:  Lumbar     Muscle Strength   Abduction: 5/5   Internal rotation: 5/5   External rotation: 5/5   Supraspinatus: 5/5     Tests   Rousseau test: positive    Comments:  Negative empty can          Strength/Myotome " Testing     Left Wrist/Hand   Wrist extension: 5  Wrist flexion: 5    Right Wrist/Hand   Wrist extension: 5  Wrist flexion: 5      Physical Exam  Vitals and nursing note reviewed.   Constitutional:       Appearance: Normal appearance. She is well-developed. She is not ill-appearing or diaphoretic.   HENT:      Head: Normocephalic and atraumatic.      Right Ear: External ear normal.      Left Ear: External ear normal.   Eyes:      Conjunctiva/sclera: Conjunctivae normal.   Neck:      Trachea: No tracheal deviation.   Cardiovascular:      Rate and Rhythm: Normal rate.   Pulmonary:      Effort: Pulmonary effort is normal. No respiratory distress.   Abdominal:      General: There is no distension.   Musculoskeletal:         General: Tenderness present. No swelling, deformity or signs of injury.   Skin:     General: Skin is warm and dry.      Coloration: Skin is not jaundiced or pale.   Neurological:      Mental Status: She is alert and oriented to person, place, and time.   Psychiatric:         Mood and Affect: Mood normal.         Behavior: Behavior normal.         Thought Content: Thought content normal.         Judgment: Judgment normal.

## 2024-03-25 ENCOUNTER — APPOINTMENT (OUTPATIENT)
Dept: RADIOLOGY | Facility: MEDICAL CENTER | Age: 59
End: 2024-03-25
Payer: COMMERCIAL

## 2024-03-25 ENCOUNTER — OFFICE VISIT (OUTPATIENT)
Dept: FAMILY MEDICINE CLINIC | Facility: CLINIC | Age: 59
End: 2024-03-25
Payer: COMMERCIAL

## 2024-03-25 VITALS
BODY MASS INDEX: 35.12 KG/M2 | WEIGHT: 204.6 LBS | TEMPERATURE: 97.1 F | OXYGEN SATURATION: 99 % | RESPIRATION RATE: 18 BRPM | HEART RATE: 72 BPM | SYSTOLIC BLOOD PRESSURE: 118 MMHG | DIASTOLIC BLOOD PRESSURE: 82 MMHG

## 2024-03-25 DIAGNOSIS — E11.9 TYPE 2 DIABETES MELLITUS WITHOUT COMPLICATION, WITHOUT LONG-TERM CURRENT USE OF INSULIN (HCC): ICD-10-CM

## 2024-03-25 DIAGNOSIS — R06.02 SOB (SHORTNESS OF BREATH): ICD-10-CM

## 2024-03-25 DIAGNOSIS — G43.009 MIGRAINE WITHOUT AURA AND WITHOUT STATUS MIGRAINOSUS, NOT INTRACTABLE: ICD-10-CM

## 2024-03-25 DIAGNOSIS — E11.9 TYPE 2 DIABETES MELLITUS WITHOUT COMPLICATION, WITHOUT LONG-TERM CURRENT USE OF INSULIN (HCC): Primary | ICD-10-CM

## 2024-03-25 DIAGNOSIS — E78.5 HYPERLIPIDEMIA, UNSPECIFIED HYPERLIPIDEMIA TYPE: ICD-10-CM

## 2024-03-25 DIAGNOSIS — E66.01 CLASS 2 SEVERE OBESITY DUE TO EXCESS CALORIES WITH SERIOUS COMORBIDITY AND BODY MASS INDEX (BMI) OF 35.0 TO 35.9 IN ADULT (HCC): ICD-10-CM

## 2024-03-25 PROCEDURE — 99214 OFFICE O/P EST MOD 30 MIN: CPT | Performed by: PHYSICIAN ASSISTANT

## 2024-03-25 PROCEDURE — 93000 ELECTROCARDIOGRAM COMPLETE: CPT | Performed by: PHYSICIAN ASSISTANT

## 2024-03-25 PROCEDURE — 71046 X-RAY EXAM CHEST 2 VIEWS: CPT

## 2024-03-25 RX ORDER — LISINOPRIL 2.5 MG/1
2.5 TABLET ORAL DAILY
Qty: 90 TABLET | Refills: 1 | Status: SHIPPED | OUTPATIENT
Start: 2024-03-25

## 2024-03-25 NOTE — PROGRESS NOTES
Assessment/Plan:     Diagnoses and all orders for this visit:    Type 2 diabetes mellitus without complication, without long-term current use of insulin (HCC)  Comments:  Diabetes not at goal.  We will change Trulicity to Ozempic.  Orders:  -     lisinopril (ZESTRIL) 2.5 mg tablet; Take 1 tablet (2.5 mg total) by mouth daily  -     semaglutide, 0.25 or 0.5 mg/dose, (Ozempic, 0.25 or 0.5 MG/DOSE,) 2 mg/3 mL injection pen; Inject 0.375 mL (0.25 mg total) under the skin every 7 days for 30 days, THEN 0.75 mL (0.5 mg total) every 7 days for 30 days, THEN 1.5 mL (1 mg total) every 7 days.    Hyperlipidemia, unspecified hyperlipidemia type  Comments:  Is at goal continue statin therapy and low-fat diet    Class 2 severe obesity due to excess calories with serious comorbidity and body mass index (BMI) of 35.0 to 35.9 in adult   Comments:  Continue low-carb low sugar diet and exercise to promote weight loss    Migraine without aura and without status migrainosus, not intractable  Comments:  Stable continue as needed Imitrex    Type 2 diabetes mellitus without complication, without long-term current use of insulin (HCC)  Comments:  Check blood sugar daily  Orders:  -     lisinopril (ZESTRIL) 2.5 mg tablet; Take 1 tablet (2.5 mg total) by mouth daily  -     semaglutide, 0.25 or 0.5 mg/dose, (Ozempic, 0.25 or 0.5 MG/DOSE,) 2 mg/3 mL injection pen; Inject 0.375 mL (0.25 mg total) under the skin every 7 days for 30 days, THEN 0.75 mL (0.5 mg total) every 7 days for 30 days, THEN 1.5 mL (1 mg total) every 7 days.    SOB (shortness of breath)  Comments:  EKG and chest x-ray ordered. E KG shows normal sinus rhythm.  No change since 2022  Orders:  -     XR chest pa & lateral; Future  -     POCT ECG          Subjective:      Patient ID: Leah Lora is a 59 y.o. female.    Patient presents in the office for follow-up chronic conditions.  Patient has non-insulin diabetes mellitus type 2.  He has been on Trulicity for over 6  months.  She really has not lost any weight.  Globin A1c is 7.0.  He had 2 options either increase the Trulicity or switch to Ozempic.  Would like to switch to Ozempic at this time.  For hyperlipidemia she is on Crestor 10 mg.  For renal protection she is on lisinopril 2.5 mg.  For migraine headaches she takes Imitrex as needed.  Other labs showed urine microalbumin is positive.  Lipid panel is finally at goal.  Renal functions are normal.  Hepatic functions are normal..  Patient states she still having intermittent shortness of breath.  Also describes 1 episode of chest pain which was shortness across right and left anterior chest wall which lasted a day or 2.  Patient has a family history of coronary artery disease.  Check chest x-ray and EKG. EKG in the office showed normal sinus rhythm no acute changes.  Change since 2022        The following portions of the patient's history were reviewed and updated as appropriate: She   Patient Active Problem List    Diagnosis Date Noted    Primary osteoarthritis of left knee 10/18/2022    Allergic rhinitis 04/01/2020    Bilateral thumb pain 01/02/2019    Lipoma 09/29/2017    Thyroid nodule 09/29/2017    Type 2 diabetes mellitus without complication, without long-term current use of insulin ([facility]) 02/07/2017    Hyperlipidemia 02/07/2017    Class 2 severe obesity due to excess calories with serious comorbidity in adult ([facility]) 02/07/2017    Common migraine without aura 08/03/2012     Current Outpatient Medications   Medication Sig Dispense Refill    ALPRAZolam (XANAX) 0.5 mg tablet Take 1 tablet (0.5 mg total) by mouth 30 min pre-procedure Take 2nd dose 30 mins after 1st dose if still anxious 2 tablet 0    Blood Glucose Monitoring Suppl (OneTouch Verio Reflect) w/Device KIT Check blood sugars once daily. Please substitute with appropriate alternative as covered by patient's insurance. Dx: E11.65 1 kit 0    cyclobenzaprine (FLEXERIL) 10 mg tablet Take 1 tablet (10 mg total) by  mouth daily at bedtime 30 tablet 0    glucose blood (OneTouch Verio) test strip USE AS DIRECTED DAILY 50 strip 2    ibuprofen (MOTRIN) 800 mg tablet Take 1 tablet (800 mg total) by mouth 3 (three) times a day as needed for moderate pain 60 tablet 0    lisinopril (ZESTRIL) 2.5 mg tablet Take 1 tablet (2.5 mg total) by mouth daily 90 tablet 1    OneTouch Delica Lancets 33G MISC Check blood sugars once daily. Please substitute with appropriate alternative as covered by patient's insurance. Dx: E11.65 100 each 3    rosuvastatin (CRESTOR) 10 MG tablet Take 1 tablet (10 mg total) by mouth daily 90 tablet 1    semaglutide, 0.25 or 0.5 mg/dose, (Ozempic, 0.25 or 0.5 MG/DOSE,) 2 mg/3 mL injection pen Inject 0.375 mL (0.25 mg total) under the skin every 7 days for 30 days, THEN 0.75 mL (0.5 mg total) every 7 days for 30 days, THEN 1.5 mL (1 mg total) every 7 days. 12 mL 0    SUMAtriptan (IMITREX) 100 mg tablet Take 1 tablet (100 mg total) by mouth once as needed for migraine for up to 1 dose 9 tablet 0     No current facility-administered medications for this visit.     She has No Known Allergies..    Review of Systems   Constitutional:  Negative for activity change and unexpected weight change.   HENT:  Negative for ear pain and sore throat.    Eyes:  Negative for visual disturbance.   Respiratory:  Positive for shortness of breath. Negative for cough and wheezing.    Cardiovascular:  Negative for chest pain and leg swelling.   Gastrointestinal:  Negative for abdominal pain, blood in stool, constipation, diarrhea, nausea and vomiting.   Genitourinary:  Negative for difficulty urinating.   Musculoskeletal:  Negative for arthralgias and myalgias.   Skin:  Negative for rash.   Neurological:  Negative for dizziness, syncope, light-headedness and headaches.   Psychiatric/Behavioral:  Negative for self-injury, sleep disturbance and suicidal ideas. The patient is nervous/anxious.          Objective:        Physical Exam  Vitals and  nursing note reviewed.   Constitutional:       General: She is not in acute distress.     Appearance: She is well-developed. She is obese. She is not diaphoretic.   HENT:      Head: Normocephalic and atraumatic.      Right Ear: Tympanic membrane, ear canal and external ear normal.      Left Ear: Tympanic membrane, ear canal and external ear normal.      Mouth/Throat:      Pharynx: No posterior oropharyngeal erythema.   Eyes:      Conjunctiva/sclera: Conjunctivae normal.      Pupils: Pupils are equal, round, and reactive to light.   Neck:      Thyroid: No thyromegaly.      Vascular: No carotid bruit.   Cardiovascular:      Rate and Rhythm: Normal rate and regular rhythm.      Pulses: no weak pulses.           Dorsalis pedis pulses are 2+ on the right side and 2+ on the left side.      Heart sounds: Normal heart sounds. No murmur heard.     No friction rub. No gallop.   Pulmonary:      Effort: Pulmonary effort is normal. No respiratory distress.      Breath sounds: Normal breath sounds. No wheezing.   Abdominal:      General: Bowel sounds are normal. There is no distension.      Palpations: Abdomen is soft. There is no mass.      Tenderness: There is no abdominal tenderness.   Musculoskeletal:      Right lower leg: No edema.      Left lower leg: No edema.   Feet:      Right foot:      Skin integrity: No ulcer, skin breakdown, erythema, warmth, callus or dry skin.      Left foot:      Skin integrity: No ulcer, skin breakdown, erythema, warmth, callus or dry skin.   Lymphadenopathy:      Cervical: No cervical adenopathy.   Skin:     General: Skin is warm and dry.      Findings: No erythema or rash.   Neurological:      General: No focal deficit present.      Mental Status: She is alert and oriented to person, place, and time.   Psychiatric:         Mood and Affect: Mood normal.         Behavior: Behavior normal.         Thought Content: Thought content normal.         Judgment: Judgment normal.     Diabetic Foot  Exam    Patient's shoes and socks removed.    Right Foot/Ankle   Right Foot Inspection  Skin Exam: skin normal and skin intact. No dry skin, no warmth, no callus, no erythema, no maceration, no abnormal color, no pre-ulcer, no ulcer and no callus.     Toe Exam: right toe deformity. No swelling, no tenderness and erythema    Sensory   Vibration: intact  Monofilament testing: intact    Vascular  The right DP pulse is 2+.     Left Foot/Ankle  Left Foot Inspection  Skin Exam: skin normal and skin intact. No dry skin, no warmth, no erythema, no maceration, normal color, no pre-ulcer, no ulcer and no callus.     Toe Exam: left toe deformity. No swelling, no tenderness and no erythema.     Sensory   Vibration: intact  Monofilament testing: intact    Vascular  The left DP pulse is 2+.     Assign Risk Category  Deformity present  No loss of protective sensation  No weak pulses  Risk: 0

## 2024-03-26 ENCOUNTER — HOSPITAL ENCOUNTER (OUTPATIENT)
Dept: MRI IMAGING | Facility: HOSPITAL | Age: 59
Discharge: HOME | End: 2024-03-26
Attending: FAMILY MEDICINE
Payer: COMMERCIAL

## 2024-03-26 DIAGNOSIS — M24.811 INTERNAL DERANGEMENT OF RIGHT SHOULDER: ICD-10-CM

## 2024-03-26 PROCEDURE — 73221 MRI JOINT UPR EXTREM W/O DYE: CPT

## 2024-04-03 ENCOUNTER — OFFICE VISIT (OUTPATIENT)
Dept: OBGYN CLINIC | Facility: CLINIC | Age: 59
End: 2024-04-03
Payer: COMMERCIAL

## 2024-04-03 VITALS
BODY MASS INDEX: 34.83 KG/M2 | HEART RATE: 67 BPM | WEIGHT: 204 LBS | DIASTOLIC BLOOD PRESSURE: 76 MMHG | SYSTOLIC BLOOD PRESSURE: 110 MMHG | HEIGHT: 64 IN

## 2024-04-03 DIAGNOSIS — M67.921 TENDINOPATHY OF RIGHT BICEPS TENDON: ICD-10-CM

## 2024-04-03 DIAGNOSIS — M75.101 TEAR OF RIGHT SUPRASPINATUS TENDON: Primary | ICD-10-CM

## 2024-04-03 PROCEDURE — 99213 OFFICE O/P EST LOW 20 MIN: CPT | Performed by: FAMILY MEDICINE

## 2024-04-03 NOTE — PROGRESS NOTES
Assessment/Plan:  Assessment/Plan   Diagnoses and all orders for this visit:    Tear of right supraspinatus tendon    Tendinopathy of right biceps tendon          59-year-old right-hand-dominant female with right shoulder pain more than few years duration.  Discussed with patient MRI results, impression, and plan.  MRI of the right shoulder noted for full-thickness tear supraspinatus without appreciable tendon retraction or muscle atrophy, severe long head of biceps tendinosis with probable superimposed partial tear.  Clinical impression is that she has symptoms from combination of rotator cuff pathology and biceps tendinopathy.  Symptoms persist despite conservative management of formal therapy and NSAIDs.  I discussed with patient at this time is reasonable consult with orthopedic surgeon.  Patient states she will follow-up with her surgeon who has performed surgery on her knee.  She may continue being active with range of motion exercises but refrain from heavy lifting.  She will follow-up with me as needed.      Subjective:   Patient ID: Leah Lora is a 59 y.o. female.  Chief Complaint   Patient presents with    Right Shoulder - Follow-up        59-year-old right-hand-dominant female following up for right shoulder pain more than few years duration.  She was last seen by me 3 weeks ago at which point she was referred for MRI of the right shoulder.  She denies changes in symptoms since her last visit.  She has pain described as generalized to the shoulder worse at the superior and lateral aspects, radiating distally along the lateral aspect upper arm, worse moving the arm particular elevating above shoulder level, associated with limited range of motion, and improved with resting.  She has been taking ibuprofen and Aleve to help with symptoms.  She denies improvement with formal therapy.    Shoulder Pain  This is a chronic problem. The current episode started more than 1 year ago. The problem occurs daily.  "The problem has been unchanged. Associated symptoms include anorexia. Pertinent negatives include no joint swelling, numbness or weakness. Exacerbated by: Arm use. She has tried rest, position changes and NSAIDs (Physical therapy, home exercise) for the symptoms. The treatment provided mild relief.             Review of Systems   Gastrointestinal:  Positive for anorexia.   Musculoskeletal:  Negative for joint swelling.   Neurological:  Negative for weakness and numbness.       Objective:  Vitals:    04/03/24 0921   BP: 110/76   Pulse: 67   Weight: 92.5 kg (204 lb)   Height: 5' 4\" (1.626 m)      Ortho Exam    Physical Exam  Vitals and nursing note reviewed.   Constitutional:       Appearance: Normal appearance. She is well-developed. She is not ill-appearing or diaphoretic.   HENT:      Head: Normocephalic and atraumatic.      Right Ear: External ear normal.      Left Ear: External ear normal.   Eyes:      Conjunctiva/sclera: Conjunctivae normal.   Neck:      Trachea: No tracheal deviation.   Cardiovascular:      Rate and Rhythm: Normal rate.   Pulmonary:      Effort: Pulmonary effort is normal. No respiratory distress.   Abdominal:      General: There is no distension.   Skin:     General: Skin is warm and dry.      Coloration: Skin is not jaundiced or pale.   Neurological:      Mental Status: She is alert and oriented to person, place, and time.   Psychiatric:         Mood and Affect: Mood normal.         Behavior: Behavior normal.         Thought Content: Thought content normal.         Judgment: Judgment normal.         I have personally reviewed pertinent films in PACS and my interpretation is  .  Supraspinatus tear without retraction        "

## 2024-04-29 ENCOUNTER — CONSULT (OUTPATIENT)
Dept: FAMILY MEDICINE CLINIC | Facility: CLINIC | Age: 59
End: 2024-04-29
Payer: COMMERCIAL

## 2024-04-29 VITALS
RESPIRATION RATE: 16 BRPM | BODY MASS INDEX: 34.49 KG/M2 | DIASTOLIC BLOOD PRESSURE: 70 MMHG | TEMPERATURE: 98.7 F | OXYGEN SATURATION: 97 % | SYSTOLIC BLOOD PRESSURE: 114 MMHG | HEART RATE: 84 BPM | HEIGHT: 64 IN | WEIGHT: 202 LBS

## 2024-04-29 DIAGNOSIS — Z01.818 PRE-OP EXAMINATION: Primary | ICD-10-CM

## 2024-04-29 DIAGNOSIS — E11.9 TYPE 2 DIABETES MELLITUS WITHOUT COMPLICATION, WITHOUT LONG-TERM CURRENT USE OF INSULIN (HCC): ICD-10-CM

## 2024-04-29 DIAGNOSIS — E66.01 CLASS 2 SEVERE OBESITY DUE TO EXCESS CALORIES WITH SERIOUS COMORBIDITY AND BODY MASS INDEX (BMI) OF 35.0 TO 35.9 IN ADULT (HCC): ICD-10-CM

## 2024-04-29 DIAGNOSIS — Z78.0 POST-MENOPAUSE: ICD-10-CM

## 2024-04-29 DIAGNOSIS — E78.5 HYPERLIPIDEMIA, UNSPECIFIED HYPERLIPIDEMIA TYPE: ICD-10-CM

## 2024-04-29 DIAGNOSIS — M25.811 SHOULDER IMPINGEMENT, RIGHT: ICD-10-CM

## 2024-04-29 PROBLEM — J31.0 RHINITIS: Status: RESOLVED | Noted: 2017-02-07 | Resolved: 2024-04-29

## 2024-04-29 PROBLEM — J31.0 RHINITIS: Status: ACTIVE | Noted: 2017-02-07

## 2024-04-29 PROBLEM — M79.645 BILATERAL THUMB PAIN: Status: RESOLVED | Noted: 2019-01-02 | Resolved: 2024-04-29

## 2024-04-29 PROBLEM — M79.644 BILATERAL THUMB PAIN: Status: RESOLVED | Noted: 2019-01-02 | Resolved: 2024-04-29

## 2024-04-29 PROCEDURE — 93000 ELECTROCARDIOGRAM COMPLETE: CPT | Performed by: INTERNAL MEDICINE

## 2024-04-29 PROCEDURE — 99214 OFFICE O/P EST MOD 30 MIN: CPT | Performed by: INTERNAL MEDICINE

## 2024-04-29 PROCEDURE — 3725F SCREEN DEPRESSION PERFORMED: CPT | Performed by: INTERNAL MEDICINE

## 2024-04-29 NOTE — ASSESSMENT & PLAN NOTE
Lab Results   Component Value Date    HGBA1C 7.0 (H) 03/08/2024   she is a type II diabetic and prior to this A1c she was running in the sixes. He still stable for surgery. Possibly her steroids have made her number go up

## 2024-05-01 ENCOUNTER — TELEPHONE (OUTPATIENT)
Dept: FAMILY MEDICINE CLINIC | Facility: CLINIC | Age: 59
End: 2024-05-01

## 2024-05-01 ENCOUNTER — TELEPHONE (OUTPATIENT)
Age: 59
End: 2024-05-01

## 2024-05-01 NOTE — TELEPHONE ENCOUNTER
Called and LMOM for Emely at the surgeons office to notify him that their form, office visit notes, labs and EKG were all faxed over.     #[phone]  Faxed to @[phone]

## 2024-05-01 NOTE — TELEPHONE ENCOUNTER
Patient called in stating her surgeon's office hasn't gotten the pre op clearance yet (We were given the incorrect fax# the first time). There's a form, office note and most recent labs including A1C.     Form wasn't filled out at DOS, but was in the green folder. Given to PCP to fill out and will call office when faxed.     Att: Emely (male)  PH#[phone] ext [phone]  Fax#[phone]

## 2024-05-01 NOTE — TELEPHONE ENCOUNTER
Emely called from 's office requesting the labs ,EKG and last office notes to be faxed.Information was faxed to [phone].

## 2024-05-20 ENCOUNTER — TELEPHONE (OUTPATIENT)
Dept: FAMILY MEDICINE CLINIC | Facility: MEDICAL CENTER | Age: 59
End: 2024-05-20

## 2024-05-20 NOTE — TELEPHONE ENCOUNTER
Patient had shoulder surgery and is on Naproxen and Oxycodone. Patient can start her Ozempic Wednesday but wanted to be sure its ok to take with these medications Please advise.

## 2024-06-05 ENCOUNTER — TELEPHONE (OUTPATIENT)
Dept: FAMILY MEDICINE CLINIC | Facility: CLINIC | Age: 59
End: 2024-06-05

## 2024-06-24 DIAGNOSIS — E11.9 TYPE 2 DIABETES MELLITUS WITHOUT COMPLICATION, WITHOUT LONG-TERM CURRENT USE OF INSULIN (HCC): ICD-10-CM

## 2024-06-25 ENCOUNTER — TELEPHONE (OUTPATIENT)
Age: 59
End: 2024-06-25

## 2024-06-25 ENCOUNTER — EVALUATION (OUTPATIENT)
Dept: PHYSICAL THERAPY | Facility: CLINIC | Age: 59
End: 2024-06-25
Payer: COMMERCIAL

## 2024-06-25 DIAGNOSIS — S43.491D OTHER SPRAIN OF RIGHT SHOULDER JOINT, SUBSEQUENT ENCOUNTER: ICD-10-CM

## 2024-06-25 DIAGNOSIS — G89.29 CHRONIC RIGHT SHOULDER PAIN: ICD-10-CM

## 2024-06-25 DIAGNOSIS — M25.511 CHRONIC RIGHT SHOULDER PAIN: ICD-10-CM

## 2024-06-25 DIAGNOSIS — E11.9 TYPE 2 DIABETES MELLITUS WITHOUT COMPLICATION, WITHOUT LONG-TERM CURRENT USE OF INSULIN (HCC): Primary | ICD-10-CM

## 2024-06-25 DIAGNOSIS — M75.21 BICIPITAL TENDINITIS OF RIGHT SHOULDER: ICD-10-CM

## 2024-06-25 DIAGNOSIS — S43.431D SUPERIOR GLENOID LABRUM LESION OF RIGHT SHOULDER, SUBSEQUENT ENCOUNTER: ICD-10-CM

## 2024-06-25 DIAGNOSIS — M75.41 IMPINGEMENT SYNDROME OF RIGHT SHOULDER: Primary | ICD-10-CM

## 2024-06-25 PROCEDURE — 97162 PT EVAL MOD COMPLEX 30 MIN: CPT | Performed by: PHYSICAL THERAPIST

## 2024-06-25 PROCEDURE — 97110 THERAPEUTIC EXERCISES: CPT | Performed by: PHYSICAL THERAPIST

## 2024-06-25 RX ORDER — SEMAGLUTIDE 0.68 MG/ML
INJECTION, SOLUTION SUBCUTANEOUS
Qty: 12 ML | Refills: 0 | Status: SHIPPED | OUTPATIENT
Start: 2024-06-25 | End: 2024-06-25

## 2024-06-25 NOTE — PROGRESS NOTES
PT Evaluation     Today's date: 2024  Patient name: Leah Lora  : 1965  MRN: [id number]  Referring provider: Brian Kwok DO  Dx:   Encounter Diagnosis     ICD-10-CM    1. Impingement syndrome of right shoulder  M75.41       2. Chronic right shoulder pain  M25.511     G89.29       3. Bicipital tendinitis of right shoulder  M75.21       4. Superior glenoid labrum lesion of right shoulder, subsequent encounter  S43.431D       5. Other sprain of right shoulder joint, subsequent encounter  S43.491D           Start Time: 734  Stop Time: 815  Total time in clinic (min): 41 minutes    Assessment  Impairments: abnormal or restricted ROM, activity intolerance, impaired physical strength, lacks appropriate home exercise program, pain with function and safety issue    Assessment details: Leah Lora is a 59 y.o. female who presents with c/o chronic right shoulder pain with limited ROM. Patient failed conservative treatment and opted to undergo elective surgery. Patient is currently s/p R shoulder arthroscopic RC-R, open subpectoralis biceps tenodesis, debridement of anterior/superior/posterior labrum tears, SAD (DOS: 5/15/24). Patient denies experiencing acute post-operative complications. Patient's pain is being managed with medication. Patient's quality of sleep is poor. Examination findings demonstrate appropriate wound healing and limited right shoulder PROM. Deferred UE strength testing and AROM secondary to recency of surgery. Patient is working full-time at this time, but is strictly working from home. Patient remains limited with A/IADLS. Patient has been educated in post-op contraindications / precautions, wound care, and rehab overview. Patient would benefit from skilled physical therapy to address their aforementioned impairments, improve their level of function and to improve their overall quality of life.  Understanding of Dx/Px/POC: excellent     Prognosis: excellent    Goals  Short Term  Goals: to be achieved by 4 weeks  1) Patient to be independent with basic HEP.  2) Decrease pain to 4/10 at its worst.  3) Increase shoulder flexion PROM to 120 deg.  4) Increase shoulder abduction PROM to 120 deg.  5) Increase shoulder ER PROM at 30 deg abduction to 60 deg.  6) Increase shoulder IR PROM at 30 deg abduction to 60 deg.  7) Patient to report decreased sleep interruption secondary to pain.    Long Term Goals: to be achieved by discharge  1) FOTO equal to or greater than 67.  2) Patient to be independent with comprehensive HEP.  3) Abolish pain for improved quality of life.  4) Increase shoulder ROM to within 5 deg of contralateral UE to improve a/iadls.   5) Increase shoulder strength to 5/5 MMT grade in all planes to improve a/iadls.   6) Patient to report no sleep interruption secondary to pain.  7) Patient to return to full duty at work.     Plan  Patient would benefit from: skilled PT  Planned modality interventions: cryotherapy, hydrotherapy, TENS, thermotherapy: hydrocollator packs and low level laser therapy    Planned therapy interventions: activity modification, ADL retraining, ADL training, behavior modification, body mechanics training, dressing changes, functional ROM exercises, home exercise program, IADL retraining, joint mobilization, manual therapy, massage, neuromuscular re-education, patient education, postural training, self care, strengthening, stretching, therapeutic activities and therapeutic exercise    Frequency: 1-2x week.  Duration in weeks: 12  Plan of Care beginning date: 6/25/2024  Plan of Care expiration date: 9/17/2024  Treatment plan discussed with: patient        Subjective Evaluation    History of Present Illness  Mechanism of injury: Patient presents with c/o chronic right shoulder pain and failed conservative treatment, including physical therapy and steroid injections. Patient ultimately opted to undergo surgical intervention and currently presents s/p R shoulder  arthroscopic RC-R, open subpectoralis biceps tenodesis, debridement of anterior/superior/posterior labrum tears, SAD (DOS: 5/15/24). Patient denies experiencing acute post-postoperative complications. Patient's pain is being managed with Tylenol and Tramadol PRN. Patient's quality of sleep remains poor. Patient has intermittent tingling/numbness in her right hand. Patient has started  weaning out of her sling beginning at 2 weeks. Patient's next f/u appointment with her surgeon is scheduled for 7/15/24.  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     Active Range of Motion     Left Elbow   Flexion: WFL  Extension: WFL    Right Elbow   Flexion: WFL  Extension: WFL    Left Wrist   Normal active range of motion    Right Wrist   Normal active range of motion    Additional Active Range of Motion Details  Deferred shoulder AROM secondary to recency of surgery    Passive Range of Motion   Left Shoulder   Flexion: 175 degrees   Abduction: 175 degrees   External rotation 90°: 95 degrees   Internal rotation 90°: 65 degrees     Right Shoulder   Flexion: 135 degrees with pain  Abduction: 95 degrees with pain  External rotation 45°: 50 degrees with pain  Internal rotation 45°: Right shoulder passive internal rotation at 45 degrees: to chest. with  pain    Strength/Myotome Testing     Additional Strength Details  Deferred UE strength testing secondary to recency of surgery      Flowsheet Rows      Flowsheet Row Most Recent Value   PT/OT G-Codes    Current Score 34   Projected Score 67                 POC expires Unit limit Auth  expiration date PT/OT + Visit Limit?   9/17/24 N/A N/A 60                 Visit/Unit Tracking  AUTH Status:  Date 6/25              N/A Used 8               Remaining  52                Precautions: s/p R shoulder arthroscopic RC-R, open subpectoralis biceps tenodesis, debridement of anterior/superior/posterior labrum tears, SAD (DOS: 5/15/24) ; follow RC-R protocol ; Sling x 6 wks      Manuals 6/25            R shoulder PROM per protocol             R elbow PROM             Gr. II-IV GHJ AP, Inf mobs                          Neuro Re-Ed             Scap squeezes Reviewed            Prone row AROM to neutral                                                                              Ther Ex             Patient education: pathophysiology, surgical/rehab overview, post-operative contraindications/precautions,  signs of infection, wound care/edema management, HEP review GR            Pendulums (CW/CCW circles, H, V) Reviewed            Elbow AROM Reviewed            Wrist AROM Reviewed            Gripping Reviewed            Supine shoulder flexion AAROM with opp UE             Supine ER AAROM with SPC             Scaption in side lying with UE ranger             Scapular protraction/retraction in side lying with UE ranger             Submaximal, pain-free IR, ER isometrics             ER AROM in side lying                                                                 Ther Activity                                       Gait Training                                       Modalities

## 2024-06-25 NOTE — TELEPHONE ENCOUNTER
Pharmacy called to verify sig on ozempic Rx.  Pt should already be on 1 mg (according to history) and ozempic comes in a 1 mg strength so she would not have to dial up.  Please verify if directions are correct.

## 2024-06-25 NOTE — LETTER
2024    Brian Kwok DO  [address] [address]    Patient: Leah Lora   YOB: 1965   Date of Visit: 2024     Encounter Diagnosis     ICD-10-CM    1. Impingement syndrome of right shoulder  M75.41       2. Chronic right shoulder pain  M25.511     G89.29       3. Bicipital tendinitis of right shoulder  M75.21       4. Superior glenoid labrum lesion of right shoulder, subsequent encounter  S43.431D       5. Other sprain of right shoulder joint, subsequent encounter  S43.491D           Dear Dr. Kwok:    Thank you for your recent referral of Leah Lora. Please review the attached evaluation summary from Leah's recent visit.     Please verify that you agree with the plan of care by signing the attached order.     If you have any questions or concerns, please do not hesitate to call.     I sincerely appreciate the opportunity to share in the care of one of your patients and hope to have another opportunity to work with you in the near future.       Sincerely,    Nir Shanks, PT      Referring Provider:      I certify that I have read the below Plan of Care and certify the need for these services furnished under this plan of treatment while under my care.                    Brian Kwok DO  [address] [place] [id number]  Via Fax: [phone]          PT Evaluation     Today's date: 2024  Patient name: Leah Lora  : 1965  MRN: [id number]  Referring provider: Brian Kwok DO  Dx:   Encounter Diagnosis     ICD-10-CM    1. Impingement syndrome of right shoulder  M75.41       2. Chronic right shoulder pain  M25.511     G89.29       3. Bicipital tendinitis of right shoulder  M75.21       4. Superior glenoid labrum lesion of right shoulder, subsequent encounter  S43.431D       5. Other sprain of right shoulder joint, subsequent encounter  S43.491D           Start Time: 734  Stop Time: 0815  Total time in clinic (min): 41  minutes    Assessment  Impairments: abnormal or restricted ROM, activity intolerance, impaired physical strength, lacks appropriate home exercise program, pain with function and safety issue    Assessment details: Leah Lora is a 59 y.o. female who presents with c/o chronic right shoulder pain with limited ROM. Patient failed conservative treatment and opted to undergo elective surgery. Patient is currently s/p R shoulder arthroscopic RC-R, open subpectoralis biceps tenodesis, debridement of anterior/superior/posterior labrum tears, SAD (DOS: 5/15/24). Patient denies experiencing acute post-operative complications. Patient's pain is being managed with medication. Patient's quality of sleep is poor. Examination findings demonstrate appropriate wound healing and limited right shoulder PROM. Deferred UE strength testing and AROM secondary to recency of surgery. Patient is working full-time at this time, but is strictly working from home. Patient remains limited with A/IADLS. Patient has been educated in post-op contraindications / precautions, wound care, and rehab overview. Patient would benefit from skilled physical therapy to address their aforementioned impairments, improve their level of function and to improve their overall quality of life.  Understanding of Dx/Px/POC: excellent     Prognosis: excellent    Goals  Short Term Goals: to be achieved by 4 weeks  1) Patient to be independent with basic HEP.  2) Decrease pain to 4/10 at its worst.  3) Increase shoulder flexion PROM to 120 deg.  4) Increase shoulder abduction PROM to 120 deg.  5) Increase shoulder ER PROM at 30 deg abduction to 60 deg.  6) Increase shoulder IR PROM at 30 deg abduction to 60 deg.  7) Patient to report decreased sleep interruption secondary to pain.    Long Term Goals: to be achieved by discharge  1) FOTO equal to or greater than 67.  2) Patient to be independent with comprehensive HEP.  3) Abolish pain for improved quality of  life.  4) Increase shoulder ROM to within 5 deg of contralateral UE to improve a/iadls.   5) Increase shoulder strength to 5/5 MMT grade in all planes to improve a/iadls.   6) Patient to report no sleep interruption secondary to pain.  7) Patient to return to full duty at work.     Plan  Patient would benefit from: skilled PT  Planned modality interventions: cryotherapy, hydrotherapy, TENS, thermotherapy: hydrocollator packs and low level laser therapy    Planned therapy interventions: activity modification, ADL retraining, ADL training, behavior modification, body mechanics training, dressing changes, functional ROM exercises, home exercise program, IADL retraining, joint mobilization, manual therapy, massage, neuromuscular re-education, patient education, postural training, self care, strengthening, stretching, therapeutic activities and therapeutic exercise    Frequency: 1-2x week.  Duration in weeks: 12  Plan of Care beginning date: 6/25/2024  Plan of Care expiration date: 9/17/2024  Treatment plan discussed with: patient        Subjective Evaluation    History of Present Illness  Mechanism of injury: Patient presents with c/o chronic right shoulder pain and failed conservative treatment, including physical therapy and steroid injections. Patient ultimately opted to undergo surgical intervention and currently presents s/p R shoulder arthroscopic RC-R, open subpectoralis biceps tenodesis, debridement of anterior/superior/posterior labrum tears, SAD (DOS: 5/15/24). Patient denies experiencing acute post-postoperative complications. Patient's pain is being managed with Tylenol and Tramadol PRN. Patient's quality of sleep remains poor. Patient has intermittent tingling/numbness in her right hand. Patient has started  weaning out of her sling beginning at 2 weeks. Patient's next f/u appointment with her surgeon is scheduled for 7/15/24.  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     Active Range of Motion     Left Elbow   Flexion: WFL  Extension: WFL    Right Elbow   Flexion: WFL  Extension: WFL    Left Wrist   Normal active range of motion    Right Wrist   Normal active range of motion    Additional Active Range of Motion Details  Deferred shoulder AROM secondary to recency of surgery    Passive Range of Motion   Left Shoulder   Flexion: 175 degrees   Abduction: 175 degrees   External rotation 90°: 95 degrees   Internal rotation 90°: 65 degrees     Right Shoulder   Flexion: 135 degrees with pain  Abduction: 95 degrees with pain  External rotation 45°: 50 degrees with pain  Internal rotation 45°: Right shoulder passive internal rotation at 45 degrees: to chest. with pain    Strength/Myotome Testing     Additional Strength Details  Deferred UE strength testing secondary to recency of surgery      Flowsheet Rows      Flowsheet Row Most Recent Value   PT/OT G-Codes    Current Score 34   Projected Score 67                 POC expires Unit limit Auth  expiration date PT/OT + Visit Limit?   24 N/A N/A 60                 Visit/Unit Tracking  AUTH Status:  Date               N/A Used 8               Remaining  52                Precautions: s/p R shoulder arthroscopic RC-R, open subpectoralis biceps tenodesis, debridement of  anterior/superior/posterior labrum tears, SAD (DOS: 5/15/24) ; follow RC-R protocol ; Sling x 6 wks      Manuals 6/25            R shoulder PROM per protocol             R elbow PROM             Gr. II-IV GHJ AP, Inf mobs                          Neuro Re-Ed             Scap squeezes Reviewed            Prone row AROM to neutral                                                                              Ther Ex             Patient education: pathophysiology, surgical/rehab overview, post-operative contraindications/precautions,  signs of infection, wound care/edema management, HEP review GR            Pendulums (CW/CCW circles, H, V) Reviewed            Elbow AROM Reviewed            Wrist AROM Reviewed            Gripping Reviewed            Supine shoulder flexion AAROM with opp UE             Supine ER AAROM with SPC             Scaption in side lying with UE ranger             Scapular protraction/retraction in side lying with UE ranger             Submaximal, pain-free IR, ER isometrics             ER AROM in side lying                                                                 Ther Activity                                       Gait Training                                       Modalities

## 2024-06-27 ENCOUNTER — OFFICE VISIT (OUTPATIENT)
Dept: PHYSICAL THERAPY | Facility: CLINIC | Age: 59
End: 2024-06-27
Payer: COMMERCIAL

## 2024-06-27 DIAGNOSIS — M25.511 CHRONIC RIGHT SHOULDER PAIN: ICD-10-CM

## 2024-06-27 DIAGNOSIS — S43.431D SUPERIOR GLENOID LABRUM LESION OF RIGHT SHOULDER, SUBSEQUENT ENCOUNTER: ICD-10-CM

## 2024-06-27 DIAGNOSIS — M75.21 BICIPITAL TENDINITIS OF RIGHT SHOULDER: ICD-10-CM

## 2024-06-27 DIAGNOSIS — M75.41 IMPINGEMENT SYNDROME OF RIGHT SHOULDER: Primary | ICD-10-CM

## 2024-06-27 DIAGNOSIS — G89.29 CHRONIC RIGHT SHOULDER PAIN: ICD-10-CM

## 2024-06-27 DIAGNOSIS — S43.491D OTHER SPRAIN OF RIGHT SHOULDER JOINT, SUBSEQUENT ENCOUNTER: ICD-10-CM

## 2024-06-27 PROCEDURE — 97140 MANUAL THERAPY 1/> REGIONS: CPT

## 2024-06-27 PROCEDURE — 97110 THERAPEUTIC EXERCISES: CPT

## 2024-06-27 PROCEDURE — 97112 NEUROMUSCULAR REEDUCATION: CPT

## 2024-06-27 NOTE — PROGRESS NOTES
Daily Note     Today's date: 2024  Patient name: Leah Lora  : 1965  MRN: [id number]  Referring provider: Brian Kwok DO  Dx:   Encounter Diagnosis     ICD-10-CM    1. Impingement syndrome of right shoulder  M75.41       2. Chronic right shoulder pain  M25.511     G89.29       3. Bicipital tendinitis of right shoulder  M75.21       4. Superior glenoid labrum lesion of right shoulder, subsequent encounter  S43.431D       5. Other sprain of right shoulder joint, subsequent encounter  S43.491D           Start Time: 1730  Stop Time:   Total time in clinic (min): 42 minutes    Subjective: aches about a 6/10.       Objective: See treatment diary below      Assessment:  at this time patient is 6 weeks a 1 day s/p R shoulder arthroscopic RC-R, open subpectoralis biceps tenodesis, debridement of anterior/superior/posterior labrum tears, SAD (DOS: 5/15/24)   noted some increase in soreness with ER AAROM with SPC at this time. Tolerated treatment well. Patient exhibited good technique with therapeutic exercises and would benefit from continued PT  Educated that she may experience some DOMS.   Advised to continue to ice at home with CP 10 -20 min on and 60 min off prior to additional application.     Plan: Continue per plan of care.      POC expires Unit limit Auth  expiration date PT/OT + Visit Limit?   24 N/A N/A 60                 Visit/Unit Tracking  AUTH Status:  Date              N/A Used 8 9               Remaining  52 51               Precautions: s/p R shoulder arthroscopic RC-R, open subpectoralis biceps tenodesis, debridement of anterior/superior/posterior labrum tears, SAD (DOS: 5/15/24) ; follow RC-R protocol ; Sling x 6 wks   NO UBE       Manuals            R shoulder PROM per protocol  SC gentle            R elbow PROM             Gr. II-IV GHJ AP, Inf mobs                          Neuro Re-Ed             Scap squeezes Reviewed            Prone row AROM to neutral   "10x                                                                             Ther Ex             Patient education: pathophysiology, surgical/rehab overview, post-operative contraindications/precautions,  signs of infection, wound care/edema management, HEP review GR            Pendulums (CW/CCW circles, H, V) Reviewed Reviewed.            Elbow AROM Reviewed 2x 10            Wrist AROM Reviewed 2x 10            Gripping Reviewed Green 2x 10            Supine shoulder flexion AAROM with opp UE  2x 10 5\"            Supine ER AAROM with SPC  5\"  15x            Scaption in side lying with UE ranger  2x 10            Scapular protraction/retraction in side lying with UE ranger  Gentle VC's 2x 10             Submaximal, pain-free IR, ER isometrics  NV           ER AROM in side lying  2x 10                                                                Ther Activity                                       Gait Training                                       Modalities                                            "

## 2024-07-01 ENCOUNTER — OFFICE VISIT (OUTPATIENT)
Dept: PHYSICAL THERAPY | Facility: CLINIC | Age: 59
End: 2024-07-01
Payer: COMMERCIAL

## 2024-07-01 DIAGNOSIS — M75.41 IMPINGEMENT SYNDROME OF RIGHT SHOULDER: Primary | ICD-10-CM

## 2024-07-01 PROCEDURE — 97140 MANUAL THERAPY 1/> REGIONS: CPT | Performed by: PHYSICAL THERAPIST

## 2024-07-01 PROCEDURE — 97110 THERAPEUTIC EXERCISES: CPT | Performed by: PHYSICAL THERAPIST

## 2024-07-01 NOTE — PROGRESS NOTES
Daily Note     Today's date: 2024  Patient name: Leah Lora  : 1965  MRN: [id number]  Referring provider: Brian Kwok DO  Dx:   Encounter Diagnosis     ICD-10-CM    1. Impingement syndrome of right shoulder  M75.41           Start Time: 1730  Stop Time: 1840  Total time in clinic (min): 70 minutes    Subjective: Patient reports that her shoulder was sore after her last visit.      Objective: See treatment diary below      Assessment: Tolerated treatment well. Patient demonstrated fatigue post treatment and would benefit from continued PT. Patient with improving right shoulder PROM in all planes. Patient overall progressing well per POC.      Plan: Continue per plan of care.  Progress treatment as tolerated.   Progress treament per protocol.      POC expires Unit limit Auth  expiration date PT/OT + Visit Limit?   24 N/A N/A 60                 Visit/Unit Tracking  AUTH Status:  Date             N/A Used 8 9  10             Remaining  52 51 50              Precautions: s/p R shoulder arthroscopic RC-R, open subpectoralis biceps tenodesis, debridement of anterior/superior/posterior labrum tears, SAD (DOS: 5/15/24) ; follow RC-R protocol ; Sling x 6 wks   NO UBE       Manuals           R shoulder PROM per protocol  SC gentle  GR          R elbow PROM             Gr. II-IV GHJ AP, Inf mobs                          Neuro Re-Ed             Scap squeezes Reviewed            Prone row AROM to neutral  10x                                                                             Ther Ex             Patient education: pathophysiology, surgical/rehab overview, post-operative contraindications/precautions,  signs of infection, wound care/edema management, HEP review GR  HEP review          Pendulums (CW/CCW circles, H, V) Reviewed Reviewed.            Elbow AROM Reviewed 2x 10            Wrist AROM Reviewed 2x 10            Gripping Reviewed Green 2x 10            Supine shoulder  "flexion AAROM with opp UE  2x 10 5\"            Supine ER AAROM with SPC  5\"  15x            Scaption in side lying with UE ranger  2x 10  2x10          Scapular protraction/retraction in side lying with UE ranger  Gentle VC's 2x 10   2x10          Submaximal, pain-free IR, ER isometrics  NV 20x5\"          ER AROM in side lying  2x 10  2x10          Table slides: F, scap, ER   10x10\" ea.          Pulleys   20x5\"          Fingerladder   10x5\"                       Ther Activity                                       Gait Training                                       Modalities                                       Access Code: [email or web address]  URL: [email or web address]  Date: 07/01/2024  Prepared by: Nir Shanks    Exercises  - Seated Shoulder External Rotation PROM on Table  - 1 x daily - 7 x weekly - 2 sets - 10 reps - 10 seconds hold  - Seated Shoulder Scaption Slide at Table Top with Forearm in Neutral  - 1 x daily - 7 x weekly - 2 sets - 10 reps - 10 seconds hold  - Seated Shoulder Flexion Towel Slide at Table Top Full Range of Motion  - 1 x daily - 7 x weekly - 2 sets - 10 reps - 10 seconds hold  - Supine Shoulder External Rotation with Dowel  - 1 x daily - 7 x weekly - 2 sets - 10 reps - 10 seconds hold  - Supine Shoulder Flexion AAROM  - 1 x daily - 7 x weekly - 2 sets - 10 reps - 10 seconds hold  - Shoulder Flexion Wall Slide with Towel  - 1 x daily - 7 x weekly - 2 sets - 10 reps - 5 seconds hold  - Standing Isometric Shoulder External Rotation with Doorway  - 1 x daily - 4 x weekly - 2 sets - 10 reps - 5 seconds hold  - Standing Isometric Shoulder Internal Rotation at Doorway  - 1 x daily - 4 x weekly - 2 sets - 10 reps - 5 seconds hold  - Sidelying Shoulder External Rotation with Dumbbell  - 1 x daily - 4 x weekly - 2 sets - 10 reps       "

## 2024-07-03 ENCOUNTER — APPOINTMENT (OUTPATIENT)
Dept: PHYSICAL THERAPY | Facility: CLINIC | Age: 59
End: 2024-07-03
Payer: COMMERCIAL

## 2024-07-08 ENCOUNTER — OFFICE VISIT (OUTPATIENT)
Dept: PHYSICAL THERAPY | Facility: CLINIC | Age: 59
End: 2024-07-08
Payer: COMMERCIAL

## 2024-07-08 DIAGNOSIS — S43.491D OTHER SPRAIN OF RIGHT SHOULDER JOINT, SUBSEQUENT ENCOUNTER: ICD-10-CM

## 2024-07-08 DIAGNOSIS — G89.29 CHRONIC RIGHT SHOULDER PAIN: ICD-10-CM

## 2024-07-08 DIAGNOSIS — M75.21 BICIPITAL TENDINITIS OF RIGHT SHOULDER: ICD-10-CM

## 2024-07-08 DIAGNOSIS — M25.511 CHRONIC RIGHT SHOULDER PAIN: ICD-10-CM

## 2024-07-08 DIAGNOSIS — M75.41 IMPINGEMENT SYNDROME OF RIGHT SHOULDER: Primary | ICD-10-CM

## 2024-07-08 DIAGNOSIS — S43.431D SUPERIOR GLENOID LABRUM LESION OF RIGHT SHOULDER, SUBSEQUENT ENCOUNTER: ICD-10-CM

## 2024-07-08 PROCEDURE — 97110 THERAPEUTIC EXERCISES: CPT | Performed by: PHYSICAL THERAPIST

## 2024-07-08 PROCEDURE — 97140 MANUAL THERAPY 1/> REGIONS: CPT | Performed by: PHYSICAL THERAPIST

## 2024-07-08 NOTE — PROGRESS NOTES
Daily Note     Today's date: 2024  Patient name: Leah Lora  : 1965  MRN: [id number]  Referring provider: Brian Kwok DO  Dx:   Encounter Diagnosis     ICD-10-CM    1. Impingement syndrome of right shoulder  M75.41       2. Chronic right shoulder pain  M25.511     G89.29       3. Bicipital tendinitis of right shoulder  M75.21       4. Superior glenoid labrum lesion of right shoulder, subsequent encounter  S43.431D       5. Other sprain of right shoulder joint, subsequent encounter  S43.491D           Start Time: 1524  Stop Time: 1614  Total time in clinic (min): 50 minutes    Subjective: Patient reports that her arm has been sore since her last visit.      Objective: See treatment diary below.      Assessment: Tolerated treatment well. Patient demonstrated fatigue post treatment and would benefit from continued PT. Patient with gradually improving right shoulder ROM in all planes.      Plan: Continue per plan of care.  Progress treatment as tolerated.   Progress treament per protocol.      POC expires Unit limit Auth  expiration date PT/OT + Visit Limit?   24 N/A N/A 60                 Visit/Unit Tracking  AUTH Status:  Date            N/A Used 8 9  10 11            Remaining  52 51 50 49             Precautions: s/p R shoulder arthroscopic RC-R, open subpectoralis biceps tenodesis, debridement of anterior/superior/posterior labrum tears, SAD (DOS: 5/15/24) ; follow RC-R protocol ; Sling x 6 wks   NO UBE       Manuals  7/8         R shoulder PROM per protocol  SC gentle  GR GR         R elbow PROM             Gr. II-IV GHJ AP, Inf mobs                          Neuro Re-Ed             Scap squeezes Reviewed            Prone row AROM to neutral  10x                                                                             Ther Ex             Patient education: pathophysiology, surgical/rehab overview, post-operative contraindications/precautions,  signs of  "infection, wound care/edema management, HEP review GR  HEP review          Pendulums (CW/CCW circles, H, V) Reviewed Reviewed.            Elbow AROM Reviewed 2x 10            Wrist AROM Reviewed 2x 10            Gripping Reviewed Green 2x 10            Supine shoulder flexion AAROM with opp UE  2x 10 5\"            Supine ER AAROM with SPC  5\"  15x            Scaption in side lying with UE ranger  2x 10  2x10          Scapular protraction/retraction in side lying with UE ranger  Gentle VC's 2x 10   2x10          Submaximal, pain-free IR, ER isometrics  NV 20x5\"          ER AROM in side lying  2x 10  2x10          Table slides: F, scap, ER   10x10\" ea. 10x10\" ea.         Pulleys   20x5\" 20x5\"         Fingerladder   10x5\" 10x5\"         Shoulder extension AAROM with SPC    20x5\"         Ther Activity                                       Gait Training                                       Modalities                                       Access Code: [email or web address]  URL: [email or web address]  Date: 07/01/2024  Prepared by: Nir Shanks    Exercises  - Seated Shoulder External Rotation PROM on Table  - 1 x daily - 7 x weekly - 2 sets - 10 reps - 10 seconds hold  - Seated Shoulder Scaption Slide at Table Top with Forearm in Neutral  - 1 x daily - 7 x weekly - 2 sets - 10 reps - 10 seconds hold  - Seated Shoulder Flexion Towel Slide at Table Top Full Range of Motion  - 1 x daily - 7 x weekly - 2 sets - 10 reps - 10 seconds hold  - Supine Shoulder External Rotation with Dowel  - 1 x daily - 7 x weekly - 2 sets - 10 reps - 10 seconds hold  - Supine Shoulder Flexion AAROM  - 1 x daily - 7 x weekly - 2 sets - 10 reps - 10 seconds hold  - Shoulder Flexion Wall Slide with Towel  - 1 x daily - 7 x weekly - 2 sets - 10 reps - 5 seconds hold  - Standing Isometric Shoulder External Rotation with Doorway  - 1 x daily - 4 x weekly - 2 sets - 10 reps - 5 seconds hold  - Standing Isometric Shoulder Internal Rotation at Doorway  - " 1 x daily - 4 x weekly - 2 sets - 10 reps - 5 seconds hold  - Sidelying Shoulder External Rotation with Dumbbell  - 1 x daily - 4 x weekly - 2 sets - 10 reps

## 2024-07-10 ENCOUNTER — OFFICE VISIT (OUTPATIENT)
Dept: PHYSICAL THERAPY | Facility: CLINIC | Age: 59
End: 2024-07-10
Payer: COMMERCIAL

## 2024-07-10 DIAGNOSIS — S43.491D OTHER SPRAIN OF RIGHT SHOULDER JOINT, SUBSEQUENT ENCOUNTER: ICD-10-CM

## 2024-07-10 DIAGNOSIS — M75.21 BICIPITAL TENDINITIS OF RIGHT SHOULDER: ICD-10-CM

## 2024-07-10 DIAGNOSIS — M75.41 IMPINGEMENT SYNDROME OF RIGHT SHOULDER: Primary | ICD-10-CM

## 2024-07-10 DIAGNOSIS — G89.29 CHRONIC RIGHT SHOULDER PAIN: ICD-10-CM

## 2024-07-10 DIAGNOSIS — S43.431D SUPERIOR GLENOID LABRUM LESION OF RIGHT SHOULDER, SUBSEQUENT ENCOUNTER: ICD-10-CM

## 2024-07-10 DIAGNOSIS — M25.511 CHRONIC RIGHT SHOULDER PAIN: ICD-10-CM

## 2024-07-10 PROCEDURE — 97140 MANUAL THERAPY 1/> REGIONS: CPT | Performed by: PHYSICAL THERAPIST

## 2024-07-10 PROCEDURE — 97110 THERAPEUTIC EXERCISES: CPT | Performed by: PHYSICAL THERAPIST

## 2024-07-10 NOTE — PROGRESS NOTES
Daily Note     Today's date: 7/10/2024  Patient name: Leah Lora  : 1965  MRN: [id number]  Referring provider: Brian Kwok DO  Dx:   Encounter Diagnosis     ICD-10-CM    1. Impingement syndrome of right shoulder  M75.41       2. Chronic right shoulder pain  M25.511     G89.29       3. Bicipital tendinitis of right shoulder  M75.21       4. Superior glenoid labrum lesion of right shoulder, subsequent encounter  S43.431D       5. Other sprain of right shoulder joint, subsequent encounter  S43.491D           Start Time: 1524  Stop Time: 1610  Total time in clinic (min): 46 minutes    Subjective: Patient reports that her biceps and shoulder remains sore.      Objective: See treatment diary below      Assessment: Tolerated treatment well. Patient demonstrated fatigue post treatment and would benefit from continued PT. Patient with gradually improving right shoulder PROM in all planes. Patient with c/o right biceps spasm with prone row to neutral, however, this did loosen up with repetition.      Plan: Continue per plan of care.  Progress treatment as tolerated.   Progress treament per protocol.      POC expires Unit limit Auth  expiration date PT/OT + Visit Limit?   24 N/A N/A 60                 Visit/Unit Tracking  AUTH Status:  Date 6/25 6/27 7/1 7/8 7/10          N/A Used 8 9  10 11 12           Remaining  52 51 50 49 48            Precautions: s/p R shoulder arthroscopic RC-R, open subpectoralis biceps tenodesis, debridement of anterior/superior/posterior labrum tears, SAD (DOS: 5/15/24) ; follow RC-R protocol ; Sling x 6 wks   NO UBE       Manuals 6/25 6/27 7/1 7/8 7/10        R shoulder PROM per protocol  SC gentle  GR GR GR        R elbow PROM             Gr. II-IV GHJ AP, Inf mobs                          Neuro Re-Ed             Scap squeezes Reviewed            Prone row AROM to neutral  10x                                                                             Ther Ex             Patient  "education: pathophysiology, surgical/rehab overview, post-operative contraindications/precautions,  signs of infection, wound care/edema management, HEP review GR  HEP review          Pendulums (CW/CCW circles, H, V) Reviewed Reviewed.            Elbow AROM Reviewed 2x 10            Wrist AROM Reviewed 2x 10            Gripping Reviewed Green 2x 10            Supine shoulder flexion AAROM with opp UE  2x 10 5\"            Supine ER AAROM with SPC  5\"  15x            Scaption in side lying with UE ranger  2x 10  2x10          Scapular protraction/retraction in side lying with UE ranger  Gentle VC's 2x 10   2x10          Submaximal, pain-free IR, ER isometrics  NV 20x5\"  20x5\"        ER AROM in side lying  2x 10  2x10 10x5\"         Table slides: F, scap, ER   10x10\" ea. 10x10\" ea.         Pulleys   20x5\" 20x5\" 20x5\"        Fingerladder   10x5\" 10x5\" 10x5\"        Shoulder extension AAROM with SPC    20x5\" 20x5\"        Shoulder flexion, abduction AAROM with SPC     10x5\"        Ther Activity                                       Gait Training                                       Modalities                                       Access Code: [email or web address]  URL: [email or web address]  Date: 07/01/2024  Prepared by: Nir Shanks    Exercises  - Seated Shoulder External Rotation PROM on Table  - 1 x daily - 7 x weekly - 2 sets - 10 reps - 10 seconds hold  - Seated Shoulder Scaption Slide at Table Top with Forearm in Neutral  - 1 x daily - 7 x weekly - 2 sets - 10 reps - 10 seconds hold  - Seated Shoulder Flexion Towel Slide at Table Top Full Range of Motion  - 1 x daily - 7 x weekly - 2 sets - 10 reps - 10 seconds hold  - Supine Shoulder External Rotation with Dowel  - 1 x daily - 7 x weekly - 2 sets - 10 reps - 10 seconds hold  - Supine Shoulder Flexion AAROM  - 1 x daily - 7 x weekly - 2 sets - 10 reps - 10 seconds hold  - Shoulder Flexion Wall Slide with Towel  - 1 x daily - 7 x weekly - 2 sets - 10 reps - 5 " seconds hold  - Standing Isometric Shoulder External Rotation with Doorway  - 1 x daily - 4 x weekly - 2 sets - 10 reps - 5 seconds hold  - Standing Isometric Shoulder Internal Rotation at Doorway  - 1 x daily - 4 x weekly - 2 sets - 10 reps - 5 seconds hold  - Sidelying Shoulder External Rotation with Dumbbell  - 1 x daily - 4 x weekly - 2 sets - 10 reps

## 2024-07-15 ENCOUNTER — APPOINTMENT (OUTPATIENT)
Dept: PHYSICAL THERAPY | Facility: CLINIC | Age: 59
End: 2024-07-15
Payer: COMMERCIAL

## 2024-07-17 ENCOUNTER — APPOINTMENT (OUTPATIENT)
Dept: PHYSICAL THERAPY | Facility: CLINIC | Age: 59
End: 2024-07-17
Payer: COMMERCIAL

## 2024-07-22 ENCOUNTER — OFFICE VISIT (OUTPATIENT)
Dept: PHYSICAL THERAPY | Facility: CLINIC | Age: 59
End: 2024-07-22
Payer: COMMERCIAL

## 2024-07-22 DIAGNOSIS — G89.29 CHRONIC RIGHT SHOULDER PAIN: ICD-10-CM

## 2024-07-22 DIAGNOSIS — M75.21 BICIPITAL TENDINITIS OF RIGHT SHOULDER: ICD-10-CM

## 2024-07-22 DIAGNOSIS — S43.491D OTHER SPRAIN OF RIGHT SHOULDER JOINT, SUBSEQUENT ENCOUNTER: ICD-10-CM

## 2024-07-22 DIAGNOSIS — S43.431D SUPERIOR GLENOID LABRUM LESION OF RIGHT SHOULDER, SUBSEQUENT ENCOUNTER: ICD-10-CM

## 2024-07-22 DIAGNOSIS — M25.511 CHRONIC RIGHT SHOULDER PAIN: ICD-10-CM

## 2024-07-22 DIAGNOSIS — M75.41 IMPINGEMENT SYNDROME OF RIGHT SHOULDER: Primary | ICD-10-CM

## 2024-07-22 PROCEDURE — 97140 MANUAL THERAPY 1/> REGIONS: CPT

## 2024-07-22 PROCEDURE — 97110 THERAPEUTIC EXERCISES: CPT

## 2024-07-22 NOTE — PROGRESS NOTES
Daily Note    Today's date: 24  Patient name: Leah Lora  : 1965  MRN: [id number]  Referring provider: Brian Kwok DO  Dx:   Encounter Diagnosis     ICD-10-CM    1. Impingement syndrome of right shoulder  M75.41       2. Chronic right shoulder pain  M25.511     G89.29       3. Bicipital tendinitis of right shoulder  M75.21       4. Superior glenoid labrum lesion of right shoulder, subsequent encounter  S43.431D       5. Other sprain of right shoulder joint, subsequent encounter  S43.491D           Start Time: 1530  Stop Time: 1615  Total time in clinic (min): 45 minutes      Subjective: Leah reports she is feeling much better following previous session the removal of her sling. She notes adherence to HEP.    Objective: See treatment diary below. PROM to 80 ER at 90 ab. 130 flexion and 120 abduction with empty end feel.    Assessment: Leah tolerated treatment well with consistent cuing throughout. TE's were performed with increased reps. New TE's were demonstrated with proper technique, and tolerated well. Following treatment, the patient demonstrated fatigue and would benefit from continued physical therapy.    Plan: Continue per plan of care.  Progress treatment as tolerated.         POC expires Unit limit Auth  expiration date PT/OT + Visit Limit?   24 N/A N/A 60                 Visit/Unit Tracking  AUTH Status:  Date 6/25 6/27 7/1 7/8 7/10 7/22         N/A Used 8 9  10 11 12 13          Remaining  52 51 50 49 48 47           Precautions: s/p R shoulder arthroscopic RC-R, open subpectoralis biceps tenodesis, debridement of anterior/superior/posterior labrum tears, SAD (DOS: 5/15/24) ; follow RC-R protocol ; Sling x 6 wks   NO UBE       Manuals 6/25 6/27 7/1 7/8 7/10 7/22       R shoulder PROM per protocol  SC gentle  GR GR GR TS see obj.       R elbow PROM             Gr. II-IV GHJ AP, Inf mobs      nv                    Neuro Re-Ed             Scap squeezes Reviewed           "  Prone row AROM to neutral  10x                                                                             Ther Ex             Patient education: pathophysiology, surgical/rehab overview, post-operative contraindications/precautions,  signs of infection, wound care/edema management, HEP review GR  HEP review          Pendulums (CW/CCW circles, H, V) Reviewed Reviewed.            Elbow AROM Reviewed 2x 10            Wrist AROM Reviewed 2x 10            Gripping Reviewed Green 2x 10            Supine shoulder flexion AAROM with opp UE  2x 10 5\"            Supine ER AAROM with SPC  5\"  15x            Scaption in side lying with UE ranger  2x 10  2x10          Scapular protraction/retraction in side lying with UE ranger  Gentle VC's 2x 10   2x10          Submaximal, pain-free IR, ER isometrics  NV 20x5\"  20x5\" 20x5\"        ER AROM in side lying  2x 10  2x10 10x5\"         Table slides: F, scap, ER   10x10\" ea. 10x10\" ea.  10x10\"        Pulleys   20x5\" 20x5\" 20x5\" 20x5\"        Fingerladder   10x5\" 10x5\" 10x5\" 10x5\"        Shoulder extension AAROM with SPC    20x5\" 20x5\"        Shoulder flexion, abduction AAROM with SPC     10x5\"        Ther Activity                                       Gait Training                                       Modalities                                       Access Code: [email or web address]  URL: [email or web address]  Date: 07/01/2024  Prepared by: Nir Shanks    Exercises  - Seated Shoulder External Rotation PROM on Table  - 1 x daily - 7 x weekly - 2 sets - 10 reps - 10 seconds hold  - Seated Shoulder Scaption Slide at Table Top with Forearm in Neutral  - 1 x daily - 7 x weekly - 2 sets - 10 reps - 10 seconds hold  - Seated Shoulder Flexion Towel Slide at Table Top Full Range of Motion  - 1 x daily - 7 x weekly - 2 sets - 10 reps - 10 seconds hold  - Supine Shoulder External Rotation with Dowel  - 1 x daily - 7 x weekly - 2 sets - 10 reps - 10 seconds hold  - Supine Shoulder Flexion " AAROM  - 1 x daily - 7 x weekly - 2 sets - 10 reps - 10 seconds hold  - Shoulder Flexion Wall Slide with Towel  - 1 x daily - 7 x weekly - 2 sets - 10 reps - 5 seconds hold  - Standing Isometric Shoulder External Rotation with Doorway  - 1 x daily - 4 x weekly - 2 sets - 10 reps - 5 seconds hold  - Standing Isometric Shoulder Internal Rotation at Doorway  - 1 x daily - 4 x weekly - 2 sets - 10 reps - 5 seconds hold  - Sidelying Shoulder External Rotation with Dumbbell  - 1 x daily - 4 x weekly - 2 sets - 10 reps             Rad Goel, PT  7/22/2024,4:06 PM

## 2024-07-24 ENCOUNTER — EVALUATION (OUTPATIENT)
Dept: PHYSICAL THERAPY | Facility: CLINIC | Age: 59
End: 2024-07-24
Payer: COMMERCIAL

## 2024-07-24 DIAGNOSIS — M25.511 CHRONIC RIGHT SHOULDER PAIN: ICD-10-CM

## 2024-07-24 DIAGNOSIS — G89.29 CHRONIC RIGHT SHOULDER PAIN: ICD-10-CM

## 2024-07-24 DIAGNOSIS — M75.21 BICIPITAL TENDINITIS OF RIGHT SHOULDER: ICD-10-CM

## 2024-07-24 DIAGNOSIS — S43.431D SUPERIOR GLENOID LABRUM LESION OF RIGHT SHOULDER, SUBSEQUENT ENCOUNTER: ICD-10-CM

## 2024-07-24 DIAGNOSIS — S43.491D OTHER SPRAIN OF RIGHT SHOULDER JOINT, SUBSEQUENT ENCOUNTER: ICD-10-CM

## 2024-07-24 DIAGNOSIS — M75.41 IMPINGEMENT SYNDROME OF RIGHT SHOULDER: Primary | ICD-10-CM

## 2024-07-24 PROCEDURE — 97110 THERAPEUTIC EXERCISES: CPT | Performed by: PHYSICAL THERAPIST

## 2024-07-24 PROCEDURE — 97140 MANUAL THERAPY 1/> REGIONS: CPT | Performed by: PHYSICAL THERAPIST

## 2024-07-24 NOTE — LETTER
2024    Brian Kwok DO  [address] [address]    Patient: Leah Lora   YOB: 1965   Date of Visit: 2024     Encounter Diagnosis     ICD-10-CM    1. Impingement syndrome of right shoulder  M75.41       2. Chronic right shoulder pain  M25.511     G89.29       3. Bicipital tendinitis of right shoulder  M75.21       4. Superior glenoid labrum lesion of right shoulder, subsequent encounter  S43.431D       5. Other sprain of right shoulder joint, subsequent encounter  S43.491D           Dear Dr. Kwok:    Thank you for your recent referral of Leah Lora. Please review the attached evaluation summary from Leah's recent visit.     Please verify that you agree with the plan of care by signing the attached order.     If you have any questions or concerns, please do not hesitate to call.     I sincerely appreciate the opportunity to share in the care of one of your patients and hope to have another opportunity to work with you in the near future.       Sincerely,    Nir Shanks, PT      Referring Provider:      I certify that I have read the below Plan of Care and certify the need for these services furnished under this plan of treatment while under my care.                    Brian Kwok DO  [address] [place] [id number]  Via Fax: [phone]          PT Re-Evaluation     Today's date: 2024  Patient name: Leah Lora  : 1965  MRN: [id number]  Referring provider: Brian Kwok DO  Dx:   Encounter Diagnosis     ICD-10-CM    1. Impingement syndrome of right shoulder  M75.41       2. Chronic right shoulder pain  M25.511     G89.29       3. Bicipital tendinitis of right shoulder  M75.21       4. Superior glenoid labrum lesion of right shoulder, subsequent encounter  S43.431D       5. Other sprain of right shoulder joint, subsequent encounter  S43.491D           Start Time: 1535  Stop Time: 1625  Total time in clinic (min): 50  minutes    Assessment  Impairments: abnormal or restricted ROM, activity intolerance, impaired physical strength, lacks appropriate home exercise program, pain with function and safety issue    Assessment details: Leah Lora is a 59 y.o. female who presents with c/o chronic right shoulder pain with limited ROM. Patient failed conservative treatment and opted to undergo elective surgery. Patient is approximately 10 wks s/p R shoulder arthroscopic RC-R, open subpectoralis biceps tenodesis, debridement of anterior/superior/posterior labrum tears, SAD (DOS: 5/15/24). Patient has attended a total of 7 appointments to date and has maintained good compliance with POC. Overall, Patient is making gradual progress per POC and protocol guidelines, including decreased pain and improved ROM. UE strengthening deferred per protocol guidelines, however, will be initiated as Patient is now 10 weeks removed from surgery. Patient's quality of sleep is improving. Patient would benefit from skilled physical therapy to address their aforementioned impairments, improve their level of function and to improve their overall quality of life.  Understanding of Dx/Px/POC: excellent     Prognosis: excellent    Goals  Short Term Goals: to be achieved by 4 weeks  1) Patient to be independent with basic HEP. MET  2) Decrease pain to 4/10 at its worst. NOT MET  3) Increase shoulder flexion PROM to 120 deg. MET  4) Increase shoulder abduction PROM to 120 deg. MET  5) Increase shoulder ER PROM at 30 deg abduction to 60 deg. MET  6) Increase shoulder IR PROM at 30 deg abduction to 60 deg. NOT MET  7) Patient to report decreased sleep interruption secondary to pain. MET    Long Term Goals: to be achieved by discharge ALL GOALS PROGRESSING  1) FOTO equal to or greater than 67.  2) Patient to be independent with comprehensive HEP.  3) Abolish pain for improved quality of life.  4) Increase shoulder ROM to within 5 deg of contralateral UE to improve  a/iadls.   5) Increase shoulder strength to 5/5 MMT grade in all planes to improve a/iadls.   6) Patient to report no sleep interruption secondary to pain.  7) Patient to return to full duty at work.     Plan  Patient would benefit from: skilled PT  Planned modality interventions: cryotherapy, hydrotherapy, TENS, thermotherapy: hydrocollator packs and low level laser therapy    Planned therapy interventions: activity modification, ADL retraining, ADL training, behavior modification, body mechanics training, dressing changes, functional ROM exercises, home exercise program, IADL retraining, joint mobilization, manual therapy, massage, neuromuscular re-education, patient education, postural training, self care, strengthening, stretching, therapeutic activities and therapeutic exercise    Frequency: 1-2x week.  Duration in weeks: 8  Plan of Care beginning date: 2024  Plan of Care expiration date: 2024  Treatment plan discussed with: patient        Subjective Evaluation    History of Present Illness  Mechanism of injury: Patient reports that since beginning physical therapy she is able to reach overhead with greater ease. Patient's pain has been less since her operation, but continues to disrupt her sleep. Patient's shoulder feels weak. Patient estimates her right shoulder overall level of function to be approximately 60% of her premorbid norm. Patient has intermittent numbness in her right hand when sleeping. Patient's next f/u appointment with her surgeon is scheduled for .  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     Left Elbow   Flexion: WFL  Extension: WFL    Right Elbow   Flexion: WFL  Extension: WFL    Left Wrist   Normal active range of motion    Right Wrist   Normal active range of motion    Passive Range of Motion   Left Shoulder   Flexion: 175 degrees   Abduction: 175 degrees   External rotation 90°: 95 degrees   Internal rotation 90°: 65 degrees     Right Shoulder   Flexion: 157 degrees with pain  Abduction: 160 degrees with pain  External rotation 45°: 73 degrees with pain  Internal rotation 90°: 52 degrees     Strength/Myotome Testing     Additional Strength Details  Deferred UE strength testing secondary to recency of surgery      Flowsheet Rows      Flowsheet Row Most Recent Value   PT/OT G-Codes    Current Score 34   Projected Score 67                   POC expires Unit limit Auth  expiration date PT/OT + Visit Limit?   9/18/24 N/A N/A 60                 Visit/Unit Tracking  AUTH Status:  Date 6/25 6/27 7/1 7/8 7/10 7/22 7/24        N/A Used 8 9  10 11 12 13 14         Remaining  52 51 50 49 48 47 46          Precautions: s/p R shoulder arthroscopic RC-R, open subpectoralis biceps tenodesis, debridement of anterior/superior/posterior labrum tears, SAD (DOS: 5/15/24) ; follow RC-R protocol ; Sling x 6 wks   NO UBE       Manuals 6/25 6/27 7/1 7/8 7/10 7/22 7/24      R shoulder PROM per protocol  SC gentle  GR GR GR TS see obj. GR      R elbow PROM             Gr. II-IV GHJ AP, Inf mobs      nv       Reassessment       GR      Neuro Re-Ed             Scap squeezes Reviewed            Prone row AROM to neutral  10x       2#     Webslide: M, L row        NV            "                                              Ther Ex             Patient education: pathophysiology, surgical/rehab overview, post-operative contraindications/precautions,  signs of infection, wound care/edema management, HEP review GR  HEP review          Pendulums (CW/CCW circles, H, V) Reviewed Reviewed.            Elbow AROM Reviewed 2x 10            Wrist AROM Reviewed 2x 10            Gripping Reviewed Green 2x 10            Supine shoulder flexion AAROM with opp UE  2x 10 5\"            Supine ER AAROM with SPC  5\"  15x            Scaption in side lying with UE ranger  2x 10  2x10          Scapular protraction/retraction in side lying with UE ranger  Gentle VC's 2x 10   2x10          Submaximal, pain-free IR, ER isometrics  NV 20x5\"  20x5\" 20x5\"        ER AROM in side lying  2x 10  2x10 10x5\"    2#     Table slides: F, scap, ER   10x10\" ea. 10x10\" ea.  10x10\"        Pulleys   20x5\" 20x5\" 20x5\" 20x5\"  20x5\"      Fingerladder   10x5\" 10x5\" 10x5\" 10x5\"        Shoulder extension AAROM with SPC    20x5\" 20x5\"  IR AAROM Reviewed      Shoulder flexion, abduction AAROM with SPC     10x5\"        Sleeper str.       10x10\"      TB ER        NV     TB IR        NV                                            Ther Activity                                       Gait Training                                       Modalities                                       Access Code: [email or web address]  URL: [email or web address]  Date: 07/01/2024  Prepared by: Nir Shanks    Exercises  - Seated Shoulder External Rotation PROM on Table  - 1 x daily - 7 x weekly - 2 sets - 10 reps - 10 seconds hold  - Seated Shoulder Scaption Slide at Table Top with Forearm in Neutral  - 1 x daily - 7 x weekly - 2 sets - 10 reps - 10 seconds hold  - Seated Shoulder Flexion Towel Slide at Table Top Full Range of Motion  - 1 x daily - 7 x weekly - 2 sets - 10 reps - 10 seconds hold  - Supine Shoulder External Rotation with Dowel  - 1 x daily - 7 x " weekly - 2 sets - 10 reps - 10 seconds hold  - Supine Shoulder Flexion AAROM  - 1 x daily - 7 x weekly - 2 sets - 10 reps - 10 seconds hold  - Shoulder Flexion Wall Slide with Towel  - 1 x daily - 7 x weekly - 2 sets - 10 reps - 5 seconds hold  - Standing Isometric Shoulder External Rotation with Doorway  - 1 x daily - 4 x weekly - 2 sets - 10 reps - 5 seconds hold  - Standing Isometric Shoulder Internal Rotation at Doorway  - 1 x daily - 4 x weekly - 2 sets - 10 reps - 5 seconds hold  - Sidelying Shoulder External Rotation with Dumbbell  - 1 x daily - 4 x weekly - 2 sets - 10 reps

## 2024-07-24 NOTE — PROGRESS NOTES
PT Re-Evaluation     Today's date: 2024  Patient name: Leah Lora  : 1965  MRN: [id number]  Referring provider: Brian Kwok DO  Dx:   Encounter Diagnosis     ICD-10-CM    1. Impingement syndrome of right shoulder  M75.41       2. Chronic right shoulder pain  M25.511     G89.29       3. Bicipital tendinitis of right shoulder  M75.21       4. Superior glenoid labrum lesion of right shoulder, subsequent encounter  S43.431D       5. Other sprain of right shoulder joint, subsequent encounter  S43.491D           Start Time: 1535  Stop Time: 1625  Total time in clinic (min): 50 minutes    Assessment  Impairments: abnormal or restricted ROM, activity intolerance, impaired physical strength, lacks appropriate home exercise program, pain with function and safety issue    Assessment details: Leah Lora is a 59 y.o. female who presents with c/o chronic right shoulder pain with limited ROM. Patient failed conservative treatment and opted to undergo elective surgery. Patient is approximately 10 wks s/p R shoulder arthroscopic RC-R, open subpectoralis biceps tenodesis, debridement of anterior/superior/posterior labrum tears, SAD (DOS: 5/15/24). Patient has attended a total of 7 appointments to date and has maintained good compliance with POC. Overall, Patient is making gradual progress per POC and protocol guidelines, including decreased pain and improved ROM. UE strengthening deferred per protocol guidelines, however, will be initiated as Patient is now 10 weeks removed from surgery. Patient's quality of sleep is improving. Patient would benefit from skilled physical therapy to address their aforementioned impairments, improve their level of function and to improve their overall quality of life.  Understanding of Dx/Px/POC: excellent     Prognosis: excellent    Goals  Short Term Goals: to be achieved by 4 weeks  1) Patient to be independent with basic HEP. MET  2) Decrease pain to 4/10 at its worst. NOT  MET  3) Increase shoulder flexion PROM to 120 deg. MET  4) Increase shoulder abduction PROM to 120 deg. MET  5) Increase shoulder ER PROM at 30 deg abduction to 60 deg. MET  6) Increase shoulder IR PROM at 30 deg abduction to 60 deg. NOT MET  7) Patient to report decreased sleep interruption secondary to pain. MET    Long Term Goals: to be achieved by discharge ALL GOALS PROGRESSING  1) FOTO equal to or greater than 67.  2) Patient to be independent with comprehensive HEP.  3) Abolish pain for improved quality of life.  4) Increase shoulder ROM to within 5 deg of contralateral UE to improve a/iadls.   5) Increase shoulder strength to 5/5 MMT grade in all planes to improve a/iadls.   6) Patient to report no sleep interruption secondary to pain.  7) Patient to return to full duty at work.     Plan  Patient would benefit from: skilled PT  Planned modality interventions: cryotherapy, hydrotherapy, TENS, thermotherapy: hydrocollator packs and low level laser therapy    Planned therapy interventions: activity modification, ADL retraining, ADL training, behavior modification, body mechanics training, dressing changes, functional ROM exercises, home exercise program, IADL retraining, joint mobilization, manual therapy, massage, neuromuscular re-education, patient education, postural training, self care, strengthening, stretching, therapeutic activities and therapeutic exercise    Frequency: 1-2x week.  Duration in weeks: 8  Plan of Care beginning date: 7/24/2024  Plan of Care expiration date: 9/18/2024  Treatment plan discussed with: patient        Subjective Evaluation    History of Present Illness  Mechanism of injury: Patient reports that since beginning physical therapy she is able to reach overhead with greater ease. Patient's pain has been less since her operation, but continues to disrupt her sleep. Patient's shoulder feels weak. Patient estimates her right shoulder overall level of function to be approximately 60%  of her premorbid norm. Patient has intermittent numbness in her right hand when sleeping. Patient's next f/u appointment with her surgeon is scheduled for .  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     Left Elbow   Flexion: WFL  Extension: WFL    Right Elbow   Flexion: WFL  Extension: WFL    Left Wrist   Normal active range of motion    Right Wrist   Normal active range of motion    Passive Range of Motion   Left Shoulder   Flexion: 175 degrees   Abduction: 175 degrees   External rotation 90°: 95 degrees   Internal rotation 90°: 65 degrees     Right Shoulder   Flexion: 157 degrees with pain  Abduction: 160 degrees with pain  External rotation 45°: 73 degrees with pain  Internal rotation 90°: 52 degrees     Strength/Myotome Testing     Additional Strength Details  Deferred UE strength testing secondary to recency of surgery      Flowsheet Rows      Flowsheet Row Most Recent Value   PT/OT G-Codes    Current Score 34   Projected Score 67                   POC expires Unit limit Auth  expiration date PT/OT + Visit Limit?   24 N/A N/A 60                 Visit/Unit Tracking  AUTH Status:  Date  7 7/ 7/10 7/22 7/24        N/A Used 8 9  10 11 12 13 14         Remaining  52 51 50  "49 [phone] 46          Precautions: s/p R shoulder arthroscopic RC-R, open subpectoralis biceps tenodesis, debridement of anterior/superior/posterior labrum tears, SAD (DOS: 5/15/24) ; follow RC-R protocol ; Sling x 6 wks   NO UBE       Manuals 6/25 6/27 7/1 7/8 7/10 7/22 7/24      R shoulder PROM per protocol  SC gentle  GR GR GR TS see obj. GR      R elbow PROM             Gr. II-IV GHJ AP, Inf mobs      nv       Reassessment       GR      Neuro Re-Ed             Scap squeezes Reviewed            Prone row AROM to neutral  10x       2#     Webslide: M, L row        NV                                                         Ther Ex             Patient education: pathophysiology, surgical/rehab overview, post-operative contraindications/precautions,  signs of infection, wound care/edema management, HEP review GR  HEP review          Pendulums (CW/CCW circles, H, V) Reviewed Reviewed.            Elbow AROM Reviewed 2x 10            Wrist AROM Reviewed 2x 10            Gripping Reviewed Green 2x 10            Supine shoulder flexion AAROM with opp UE  2x 10 5\"            Supine ER AAROM with SPC  5\"  15x            Scaption in side lying with UE ranger  2x 10  2x10          Scapular protraction/retraction in side lying with UE ranger  Gentle VC's 2x 10   2x10          Submaximal, pain-free IR, ER isometrics  NV 20x5\"  20x5\" 20x5\"        ER AROM in side lying  2x 10  2x10 10x5\"    2#     Table slides: F, scap, ER   10x10\" ea. 10x10\" ea.  10x10\"        Pulleys   20x5\" 20x5\" 20x5\" 20x5\"  20x5\"      Fingerladder   10x5\" 10x5\" 10x5\" 10x5\"        Shoulder extension AAROM with SPC    20x5\" 20x5\"  IR AAROM Reviewed      Shoulder flexion, abduction AAROM with SPC     10x5\"        Sleeper str.       10x10\"      TB ER        NV     TB IR        NV                                            Ther Activity                                       Gait Training                                       Modalities                              "          Access Code: [email or web address]  URL: [email or web address]  Date: 07/01/2024  Prepared by: Nir Shanks    Exercises  - Seated Shoulder External Rotation PROM on Table  - 1 x daily - 7 x weekly - 2 sets - 10 reps - 10 seconds hold  - Seated Shoulder Scaption Slide at Table Top with Forearm in Neutral  - 1 x daily - 7 x weekly - 2 sets - 10 reps - 10 seconds hold  - Seated Shoulder Flexion Towel Slide at Table Top Full Range of Motion  - 1 x daily - 7 x weekly - 2 sets - 10 reps - 10 seconds hold  - Supine Shoulder External Rotation with Dowel  - 1 x daily - 7 x weekly - 2 sets - 10 reps - 10 seconds hold  - Supine Shoulder Flexion AAROM  - 1 x daily - 7 x weekly - 2 sets - 10 reps - 10 seconds hold  - Shoulder Flexion Wall Slide with Towel  - 1 x daily - 7 x weekly - 2 sets - 10 reps - 5 seconds hold  - Standing Isometric Shoulder External Rotation with Doorway  - 1 x daily - 4 x weekly - 2 sets - 10 reps - 5 seconds hold  - Standing Isometric Shoulder Internal Rotation at Doorway  - 1 x daily - 4 x weekly - 2 sets - 10 reps - 5 seconds hold  - Sidelying Shoulder External Rotation with Dumbbell  - 1 x daily - 4 x weekly - 2 sets - 10 reps

## 2024-07-29 ENCOUNTER — OFFICE VISIT (OUTPATIENT)
Dept: PHYSICAL THERAPY | Facility: CLINIC | Age: 59
End: 2024-07-29
Payer: COMMERCIAL

## 2024-07-29 DIAGNOSIS — M75.21 BICIPITAL TENDINITIS OF RIGHT SHOULDER: ICD-10-CM

## 2024-07-29 DIAGNOSIS — S43.431D SUPERIOR GLENOID LABRUM LESION OF RIGHT SHOULDER, SUBSEQUENT ENCOUNTER: ICD-10-CM

## 2024-07-29 DIAGNOSIS — M25.511 CHRONIC RIGHT SHOULDER PAIN: ICD-10-CM

## 2024-07-29 DIAGNOSIS — M75.41 IMPINGEMENT SYNDROME OF RIGHT SHOULDER: Primary | ICD-10-CM

## 2024-07-29 DIAGNOSIS — G89.29 CHRONIC RIGHT SHOULDER PAIN: ICD-10-CM

## 2024-07-29 DIAGNOSIS — S43.491D OTHER SPRAIN OF RIGHT SHOULDER JOINT, SUBSEQUENT ENCOUNTER: ICD-10-CM

## 2024-07-29 PROCEDURE — 97112 NEUROMUSCULAR REEDUCATION: CPT

## 2024-07-29 PROCEDURE — 97110 THERAPEUTIC EXERCISES: CPT

## 2024-07-29 PROCEDURE — 97140 MANUAL THERAPY 1/> REGIONS: CPT

## 2024-07-29 NOTE — PROGRESS NOTES
Daily Note     Today's date: 2024  Patient name: Leah Lora  : 1965  MRN: [id number]  Referring provider: Brian Kwok DO  Dx:   Encounter Diagnosis     ICD-10-CM    1. Impingement syndrome of right shoulder  M75.41       2. Chronic right shoulder pain  M25.511     G89.29       3. Bicipital tendinitis of right shoulder  M75.21       4. Superior glenoid labrum lesion of right shoulder, subsequent encounter  S43.431D       5. Other sprain of right shoulder joint, subsequent encounter  S43.491D           Start Time: 1536  Stop Time: 1619  Total time in clinic (min): 43 minutes    Subjective: Pt notes that she has been looking forward to strengthening.       Objective: See treatment diary below      Assessment: Tolerated treatment well. Initiated strengthening exercises this visit. Pt was most challenged by R SH/ER. Pt was educated on HEP including the use of YTB. Pt was advised to use ice for DOMS. Patient demonstrated fatigue post treatment, exhibited good technique with therapeutic exercises, and would benefit from continued PT      Plan: Continue per plan of care.  Progress treament per protocol.      POC expires Unit limit Auth  expiration date PT/OT + Visit Limit?   24 N/A N/A 60                 Visit/Unit Tracking  AUTH Status:  Date 6/25 6/27 7/1 7/8 7/10 7/22 7/24 7/29       N/A Used 8 9  10 11 12 13 14 15        Remaining  52 51 50 49 48 47 46 45         Precautions: s/p R shoulder arthroscopic RC-R, open subpectoralis biceps tenodesis, debridement of anterior/superior/posterior labrum tears, SAD (DOS: 5/15/24) ; follow RC-R protocol ; Sling x 6 wks   NO UBE       Manuals 6/25 6/27 7/1 7/8 7/10 7/22 7/24 7/29     R shoulder PROM per protocol  SC gentle  GR GR GR TS see obj. GR SA     R elbow PROM             Gr. II-IV GHJ AP, Inf mobs      nv       Reassessment       GR      Neuro Re-Ed             Scap squeezes Reviewed            Prone row AROM to neutral  10x       2# 2x10    "  Webslide: M, L row        YTB 2x10                                                         Ther Ex             Patient education: pathophysiology, surgical/rehab overview, post-operative contraindications/precautions,  signs of infection, wound care/edema management, HEP review GR  HEP review          Pendulums (CW/CCW circles, H, V) Reviewed Reviewed.            Elbow AROM Reviewed 2x 10            Wrist AROM Reviewed 2x 10            Gripping Reviewed Green 2x 10            Supine shoulder flexion AAROM with opp UE  2x 10 5\"            Supine ER AAROM with SPC  5\"  15x            Scaption in side lying with UE ranger  2x 10  2x10          Scapular protraction/retraction in side lying with UE ranger  Gentle VC's 2x 10   2x10          Submaximal, pain-free IR, ER isometrics  NV 20x5\"  20x5\" 20x5\"        ER AROM in side lying  2x 10  2x10 10x5\"    2# 2x10     Table slides: F, scap, ER   10x10\" ea. 10x10\" ea.  10x10\"        Pulleys   20x5\" 20x5\" 20x5\" 20x5\"  20x5\" 20x5\"     Fingerladder   10x5\" 10x5\" 10x5\" 10x5\"        Shoulder extension AAROM with SPC    20x5\" 20x5\"  IR AAROM Reviewed      Shoulder flexion, abduction AAROM with SPC     10x5\"        Sleeper str.       10x10\" 10x10\"     TB ER        YTB 2x10     TB IR        YTB 2x10                                            Ther Activity                                       Gait Training                                       Modalities                                       Access Code: [email or web address]  URL: [email or web address]  Date: 07/01/2024  Prepared by: Nir Shanks    Exercises  - Seated Shoulder External Rotation PROM on Table  - 1 x daily - 7 x weekly - 2 sets - 10 reps - 10 seconds hold  - Seated Shoulder Scaption Slide at Table Top with Forearm in Neutral  - 1 x daily - 7 x weekly - 2 sets - 10 reps - 10 seconds hold  - Seated Shoulder Flexion Towel Slide at Table Top Full Range of Motion  - 1 x daily - 7 x weekly - 2 sets - 10 reps - 10 seconds " hold  - Supine Shoulder External Rotation with Dowel  - 1 x daily - 7 x weekly - 2 sets - 10 reps - 10 seconds hold  - Supine Shoulder Flexion AAROM  - 1 x daily - 7 x weekly - 2 sets - 10 reps - 10 seconds hold  - Shoulder Flexion Wall Slide with Towel  - 1 x daily - 7 x weekly - 2 sets - 10 reps - 5 seconds hold  - Standing Isometric Shoulder External Rotation with Doorway  - 1 x daily - 4 x weekly - 2 sets - 10 reps - 5 seconds hold  - Standing Isometric Shoulder Internal Rotation at Doorway  - 1 x daily - 4 x weekly - 2 sets - 10 reps - 5 seconds hold  - Sidelying Shoulder External Rotation with Dumbbell  - 1 x daily - 4 x weekly - 2 sets - 10 reps

## 2024-07-31 ENCOUNTER — OFFICE VISIT (OUTPATIENT)
Dept: PHYSICAL THERAPY | Facility: CLINIC | Age: 59
End: 2024-07-31
Payer: COMMERCIAL

## 2024-07-31 DIAGNOSIS — S43.491D OTHER SPRAIN OF RIGHT SHOULDER JOINT, SUBSEQUENT ENCOUNTER: ICD-10-CM

## 2024-07-31 DIAGNOSIS — G89.29 CHRONIC RIGHT SHOULDER PAIN: ICD-10-CM

## 2024-07-31 DIAGNOSIS — M25.511 CHRONIC RIGHT SHOULDER PAIN: ICD-10-CM

## 2024-07-31 DIAGNOSIS — S43.431D SUPERIOR GLENOID LABRUM LESION OF RIGHT SHOULDER, SUBSEQUENT ENCOUNTER: ICD-10-CM

## 2024-07-31 DIAGNOSIS — M75.41 IMPINGEMENT SYNDROME OF RIGHT SHOULDER: Primary | ICD-10-CM

## 2024-07-31 DIAGNOSIS — M75.21 BICIPITAL TENDINITIS OF RIGHT SHOULDER: ICD-10-CM

## 2024-07-31 PROCEDURE — 97112 NEUROMUSCULAR REEDUCATION: CPT | Performed by: PHYSICAL THERAPIST

## 2024-07-31 PROCEDURE — 97110 THERAPEUTIC EXERCISES: CPT | Performed by: PHYSICAL THERAPIST

## 2024-07-31 PROCEDURE — 97140 MANUAL THERAPY 1/> REGIONS: CPT | Performed by: PHYSICAL THERAPIST

## 2024-07-31 NOTE — PROGRESS NOTES
Daily Note     Today's date: 2024  Patient name: Leah Lora  : 1965  MRN: [id number]  Referring provider: Brian Kwok DO  Dx:   Encounter Diagnosis     ICD-10-CM    1. Impingement syndrome of right shoulder  M75.41       2. Chronic right shoulder pain  M25.511     G89.29       3. Bicipital tendinitis of right shoulder  M75.21       4. Superior glenoid labrum lesion of right shoulder, subsequent encounter  S43.431D       5. Other sprain of right shoulder joint, subsequent encounter  S43.491D           Start Time: 1605  Stop Time: 1710  Total time in clinic (min): 65 minutes    Subjective: Patient reports that she's had a little soreness following her previous treatment session, but it has since dissipated.      Objective: See treatment diary below      Assessment: Tolerated treatment well. Patient demonstrated fatigue post treatment and would benefit from continued PT. Patient with improved right shoulder PROM in all planes, but remains restricted into IR.      Plan: Continue per plan of care.  Progress treatment as tolerated.   Progress treament per protocol.      POC expires Unit limit Auth  expiration date PT/OT + Visit Limit?   24 N/A N/A 60                 Visit/Unit Tracking  AUTH Status:  Date 6/25 6/27 7/1 7/8 7/10 7/22 7/24 7/29 7/31      N/A Used 8 9  10 11 12 13 14 15 16       Remaining  52 51 50 49 48 47 46 45 44        Precautions: s/p R shoulder arthroscopic RC-R, open subpectoralis biceps tenodesis, debridement of anterior/superior/posterior labrum tears, SAD (DOS: 5/15/24) ; follow RC-R protocol ; Sling x 6 wks   NO UBE       Manuals  7/8 7/10 7/22 7/24 7/29 7/31    R shoulder PROM per protocol  SC gentle  GR GR GR TS see obj. GR SA GR    R elbow PROM             Gr. II-IV GHJ AP, Inf mobs      nv   GR    Reassessment       GR      Neuro Re-Ed             Scap squeezes Reviewed            Prone row AROM to neutral  10x       2# 2x10 2# 2x10    Webslide: M, L row     "    YTB 2x10 YTB 2x10 ea.                                                         Ther Ex             Patient education: pathophysiology, surgical/rehab overview, post-operative contraindications/precautions,  signs of infection, wound care/edema management, HEP review GR  HEP review          Pendulums (CW/CCW circles, H, V) Reviewed Reviewed.            Elbow AROM Reviewed 2x 10            Wrist AROM Reviewed 2x 10            Gripping Reviewed Green 2x 10            Supine shoulder flexion AAROM with opp UE  2x 10 5\"            Supine ER AAROM with SPC  5\"  15x            Scaption in side lying with UE ranger  2x 10  2x10          Scapular protraction/retraction in side lying with UE ranger  Gentle VC's 2x 10   2x10      YTB 3x10    Submaximal, pain-free IR, ER isometrics  NV 20x5\"  20x5\" 20x5\"        ER AROM in side lying  2x 10  2x10 10x5\"    2# 2x10 2# 2x10    Table slides: F, scap, ER   10x10\" ea. 10x10\" ea.  10x10\"        Pulleys   20x5\" 20x5\" 20x5\" 20x5\"  20x5\" 20x5\" 20x5\"    Fingerladder   10x5\" 10x5\" 10x5\" 10x5\"        Shoulder extension AAROM with SPC    20x5\" 20x5\"  IR AAROM Reviewed      Shoulder flexion, abduction AAROM with SPC     10x5\"        Sleeper str.       10x10\" 10x10\" 2x10 10\"    TB ER        YTB 2x10 YTB 2x10    TB IR        YTB 2x10 YTB 2x10    ER str. At 90/90 against wall                                       Ther Activity                                       Gait Training                                       Modalities                                       Access Code: [email or web address]  URL: [email or web address]  Date: 07/01/2024  Prepared by: Nir Shanks    Exercises  - Seated Shoulder External Rotation PROM on Table  - 1 x daily - 7 x weekly - 2 sets - 10 reps - 10 seconds hold  - Seated Shoulder Scaption Slide at Table Top with Forearm in Neutral  - 1 x daily - 7 x weekly - 2 sets - 10 reps - 10 seconds hold  - Seated Shoulder Flexion Towel Slide at Table Top Full Range of " Motion  - 1 x daily - 7 x weekly - 2 sets - 10 reps - 10 seconds hold  - Supine Shoulder External Rotation with Dowel  - 1 x daily - 7 x weekly - 2 sets - 10 reps - 10 seconds hold  - Supine Shoulder Flexion AAROM  - 1 x daily - 7 x weekly - 2 sets - 10 reps - 10 seconds hold  - Shoulder Flexion Wall Slide with Towel  - 1 x daily - 7 x weekly - 2 sets - 10 reps - 5 seconds hold  - Standing Isometric Shoulder External Rotation with Doorway  - 1 x daily - 4 x weekly - 2 sets - 10 reps - 5 seconds hold  - Standing Isometric Shoulder Internal Rotation at Doorway  - 1 x daily - 4 x weekly - 2 sets - 10 reps - 5 seconds hold  - Sidelying Shoulder External Rotation with Dumbbell  - 1 x daily - 4 x weekly - 2 sets - 10 reps

## 2024-08-02 PROBLEM — Z01.818 PRE-OP EXAMINATION: Status: RESOLVED | Noted: 2024-04-29 | Resolved: 2024-08-02

## 2024-08-05 ENCOUNTER — OFFICE VISIT (OUTPATIENT)
Dept: FAMILY MEDICINE CLINIC | Facility: CLINIC | Age: 59
End: 2024-08-05
Payer: COMMERCIAL

## 2024-08-05 VITALS
DIASTOLIC BLOOD PRESSURE: 60 MMHG | TEMPERATURE: 97.3 F | BODY MASS INDEX: 32.95 KG/M2 | OXYGEN SATURATION: 98 % | HEART RATE: 74 BPM | HEIGHT: 64 IN | WEIGHT: 193 LBS | SYSTOLIC BLOOD PRESSURE: 90 MMHG | RESPIRATION RATE: 16 BRPM

## 2024-08-05 DIAGNOSIS — E78.5 HYPERLIPIDEMIA, UNSPECIFIED HYPERLIPIDEMIA TYPE: ICD-10-CM

## 2024-08-05 DIAGNOSIS — G43.009 MIGRAINE WITHOUT AURA AND WITHOUT STATUS MIGRAINOSUS, NOT INTRACTABLE: ICD-10-CM

## 2024-08-05 DIAGNOSIS — E11.9 TYPE 2 DIABETES MELLITUS WITHOUT COMPLICATION, WITHOUT LONG-TERM CURRENT USE OF INSULIN (HCC): Primary | ICD-10-CM

## 2024-08-05 DIAGNOSIS — E66.01 CLASS 2 SEVERE OBESITY DUE TO EXCESS CALORIES WITH SERIOUS COMORBIDITY AND BODY MASS INDEX (BMI) OF 35.0 TO 35.9 IN ADULT (HCC): ICD-10-CM

## 2024-08-05 DIAGNOSIS — E11.9 TYPE 2 DIABETES MELLITUS WITHOUT COMPLICATION, WITHOUT LONG-TERM CURRENT USE OF INSULIN (HCC): ICD-10-CM

## 2024-08-05 PROCEDURE — 83036 HEMOGLOBIN GLYCOSYLATED A1C: CPT | Performed by: PHYSICIAN ASSISTANT

## 2024-08-05 PROCEDURE — 99214 OFFICE O/P EST MOD 30 MIN: CPT | Performed by: PHYSICIAN ASSISTANT

## 2024-08-05 RX ORDER — LISINOPRIL 2.5 MG/1
2.5 TABLET ORAL DAILY
Qty: 100 TABLET | Refills: 1 | Status: SHIPPED | OUTPATIENT
Start: 2024-08-05

## 2024-08-05 RX ORDER — ROSUVASTATIN CALCIUM 10 MG/1
10 TABLET, COATED ORAL DAILY
Qty: 100 TABLET | Refills: 1 | Status: SHIPPED | OUTPATIENT
Start: 2024-08-05

## 2024-08-05 RX ORDER — SUMATRIPTAN 100 MG/1
100 TABLET, FILM COATED ORAL ONCE AS NEEDED
Qty: 9 TABLET | Refills: 0 | Status: SHIPPED | OUTPATIENT
Start: 2024-08-05

## 2024-08-05 NOTE — PROGRESS NOTES
Assessment/Plan:     Diagnoses and all orders for this visit:    Type 2 diabetes mellitus without complication, without long-term current use of insulin ([facility])  Comments:  Raul is at goal.  Hemoglobin A1c in the office today is 6.1.  Continue current regimen  Orders:  -     CBC and differential  -     Comprehensive metabolic panel  -     Lipid panel  -     lisinopril (ZESTRIL) 2.5 mg tablet; Take 1 tablet (2.5 mg total) by mouth daily    Hyperlipidemia, unspecified hyperlipidemia type  Comments:  Continue statin therapy and low-fat diet.  Check lab  Orders:  -     CBC and differential  -     rosuvastatin (CRESTOR) 10 MG tablet; Take 1 tablet (10 mg total) by mouth daily    Class 2 severe obesity due to excess calories with serious comorbidity and body mass index (BMI) of 35.0 to 35.9 in adult ([facility])  Comments:  Patient continues to diet and exercise to promote weight loss.  She has lost 9 pounds    Migraine without aura and without status migrainosus, not intractable  Comments:  No change in symptomatology.  Refill Imitrex for as needed use  Orders:  -     SUMAtriptan (IMITREX) 100 mg tablet; Take 1 tablet (100 mg total) by mouth once as needed for migraine for up to 9 doses    Hyperlipidemia, unspecified hyperlipidemia type  Comments:  Will start statin therapy.  Side effects reviewed with patient  Orders:  -     CBC and differential  -     rosuvastatin (CRESTOR) 10 MG tablet; Take 1 tablet (10 mg total) by mouth daily    Type 2 diabetes mellitus without complication, without long-term current use of insulin ([facility])  Comments:  Check blood sugar daily  Orders:  -     CBC and differential  -     Comprehensive metabolic panel  -     Lipid panel  -     lisinopril (ZESTRIL) 2.5 mg tablet; Take 1 tablet (2.5 mg total) by mouth daily          Subjective:      Patient ID: Leah Lora is a 59 y.o. female.    Patient presents to the office for follow-up chronic condition.  Patient has non-insulin diabetes mellitus type 2.   Is currently on Ozempic 4 mg sected weekly.  Patient tolerating medication well.  GI side effects.  Patient checks her blood sugar daily.  She is also on lisinopril 2.5 mg for renal protection.  She has lost 9 pounds.  For hyperlipidemia she is on Crestor 10 mg.  Patient also has a history of migraine she is on Imitrex as needed.        The following portions of the patient's history were reviewed and updated as appropriate: She   Patient Active Problem List    Diagnosis Date Noted    Primary osteoarthritis of left knee 10/18/2022    Lipoma 09/29/2017    Thyroid nodule 09/29/2017    Type 2 diabetes mellitus without complication, without long-term current use of insulin ([facility]) 02/07/2017    Hyperlipidemia 02/07/2017    Class 2 severe obesity due to excess calories with serious comorbidity in adult ([facility]) 02/07/2017    Shoulder impingement, right 05/11/2016    Common migraine without aura 08/03/2012     Current Outpatient Medications   Medication Sig Dispense Refill    Blood Glucose Monitoring Suppl (OneTouch Verio Reflect) w/Device KIT Check blood sugars once daily. Please substitute with appropriate alternative as covered by patient's insurance. Dx: E11.65 1 kit 0    glucose blood (OneTouch Verio) test strip USE AS DIRECTED DAILY 50 strip 2    lisinopril (ZESTRIL) 2.5 mg tablet Take 1 tablet (2.5 mg total) by mouth daily 100 tablet 1    OneTouch Delica Lancets 33G MISC Check blood sugars once daily. Please substitute with appropriate alternative as covered by patient's insurance. Dx: E11.65 100 each 3    rosuvastatin (CRESTOR) 10 MG tablet Take 1 tablet (10 mg total) by mouth daily 100 tablet 1    semaglutide, 1 mg/dose, (Ozempic) 4 mg/3 mL injection pen Inject 0.75 mL (1 mg total) under the skin once a week 9 mL 0    SUMAtriptan (IMITREX) 100 mg tablet Take 1 tablet (100 mg total) by mouth once as needed for migraine for up to 9 doses 9 tablet 0     No current facility-administered medications for this visit.     She  has No Known Allergies..    Review of Systems   Constitutional:  Negative for activity change and unexpected weight change.   HENT:  Negative for ear pain and sore throat.    Eyes:  Negative for visual disturbance.   Respiratory:  Negative for cough, shortness of breath and wheezing.    Cardiovascular:  Negative for chest pain and leg swelling.   Gastrointestinal:  Negative for abdominal pain, blood in stool, constipation, diarrhea, nausea and vomiting.   Genitourinary:  Negative for difficulty urinating.   Musculoskeletal:  Positive for arthralgias. Negative for myalgias.        Right   shoulder   Skin:  Negative for rash.   Neurological:  Negative for dizziness, syncope, light-headedness and headaches.   Psychiatric/Behavioral:  Negative for self-injury, sleep disturbance and suicidal ideas. The patient is not nervous/anxious.          Objective:        Physical Exam  Vitals and nursing note reviewed.   Constitutional:       General: She is not in acute distress.     Appearance: She is well-developed. She is obese. She is not ill-appearing or diaphoretic.   HENT:      Head: Normocephalic and atraumatic.      Right Ear: Tympanic membrane, ear canal and external ear normal.      Left Ear: Tympanic membrane, ear canal and external ear normal.      Mouth/Throat:      Pharynx: No posterior oropharyngeal erythema.   Eyes:      Conjunctiva/sclera: Conjunctivae normal.      Pupils: Pupils are equal, round, and reactive to light.   Neck:      Thyroid: No thyromegaly.      Vascular: No carotid bruit.   Cardiovascular:      Rate and Rhythm: Normal rate and regular rhythm.      Pulses: no weak pulses.           Dorsalis pedis pulses are 2+ on the right side and 2+ on the left side.      Heart sounds: Normal heart sounds. No murmur heard.     No friction rub. No gallop.   Pulmonary:      Effort: Pulmonary effort is normal. No respiratory distress.      Breath sounds: Normal breath sounds. No wheezing.   Abdominal:       General: Bowel sounds are normal. There is no distension.      Palpations: Abdomen is soft. There is no mass.      Tenderness: There is no abdominal tenderness.   Musculoskeletal:      Right lower leg: No edema.      Left lower leg: No edema.   Feet:      Right foot:      Skin integrity: No ulcer, skin breakdown, erythema, warmth, callus or dry skin.      Left foot:      Skin integrity: No ulcer, skin breakdown, erythema, warmth, callus or dry skin.   Lymphadenopathy:      Cervical: No cervical adenopathy.   Skin:     General: Skin is warm and dry.      Findings: No erythema or rash.   Neurological:      General: No focal deficit present.      Mental Status: She is alert and oriented to person, place, and time.   Psychiatric:         Mood and Affect: Mood normal.         Behavior: Behavior normal.         Thought Content: Thought content normal.         Judgment: Judgment normal.     Diabetic Foot Exam    Patient's shoes and socks removed.    Right Foot/Ankle   Right Foot Inspection  Skin Exam: skin normal and skin intact. No dry skin, no warmth, no callus, no erythema, no maceration, no abnormal color, no pre-ulcer, no ulcer and no callus.     Toe Exam: No swelling, no tenderness, erythema and  no right toe deformity    Sensory   Vibration: intact  Monofilament testing: intact    Vascular  The right DP pulse is 2+.     Left Foot/Ankle  Left Foot Inspection  Skin Exam: skin normal and skin intact. No dry skin, no warmth, no erythema, no maceration, normal color, no pre-ulcer, no ulcer and no callus.     Toe Exam: No swelling, no tenderness, no erythema and no left toe deformity.     Sensory   Vibration: intact  Monofilament testing: intact    Vascular  The left DP pulse is 2+.     Assign Risk Category  No deformity present  No loss of protective sensation  No weak pulses  Risk: 0

## 2024-08-13 LAB — SL AMB POCT HEMOGLOBIN AIC: 6.1 (ref ?–6.5)

## 2024-09-06 DIAGNOSIS — E11.9 TYPE 2 DIABETES MELLITUS WITHOUT COMPLICATION, WITHOUT LONG-TERM CURRENT USE OF INSULIN (HCC): ICD-10-CM

## 2024-09-06 RX ORDER — SEMAGLUTIDE 1.34 MG/ML
INJECTION, SOLUTION SUBCUTANEOUS
Qty: 9 ML | Refills: 0 | Status: SHIPPED | OUTPATIENT
Start: 2024-09-06

## 2024-09-12 DIAGNOSIS — E04.1 THYROID NODULE: Primary | ICD-10-CM

## 2024-10-04 ENCOUNTER — HOSPITAL ENCOUNTER (OUTPATIENT)
Dept: RADIOLOGY | Facility: MEDICAL CENTER | Age: 59
Discharge: HOME | End: 2024-10-04
Payer: COMMERCIAL

## 2024-10-04 DIAGNOSIS — E04.1 THYROID NODULE: ICD-10-CM

## 2024-10-04 PROCEDURE — 76536 US EXAM OF HEAD AND NECK: CPT

## 2024-11-13 DIAGNOSIS — E11.9 TYPE 2 DIABETES MELLITUS WITHOUT COMPLICATION, WITHOUT LONG-TERM CURRENT USE OF INSULIN (HCC): ICD-10-CM

## 2024-11-14 RX ORDER — SEMAGLUTIDE 1.34 MG/ML
INJECTION, SOLUTION SUBCUTANEOUS
Qty: 9 ML | Refills: 1 | Status: SHIPPED | OUTPATIENT
Start: 2024-11-14

## 2024-11-26 ENCOUNTER — OFFICE VISIT (OUTPATIENT)
Dept: OBGYN CLINIC | Facility: CLINIC | Age: 59
End: 2024-11-26
Payer: COMMERCIAL

## 2024-11-26 VITALS
SYSTOLIC BLOOD PRESSURE: 105 MMHG | WEIGHT: 189 LBS | HEIGHT: 64 IN | DIASTOLIC BLOOD PRESSURE: 70 MMHG | OXYGEN SATURATION: 95 % | HEART RATE: 71 BPM | BODY MASS INDEX: 32.27 KG/M2

## 2024-11-26 DIAGNOSIS — M65.341 TRIGGER FINGER, RIGHT RING FINGER: Primary | ICD-10-CM

## 2024-11-26 PROCEDURE — 20550 NJX 1 TENDON SHEATH/LIGAMENT: CPT | Performed by: FAMILY MEDICINE

## 2024-11-26 PROCEDURE — 99213 OFFICE O/P EST LOW 20 MIN: CPT | Performed by: FAMILY MEDICINE

## 2024-11-26 RX ORDER — BUPIVACAINE HYDROCHLORIDE 2.5 MG/ML
0.5 INJECTION, SOLUTION INFILTRATION; PERINEURAL
Status: COMPLETED | OUTPATIENT
Start: 2024-11-26 | End: 2024-11-26

## 2024-11-26 RX ORDER — TRIAMCINOLONE ACETONIDE 40 MG/ML
20 INJECTION, SUSPENSION INTRA-ARTICULAR; INTRAMUSCULAR
Status: COMPLETED | OUTPATIENT
Start: 2024-11-26 | End: 2024-11-26

## 2024-11-26 RX ADMIN — BUPIVACAINE HYDROCHLORIDE 0.5 ML: 2.5 INJECTION, SOLUTION INFILTRATION; PERINEURAL at 09:15

## 2024-11-26 RX ADMIN — TRIAMCINOLONE ACETONIDE 20 MG: 40 INJECTION, SUSPENSION INTRA-ARTICULAR; INTRAMUSCULAR at 09:15

## 2024-11-26 NOTE — PROGRESS NOTES
Assessment/Plan:  Assessment & Plan   Diagnoses and all orders for this visit:    Trigger finger, right ring finger  -     Hand/upper extremity injection: R ring A1      59-year-old right-hand-dominant female right ring finger locking and snapping 8 months duration.  Discussed with patient physical exam, impression, and plan.  Clinical exam and history suggest that she has symptoms from trigger finger.  I discussed treatment regimen of steroid injections which she agreed.  I administered mixture 0.5 cc 0.25% bupivacaine and 0.5 cc Kenalog the right ring finger A1 pulley without complication.  She was advised she is allowed 2 injections prior to surgeon evaluation being recommended.  She will follow-up as needed.      Subjective:   Patient ID: Leah Lora is a 59 y.o. female.  Chief Complaint   Patient presents with    Right Ring Finger - Locking, Pain        59-year-old right-hand-dominant female presents evaluation of right ring finger locking and snapping 8 months duration.  She denies trauma or inciting event.  Pain described as gradual in onset, localized to the base of the ring finger at the A1 pulley, nonradiating, throbbing and burning, worse with gripping activities, associate with locking and snapping of the finger, and improved with resting.  She has tried topical anesthetics and massaging to help with symptoms.    Hand Pain  This is a new problem. The current episode started more than 1 month ago. The problem occurs daily. The problem has been gradually worsening. Associated symptoms include arthralgias and numbness. Pertinent negatives include no joint swelling or weakness. Exacerbated by: Gripping. She has tried rest and position changes (Massage, topical anesthetics) for the symptoms. The treatment provided mild relief.               Review of Systems   Musculoskeletal:  Positive for arthralgias. Negative for joint swelling.   Neurological:  Positive for numbness. Negative for weakness.  "      Objective:  Vitals:    11/26/24 0902   BP: 105/70   Pulse: 71   SpO2: 95%   Weight: 85.7 kg (189 lb)   Height: 5' 4\" (1.626 m)      Right Hand Exam     Range of Motion   The patient has normal right wrist ROM.   Hand   MP Ring: normal   PIP Ring: normal   DIP Ring: normal     Muscle Strength   Wrist extension: 5/5   Wrist flexion: 5/5   : 5/5     Tests   Finkelstein's test: negative    Other   Sensation: normal  Pulse: present    Comments:  Ring finger  -Tenderness at the A1 pulley  - Palpable flexor tendon nodule at the A1 pulley  - Reproduction of locking and snapping with flexion and extension          Strength/Myotome Testing     Right Wrist/Hand   Wrist extension: 5  Wrist flexion: 5    Tests     Right Wrist/Hand   Negative Finkelstein's.       Physical Exam  Vitals and nursing note reviewed.   Constitutional:       Appearance: Normal appearance. She is well-developed. She is not ill-appearing or diaphoretic.   HENT:      Head: Normocephalic and atraumatic.      Right Ear: External ear normal.      Left Ear: External ear normal.   Eyes:      Conjunctiva/sclera: Conjunctivae normal.   Neck:      Trachea: No tracheal deviation.   Cardiovascular:      Rate and Rhythm: Normal rate.   Pulmonary:      Effort: Pulmonary effort is normal. No respiratory distress.   Abdominal:      General: There is no distension.   Musculoskeletal:         General: Tenderness present.   Skin:     General: Skin is warm and dry.      Coloration: Skin is not jaundiced or pale.   Neurological:      Mental Status: She is alert and oriented to person, place, and time.   Psychiatric:         Mood and Affect: Mood normal.         Behavior: Behavior normal.         Thought Content: Thought content normal.         Judgment: Judgment normal.         Hand/upper extremity injection: R ring A1  Universal Protocol:  procedure performed by consultantConsent: Verbal consent obtained.  Risks and benefits: risks, benefits and alternatives were " "discussed  Consent given by: patient  Time out: Immediately prior to procedure a \"time out\" was called to verify the correct patient, procedure, equipment, support staff and site/side marked as required.  Patient understanding: patient states understanding of the procedure being performed  Patient consent: the patient's understanding of the procedure matches consent given  Procedure consent: procedure consent matches procedure scheduled  Relevant documents: relevant documents present and verified  Test results: test results available and properly labeled  Site marked: the operative site was marked  Radiology Images displayed and confirmed. If images not available, report reviewed: imaging studies available  Required items: required blood products, implants, devices, and special equipment available  Patient identity confirmed: verbally with patient  Supporting Documentation  Indications: pain   Procedure Details  Condition:trigger finger Location: ring finger - R ring A1   Preparation: Patient was prepped and draped in the usual sterile fashion  Needle size: 27 G  Ultrasound guidance: no  Approach: volar  Medications administered: 0.5 mL bupivacaine 0.25 %; 20 mg triamcinolone acetonide 40 mg/mL  Patient tolerance: patient tolerated the procedure well with no immediate complications  Dressing:  Sterile dressing applied                   "

## 2025-01-09 DIAGNOSIS — E11.9 TYPE 2 DIABETES MELLITUS WITHOUT COMPLICATION, WITHOUT LONG-TERM CURRENT USE OF INSULIN (HCC): ICD-10-CM

## 2025-01-09 NOTE — TELEPHONE ENCOUNTER
Reason for call:   [x] Refill   [] Prior Auth  [] Other:     Office:   [x] PCP/Provider -   [] Specialty/Provider -     Medication: lisinopril (ZESTRIL) 2.5 mg     Dose/Frequency: Take 1 tablet (2.5 mg total) by mouth daily     Quantity: 100    Pharmacy: [id number] - [place], [place] - [address]     Does the patient have enough for 3 days?   [x] Yes   [] No - Send as HP to POD

## 2025-01-10 RX ORDER — LISINOPRIL 2.5 MG/1
2.5 TABLET ORAL DAILY
Qty: 100 TABLET | Refills: 1 | Status: SHIPPED | OUTPATIENT
Start: 2025-01-10

## 2025-01-27 ENCOUNTER — RESULTS FOLLOW-UP (OUTPATIENT)
Dept: FAMILY MEDICINE CLINIC | Facility: CLINIC | Age: 60
End: 2025-01-27

## 2025-01-27 LAB
ALBUMIN SERPL-MCNC: 4.5 G/DL (ref 3.5–5.7)
ALP SERPL-CCNC: 98 U/L (ref 35–120)
ALT SERPL-CCNC: 30 U/L
ANION GAP SERPL CALCULATED.3IONS-SCNC: 10 MMOL/L (ref 3–11)
AST SERPL-CCNC: 22 U/L
BASOPHILS # BLD AUTO: 0 THOU/CMM (ref 0–0.1)
BASOPHILS NFR BLD AUTO: 1 %
BILIRUB SERPL-MCNC: 0.9 MG/DL (ref 0.2–1)
BUN SERPL-MCNC: 16 MG/DL (ref 7–25)
CALCIUM SERPL-MCNC: 9.5 MG/DL (ref 8.5–10.5)
CHLORIDE SERPL-SCNC: 103 MMOL/L (ref 100–109)
CHOLEST SERPL-MCNC: 206 MG/DL
CHOLEST/HDLC SERPL: 3.4 {RATIO}
CO2 SERPL-SCNC: 27 MMOL/L (ref 21–31)
CREAT SERPL-MCNC: 0.66 MG/DL (ref 0.4–1.1)
CYTOLOGY CMNT CVX/VAG CYTO-IMP: ABNORMAL
DIFFERENTIAL METHOD BLD: ABNORMAL
EOSINOPHIL # BLD AUTO: 0.1 THOU/CMM (ref 0–0.5)
EOSINOPHIL NFR BLD AUTO: 1 %
ERYTHROCYTE [DISTWIDTH] IN BLOOD BY AUTOMATED COUNT: 14 % (ref 12–16)
GFR/BSA.PRED SERPLBLD CYS-BASED-ARV: 100 ML/MIN/{1.73_M2}
GLUCOSE SERPL-MCNC: 125 MG/DL (ref 65–99)
HCT VFR BLD AUTO: 44.8 % (ref 35–43)
HDLC SERPL-MCNC: 60 MG/DL (ref 23–92)
HGB BLD-MCNC: 14.8 G/DL (ref 11.5–14.5)
LDLC SERPL CALC-MCNC: 116 MG/DL
LYMPHOCYTES # BLD AUTO: 1.9 THOU/CMM (ref 1–3)
LYMPHOCYTES NFR BLD AUTO: 26 %
MCH RBC QN AUTO: 30.6 PG (ref 26–34)
MCHC RBC AUTO-ENTMCNC: 33 G/DL (ref 32–37)
MCV RBC AUTO: 93 FL (ref 80–100)
MONOCYTES # BLD AUTO: 0.5 THOU/CMM (ref 0.3–1)
MONOCYTES NFR BLD AUTO: 7 %
NEUTROPHILS # BLD AUTO: 4.7 THOU/CMM (ref 1.8–7.8)
NEUTROPHILS NFR BLD AUTO: 65 %
NONHDLC SERPL-MCNC: 146 MG/DL
PLATELET # BLD AUTO: 267 THOU/CMM (ref 140–350)
PMV BLD REES-ECKER: 9.8 FL (ref 7.5–11.3)
POTASSIUM SERPL-SCNC: 4.5 MMOL/L (ref 3.5–5.2)
PROT SERPL-MCNC: 7 G/DL (ref 6.3–8.3)
RBC # BLD AUTO: 4.83 MILL/CMM (ref 3.7–4.7)
SODIUM SERPL-SCNC: 140 MMOL/L (ref 135–145)
TRIGL SERPL-MCNC: 152 MG/DL
WBC # BLD AUTO: 7.2 THOU/CMM (ref 4–10)

## 2025-01-31 PROBLEM — E66.09 CLASS 1 OBESITY DUE TO EXCESS CALORIES WITH SERIOUS COMORBIDITY AND BODY MASS INDEX (BMI) OF 32.0 TO 32.9 IN ADULT: Status: ACTIVE | Noted: 2017-02-07

## 2025-02-03 ENCOUNTER — OFFICE VISIT (OUTPATIENT)
Dept: FAMILY MEDICINE CLINIC | Facility: CLINIC | Age: 60
End: 2025-02-03
Payer: COMMERCIAL

## 2025-02-03 VITALS
BODY MASS INDEX: 33.09 KG/M2 | TEMPERATURE: 97.4 F | DIASTOLIC BLOOD PRESSURE: 70 MMHG | HEIGHT: 64 IN | OXYGEN SATURATION: 99 % | HEART RATE: 73 BPM | WEIGHT: 193.8 LBS | SYSTOLIC BLOOD PRESSURE: 122 MMHG

## 2025-02-03 DIAGNOSIS — E11.9 TYPE 2 DIABETES MELLITUS WITHOUT COMPLICATION, WITHOUT LONG-TERM CURRENT USE OF INSULIN (HCC): ICD-10-CM

## 2025-02-03 DIAGNOSIS — G43.009 MIGRAINE WITHOUT AURA AND WITHOUT STATUS MIGRAINOSUS, NOT INTRACTABLE: ICD-10-CM

## 2025-02-03 DIAGNOSIS — Z00.00 ANNUAL PHYSICAL EXAM: ICD-10-CM

## 2025-02-03 DIAGNOSIS — E78.5 HYPERLIPIDEMIA, UNSPECIFIED HYPERLIPIDEMIA TYPE: Primary | ICD-10-CM

## 2025-02-03 DIAGNOSIS — Z00.00 WELL ADULT EXAM: ICD-10-CM

## 2025-02-03 LAB — SL AMB POCT HEMOGLOBIN AIC: 6 (ref ?–6.5)

## 2025-02-03 PROCEDURE — 99213 OFFICE O/P EST LOW 20 MIN: CPT | Performed by: PHYSICIAN ASSISTANT

## 2025-02-03 PROCEDURE — 99396 PREV VISIT EST AGE 40-64: CPT | Performed by: PHYSICIAN ASSISTANT

## 2025-02-03 PROCEDURE — 83036 HEMOGLOBIN GLYCOSYLATED A1C: CPT | Performed by: PHYSICIAN ASSISTANT

## 2025-02-03 RX ORDER — TIRZEPATIDE 2.5 MG/.5ML
2.5 INJECTION, SOLUTION SUBCUTANEOUS WEEKLY
Qty: 2 ML | Refills: 0 | Status: SHIPPED | OUTPATIENT
Start: 2025-02-03 | End: 2025-02-03 | Stop reason: SDUPTHER

## 2025-02-03 RX ORDER — TIRZEPATIDE 2.5 MG/.5ML
2.5 INJECTION, SOLUTION SUBCUTANEOUS WEEKLY
Qty: 2 ML | Refills: 0 | Status: SHIPPED | OUTPATIENT
Start: 2025-02-03

## 2025-02-03 NOTE — ASSESSMENT & PLAN NOTE
Stable continue as needed Imitrex for breakthrough headachesDiabetic Foot Exam    Diabetic Foot Exam

## 2025-02-03 NOTE — PATIENT INSTRUCTIONS
"Patient Education     Routine physical for adults   The Basics   Written by the doctors and editors at [facility]   What is a physical? -- A physical is a routine visit, or \"check-up,\" with your doctor. You might also hear it called a \"wellness visit\" or \"preventive visit.\"  During each visit, the doctor will:   Ask about your physical and mental health   Ask about your habits, behaviors, and lifestyle   Do an exam   Give you vaccines if needed   Talk to you about any medicines you take   Give advice about your health   Answer your questions  Getting regular check-ups is an important part of taking care of your health. It can help your doctor find and treat any problems you have. But it's also important for preventing health problems.  A routine physical is different from a \"sick visit.\" A sick visit is when you see a doctor because of a health concern or problem. Since physicals are scheduled ahead of time, you can think about what you want to ask the doctor.  How often should I get a physical? -- It depends on your age and health. In general, for people age 21 years and older:   If you are younger than 50 years, you might be able to get a physical every 3 years.   If you are 50 years or older, your doctor might recommend a physical every year.  If you have an ongoing health condition, like diabetes or high blood pressure, your doctor will probably want to see you more often.  What happens during a physical? -- In general, each visit will include:   Physical exam - The doctor or nurse will check your height, weight, heart rate, and blood pressure. They will also look at your eyes and ears. They will ask about how you are feeling and whether you have any symptoms that bother you.   Medicines - It's a good idea to bring a list of all the medicines you take to each doctor visit. Your doctor will talk to you about your medicines and answer any questions. Tell them if you are having any side effects that bother you. You " "should also tell them if you are having trouble paying for any of your medicines.   Habits and behaviors - This includes:   Your diet   Your exercise habits   Whether you smoke, drink alcohol, or use drugs   Whether you are sexually active   Whether you feel safe at home  Your doctor will talk to you about things you can do to improve your health and lower your risk of health problems. They will also offer help and support. For example, if you want to quit smoking, they can give you advice and might prescribe medicines. If you want to improve your diet or get more physical activity, they can help you with this, too.   Lab tests, if needed - The tests you get will depend on your age and situation. For example, your doctor might want to check your:   Cholesterol   Blood sugar   Iron level   Vaccines - The recommended vaccines will depend on your age, health, and what vaccines you already had. Vaccines are very important because they can prevent certain serious or deadly infections.   Discussion of screening - \"Screening\" means checking for diseases or other health problems before they cause symptoms. Your doctor can recommend screening based on your age, risk, and preferences. This might include tests to check for:   Cancer, such as breast, prostate, cervical, ovarian, colorectal, prostate, lung, or skin cancer   Sexually transmitted infections, such as chlamydia and gonorrhea   Mental health conditions like depression and anxiety  Your doctor will talk to you about the different types of screening tests. They can help you decide which screenings to have. They can also explain what the results might mean.   Answering questions - The physical is a good time to ask the doctor or nurse questions about your health. If needed, they can refer you to other doctors or specialists, too.  Adults older than 65 years often need other care, too. As you get older, your doctor will talk to you about:   How to prevent falling at " home   Hearing or vision tests   Memory testing   How to take your medicines safely   Making sure that you have the help and support you need at home  All topics are updated as new evidence becomes available and our peer review process is complete.  This topic retrieved from [employer] on: May 02, 2024.  Topic 132378 Version 1.0  Release: 32.4.3 - C32.122  © 2024 UpToDate, Inc. and/or its affiliates. All rights reserved.  Consumer Information Use and Disclaimer   Disclaimer: This generalized information is a limited summary of diagnosis, treatment, and/or medication information. It is not meant to be comprehensive and should be used as a tool to help the user understand and/or assess potential diagnostic and treatment options. It does NOT include all information about conditions, treatments, medications, side effects, or risks that may apply to a specific patient. It is not intended to be medical advice or a substitute for the medical advice, diagnosis, or treatment of a health care provider based on the health care provider's examination and assessment of a patient's specific and unique circumstances. Patients must speak with a health care provider for complete information about their health, medical questions, and treatment options, including any risks or benefits regarding use of medications. This information does not endorse any treatments or medications as safe, effective, or approved for treating a specific patient. Up[employer], Inc[employer] and its affiliates disclaim any warranty or liability relating to this information or the use thereof.The use of this information is governed by the Terms of Use, available at https://www.woltersCollegePostingsuwer.com/en/know/clinical-effectiveness-terms. 2024© Up[employer]Date, Inc. and its affiliates and/or licensors. All rights reserved.  Copyright   © 2024 [employer], [employer] and/or its affiliates. All rights reserved.

## 2025-02-03 NOTE — PROGRESS NOTES
Adult Annual Physical  Name: Leah Lora      : 1965      MRN: [id number]  Encounter Provider: Gladys Camarena PA-C  Encounter Date: 2/3/2025   Encounter department: [facility]  Patient presents in the office for her annual physical exam and follow-up chronic conditions.  Patient has non-insulin diabetes mellitus type 2.  She is currently on Ozempic 1 mg injected weekly.  Controlling her diabetes.  Her current A1c is 6.0.  Still is 193 pounds with a BMI of 33.2.  She has not lost any weight.  Has cut her portions back.  She is not exercising.  Also skips meals.  The importance of small frequent meals regularly throughout the day.  Also explained importance of exercise.  She would like to try Mounjaro.  GRAIN headaches she is using Imitrex 100 mg as needed.  These are stable.  Is taking lisinopril 2.5 mg for renal protection.  She is also on rosuvastatin for hyperlipidemia.        Assessment & Plan  Type 2 diabetes mellitus without complication, without long-term current use of insulin ([facility])    Lab Results   Component Value Date    HGBA1C 6.0 2025       Orders:    POCT hemoglobin A1c    Tirzepatide (Mounjaro) 2.5 MG/0.5ML SOAJ; Inject 2.5 mg under the skin once a week  Diabetes is acceptable.  She would like to try a different GLP-1.  Mounjaro ordered  Hyperlipidemia, unspecified hyperlipidemia type  Lipids at goal continue Crestor 10 mg and low-fat diet       Well adult exam         Migraine without aura and without status migrainosus, not intractable  Stable continue as needed Imitrex for breakthrough headachesDiabetic Foot Exam    Diabetic Foot Exam       Annual physical exam         Immunizations and preventive care screenings were discussed with patient today. Appropriate education was printed on patient's after visit summary.    Counseling:  Exercise: the importance of regular exercise/physical activity was discussed. Recommend exercise 3-5 times per week for at least 30  "minutes.       Depression Screening and Follow-up Plan: Patient was screened for depression during today's encounter. They screened negative with a PHQ-2 score of 0.        History of Present Illness     Adult Annual Physical:  Patient presents for annual physical.     Diet and Physical Activity:  - Diet/Nutrition: well balanced diet and low carb diet.  - Exercise: no formal exercise.    Depression Screening:  - PHQ-2 Score: 0    General Health:  - Sleep: sleeps poorly.  - Hearing: normal hearing bilateral ears.  - Vision: wears contacts and most recent eye exam < 1 year ago.  - Dental: no dental visits for > 1 year.    /GYN Health:  - Follows with GYN: no.   - Menopause: postmenopausal.   - Contraception: menopause.      Advanced Care Planning:  - Has an advanced directive?: no    - Has a durable medical POA?: no    - ACP document given to patient?: no      Review of Systems   Constitutional:  Negative for activity change and unexpected weight change.   HENT:  Negative for ear pain and sore throat.    Eyes:  Negative for visual disturbance.   Respiratory:  Negative for cough, shortness of breath and wheezing.    Cardiovascular:  Negative for chest pain and leg swelling.   Gastrointestinal:  Negative for abdominal pain, blood in stool, constipation, diarrhea, nausea and vomiting.   Genitourinary:  Negative for difficulty urinating.   Musculoskeletal:  Positive for arthralgias. Negative for myalgias.   Skin:  Negative for rash.   Neurological:  Negative for dizziness, syncope, light-headedness and headaches.   Psychiatric/Behavioral:  Negative for self-injury, sleep disturbance and suicidal ideas. The patient is not nervous/anxious.          Objective   /70 (BP Location: Left arm, Patient Position: Sitting, Cuff Size: Large)   Pulse 73   Temp (!) 97.4 °F (36.3 °C) (Temporal)   Ht 5' 4\" (1.626 m)   Wt 87.9 kg (193 lb 12.8 oz)   SpO2 99%   Breastfeeding No   BMI 33.27 kg/m²     Physical Exam  Vitals and " nursing note reviewed.   Constitutional:       General: She is not in acute distress.     Appearance: Normal appearance. She is well-developed. She is not ill-appearing or diaphoretic.   HENT:      Head: Normocephalic and atraumatic.      Right Ear: Tympanic membrane, ear canal and external ear normal.      Left Ear: Tympanic membrane, ear canal and external ear normal.      Mouth/Throat:      Pharynx: No posterior oropharyngeal erythema.   Eyes:      Conjunctiva/sclera: Conjunctivae normal.      Pupils: Pupils are equal, round, and reactive to light.   Neck:      Thyroid: No thyromegaly.      Vascular: No carotid bruit.   Cardiovascular:      Rate and Rhythm: Normal rate and regular rhythm.      Pulses: no weak pulses.           Dorsalis pedis pulses are 2+ on the right side and 2+ on the left side.      Heart sounds: Normal heart sounds. No murmur heard.     No friction rub.   Pulmonary:      Effort: Pulmonary effort is normal. No respiratory distress.      Breath sounds: Normal breath sounds. No wheezing.   Abdominal:      General: Bowel sounds are normal. There is no distension.      Palpations: Abdomen is soft. There is no mass.      Tenderness: There is no abdominal tenderness.   Musculoskeletal:      Right lower leg: No edema.      Left lower leg: No edema.   Feet:      Right foot:      Skin integrity: No callus or dry skin.      Left foot:      Skin integrity: No ulcer, callus or dry skin.   Lymphadenopathy:      Cervical: No cervical adenopathy.   Skin:     General: Skin is warm and dry.   Neurological:      General: No focal deficit present.      Mental Status: She is alert and oriented to person, place, and time.   Psychiatric:         Mood and Affect: Mood normal.         Behavior: Behavior normal.         Thought Content: Thought content normal.         Judgment: Judgment normal.     Diabetic Foot Exam    Patient's shoes and socks removed.    Right Foot/Ankle   Right Foot Inspection  Skin Exam: skin  intact. No dry skin, no callus and no callus.     Toe Exam: right toe deformity.     Sensory   Vibration: intact  Monofilament testing: intact    Vascular  The right DP pulse is 2+.     Left Foot/Ankle  Left Foot Inspection  Skin Exam: skin intact. No dry skin, no ulcer and no callus.     Toe Exam: left toe deformity.     Sensory   Monofilament testing: intact    Vascular  The left DP pulse is 2+.     Assign Risk Category  Deformity present  No loss of protective sensation  No weak pulses  Risk: 0

## 2025-02-03 NOTE — ASSESSMENT & PLAN NOTE
Lab Results   Component Value Date    HGBA1C 6.0 02/03/2025       Orders:    POCT hemoglobin A1c    Tirzepatide (Mounjaro) 2.5 MG/0.5ML SOAJ; Inject 2.5 mg under the skin once a week  Diabetes is acceptable.  She would like to try a different GLP-1.  Mounjaro ordered

## 2025-02-24 ENCOUNTER — HOSPITAL ENCOUNTER (OUTPATIENT)
Dept: RADIOLOGY | Facility: MEDICAL CENTER | Age: 60
Discharge: HOME | End: 2025-02-24
Payer: COMMERCIAL

## 2025-02-24 VITALS — WEIGHT: 193 LBS | HEIGHT: 64 IN | BODY MASS INDEX: 32.95 KG/M2

## 2025-02-24 DIAGNOSIS — Z12.31 ENCOUNTER FOR SCREENING MAMMOGRAM FOR MALIGNANT NEOPLASM OF BREAST: ICD-10-CM

## 2025-02-24 PROCEDURE — 77067 SCR MAMMO BI INCL CAD: CPT

## 2025-02-24 PROCEDURE — 77063 BREAST TOMOSYNTHESIS BI: CPT

## 2025-02-26 ENCOUNTER — RESULTS FOLLOW-UP (OUTPATIENT)
Dept: FAMILY MEDICINE CLINIC | Facility: CLINIC | Age: 60
End: 2025-02-26

## 2025-02-28 ENCOUNTER — TELEPHONE (OUTPATIENT)
Dept: FAMILY MEDICINE CLINIC | Facility: CLINIC | Age: 60
End: 2025-02-28

## 2025-02-28 DIAGNOSIS — E11.9 TYPE 2 DIABETES MELLITUS WITHOUT COMPLICATION, WITHOUT LONG-TERM CURRENT USE OF INSULIN (HCC): Primary | ICD-10-CM

## 2025-02-28 DIAGNOSIS — E11.9 TYPE 2 DIABETES MELLITUS WITHOUT COMPLICATION, WITHOUT LONG-TERM CURRENT USE OF INSULIN (HCC): ICD-10-CM

## 2025-02-28 RX ORDER — TIRZEPATIDE 2.5 MG/.5ML
2.5 INJECTION, SOLUTION SUBCUTANEOUS WEEKLY
Qty: 2 ML | Refills: 0 | OUTPATIENT
Start: 2025-02-28

## 2025-02-28 RX ORDER — TIRZEPATIDE 5 MG/.5ML
5 INJECTION, SOLUTION SUBCUTANEOUS WEEKLY
Qty: 2 ML | Refills: 0 | Status: SHIPPED | OUTPATIENT
Start: 2025-02-28

## 2025-02-28 NOTE — TELEPHONE ENCOUNTER
Patient called requesting refill for Tirzepatide (Mounjaro) 5 MG/0.5ML SOAJ. Patient made aware medication was refilled on 2/28/25 for 2 ml with 0 refills to [id number] - [place], [place] - [address] [place] [address]  pharmacy. Patient instructed to contact the pharmacy to obtain refills of medication. Patient verbalized understanding.

## 2025-02-28 NOTE — TELEPHONE ENCOUNTER
Patient is requesting Mounjaro be sent to [id number] - [place] - [address] [phone]     How are you tolerating the medication?   [] Nausea  [] Vomiting  [] Diarrhea  [x] Asymptomatic  [] Other:    Last visit weight:193    Current weight:192    Date of last injection:2/26/25    How many injections do you have left:2

## 2025-03-27 ENCOUNTER — TELEPHONE (OUTPATIENT)
Dept: FAMILY MEDICINE CLINIC | Facility: CLINIC | Age: 60
End: 2025-03-27

## 2025-03-27 DIAGNOSIS — E11.9 TYPE 2 DIABETES MELLITUS WITHOUT COMPLICATION, WITHOUT LONG-TERM CURRENT USE OF INSULIN (HCC): Primary | ICD-10-CM

## 2025-03-27 RX ORDER — TIRZEPATIDE 7.5 MG/.5ML
7.5 INJECTION, SOLUTION SUBCUTANEOUS WEEKLY
Qty: 2 ML | Refills: 0 | Status: SHIPPED | OUTPATIENT
Start: 2025-03-27

## 2025-03-27 NOTE — TELEPHONE ENCOUNTER
How are you tolerating the medication?   [] Nausea  [] Vomiting  [] Diarrhea  [x] Asymptomatic  [] Other:    Last visit weight:193lb    Current weight:193    Date of last injection:3/26/2025    How many injections do you have left:2     Looking to increase

## 2025-04-22 DIAGNOSIS — E11.9 TYPE 2 DIABETES MELLITUS WITHOUT COMPLICATION, WITHOUT LONG-TERM CURRENT USE OF INSULIN (HCC): ICD-10-CM

## 2025-04-22 RX ORDER — TIRZEPATIDE 7.5 MG/.5ML
INJECTION, SOLUTION SUBCUTANEOUS
Qty: 2 ML | Refills: 0 | Status: SHIPPED | OUTPATIENT
Start: 2025-04-22

## 2025-05-05 ENCOUNTER — OFFICE VISIT (OUTPATIENT)
Dept: FAMILY MEDICINE CLINIC | Facility: CLINIC | Age: 60
End: 2025-05-05
Payer: COMMERCIAL

## 2025-05-05 VITALS
WEIGHT: 196.2 LBS | HEART RATE: 71 BPM | SYSTOLIC BLOOD PRESSURE: 110 MMHG | TEMPERATURE: 97.2 F | HEIGHT: 64 IN | BODY MASS INDEX: 33.49 KG/M2 | OXYGEN SATURATION: 98 % | DIASTOLIC BLOOD PRESSURE: 70 MMHG

## 2025-05-05 DIAGNOSIS — E66.811 CLASS 1 OBESITY DUE TO EXCESS CALORIES WITH SERIOUS COMORBIDITY AND BODY MASS INDEX (BMI) OF 32.0 TO 32.9 IN ADULT: ICD-10-CM

## 2025-05-05 DIAGNOSIS — E66.09 CLASS 1 OBESITY DUE TO EXCESS CALORIES WITH SERIOUS COMORBIDITY AND BODY MASS INDEX (BMI) OF 32.0 TO 32.9 IN ADULT: ICD-10-CM

## 2025-05-05 DIAGNOSIS — G43.009 MIGRAINE WITHOUT AURA AND WITHOUT STATUS MIGRAINOSUS, NOT INTRACTABLE: ICD-10-CM

## 2025-05-05 DIAGNOSIS — E11.9 TYPE 2 DIABETES MELLITUS WITHOUT COMPLICATION, WITHOUT LONG-TERM CURRENT USE OF INSULIN (HCC): Primary | ICD-10-CM

## 2025-05-05 DIAGNOSIS — E78.5 HYPERLIPIDEMIA, UNSPECIFIED HYPERLIPIDEMIA TYPE: ICD-10-CM

## 2025-05-05 LAB — SL AMB POCT HEMOGLOBIN AIC: 5.5 (ref ?–6.5)

## 2025-05-05 PROCEDURE — 99214 OFFICE O/P EST MOD 30 MIN: CPT | Performed by: PHYSICIAN ASSISTANT

## 2025-05-05 PROCEDURE — 83036 HEMOGLOBIN GLYCOSYLATED A1C: CPT | Performed by: PHYSICIAN ASSISTANT

## 2025-05-05 RX ORDER — TIRZEPATIDE 10 MG/.5ML
10 INJECTION, SOLUTION SUBCUTANEOUS WEEKLY
Qty: 6 ML | Refills: 0 | Status: SHIPPED | OUTPATIENT
Start: 2025-05-05

## 2025-05-05 NOTE — PROGRESS NOTES
Diabetic Foot Exam    Patient's shoes and socks removed.    Right Foot/Ankle   Right Foot Inspection  Skin Exam: skin normal and skin intact. No dry skin, no warmth, no callus, no erythema, no maceration, no abnormal color, no pre-ulcer, no ulcer and no callus.     Toe Exam: right toe deformity.     Sensory   Vibration: intact  Monofilament testing: intact    Vascular  The right DP pulse is 2+.     Left Foot/Ankle  Left Foot Inspection  Skin Exam: skin normal and skin intact. No dry skin, no warmth, no erythema, no maceration, normal color, no pre-ulcer, no ulcer and no callus.     Toe Exam: left toe deformity.     Sensory   Vibration: intact  Monofilament testing: intact    Vascular  The left DP pulse is 2+.     Assign Risk Category  Deformity present  No loss of protective sensation  No weak pulses  Risk: 0  Name: Leah Lora      : 1965      MRN: [id number]  Encounter Provider: Gladys Camarena PA-C  Encounter Date: 2025   Encounter department: [facility]  :  Assessment & Plan  Type 2 diabetes mellitus without complication, without long-term current use of insulin ([facility])    Lab Results   Component Value Date    HGBA1C 6.0 2025     We will increase Mounjaro to 10 mg injected weekly.  To eat small frequent meals.  Continue exercise.  Orders:    Albumin / creatinine urine ratio; Future    POCT hemoglobin A1c    Tirzepatide (Mounjaro) 10 MG/0.5ML SOAJ; Inject 10 mg under the skin once a week    Comprehensive metabolic panel    CBC and differential    Lipid panel    Hemoglobin A1C    Hyperlipidemia, unspecified hyperlipidemia type  Continue statin therapy and low-fat diet  Orders:    Comprehensive metabolic panel    Lipid panel    Class 1 obesity due to excess calories with serious comorbidity and body mass index (BMI) of 32.0 to 32.9 in adult    Diet and exercise to promote weight loss       Migraine without aura and without status migrainosus, not intractable  Will continue as  "needed Imitrex              History of Present Illness   Patient presents in the office for follow-up chronic condition.  Patient has non-insulin diabetes mellitus type 2.  Current regimen is Mounjaro 7.5 mg injected weekly.  Is also on lisinopril 2.5.  Patient is tolerating Mounjaro well.  She has no side effects.  States she does have no appetite.  She only eats once a day.  Is very discouraged because she has not lost any weight.  Discussed diet.  Patient to eat small frequent meals.  For hyperlipidemia she is on Crestor 10 mg.  Migraines she uses as needed Imitrex.  These are stable.  Globin A1c done in the office today is 5.5.      Review of Systems   Constitutional:  Negative for activity change, appetite change and unexpected weight change.   HENT:  Negative for ear pain and sore throat.    Eyes:  Negative for visual disturbance.   Respiratory:  Negative for cough, shortness of breath and wheezing.    Cardiovascular:  Negative for chest pain and leg swelling.   Gastrointestinal:  Negative for abdominal pain, blood in stool, constipation, diarrhea, nausea and vomiting.   Genitourinary:  Negative for difficulty urinating.   Musculoskeletal:  Negative for arthralgias and myalgias.   Skin:  Negative for rash.   Neurological:  Negative for dizziness, syncope, light-headedness and headaches.   Psychiatric/Behavioral:  Negative for self-injury, sleep disturbance and suicidal ideas. The patient is nervous/anxious.        Objective   /70 (BP Location: Right arm, Patient Position: Sitting, Cuff Size: Extra-Large)   Pulse 71   Temp (!) 97.2 °F (36.2 °C) (Temporal)   Ht 5' 4\" (1.626 m)   Wt 89 kg (196 lb 3.2 oz)   SpO2 98%   Breastfeeding No   BMI 33.68 kg/m²      Physical Exam  Vitals and nursing note reviewed.   Constitutional:       General: She is not in acute distress.     Appearance: She is well-developed. She is obese. She is not ill-appearing or diaphoretic.   HENT:      Head: Normocephalic and " atraumatic.      Right Ear: Tympanic membrane, ear canal and external ear normal.      Left Ear: Tympanic membrane, ear canal and external ear normal.   Eyes:      Conjunctiva/sclera: Conjunctivae normal.      Pupils: Pupils are equal, round, and reactive to light.   Neck:      Thyroid: No thyromegaly.      Vascular: No carotid bruit.   Cardiovascular:      Rate and Rhythm: Normal rate and regular rhythm.      Pulses: no weak pulses.           Dorsalis pedis pulses are 2+ on the right side and 2+ on the left side.      Heart sounds: Normal heart sounds. No murmur heard.     No friction rub.   Pulmonary:      Effort: Pulmonary effort is normal. No respiratory distress.      Breath sounds: Normal breath sounds. No wheezing.   Abdominal:      General: Bowel sounds are normal. There is no distension.      Palpations: Abdomen is soft. There is no mass.      Tenderness: There is no abdominal tenderness.   Musculoskeletal:      Right lower leg: No edema.      Left lower leg: No edema.   Feet:      Right foot:      Skin integrity: No ulcer, skin breakdown, erythema, warmth, callus or dry skin.      Left foot:      Skin integrity: No ulcer, skin breakdown, erythema, warmth, callus or dry skin.   Lymphadenopathy:      Cervical: No cervical adenopathy.   Skin:     General: Skin is warm and dry.   Neurological:      General: No focal deficit present.      Mental Status: She is alert and oriented to person, place, and time.   Psychiatric:         Mood and Affect: Mood normal.         Behavior: Behavior normal.         Thought Content: Thought content normal.         Judgment: Judgment normal.

## 2025-05-05 NOTE — ASSESSMENT & PLAN NOTE
Continue statin therapy and low-fat diet  Orders:    Comprehensive metabolic panel    Lipid panel

## 2025-05-05 NOTE — ASSESSMENT & PLAN NOTE
Lab Results   Component Value Date    HGBA1C 6.0 02/03/2025     We will increase Mounjaro to 10 mg injected weekly.  To eat small frequent meals.  Continue exercise.  Orders:    Albumin / creatinine urine ratio; Future    POCT hemoglobin A1c    Tirzepatide (Mounjaro) 10 MG/0.5ML SOAJ; Inject 10 mg under the skin once a week    Comprehensive metabolic panel    CBC and differential    Lipid panel    Hemoglobin A1C

## 2025-07-11 DIAGNOSIS — E78.5 HYPERLIPIDEMIA, UNSPECIFIED HYPERLIPIDEMIA TYPE: ICD-10-CM

## 2025-07-13 RX ORDER — ROSUVASTATIN CALCIUM 10 MG/1
10 TABLET, COATED ORAL DAILY
Qty: 90 TABLET | Refills: 1 | Status: SHIPPED | OUTPATIENT
Start: 2025-07-13

## 2025-08-05 DIAGNOSIS — E11.9 TYPE 2 DIABETES MELLITUS WITHOUT COMPLICATION, WITHOUT LONG-TERM CURRENT USE OF INSULIN (HCC): ICD-10-CM

## 2025-08-06 RX ORDER — TIRZEPATIDE 10 MG/.5ML
INJECTION, SOLUTION SUBCUTANEOUS
Qty: 6 ML | Refills: 0 | Status: SHIPPED | OUTPATIENT
Start: 2025-08-06